# Patient Record
Sex: MALE | Race: WHITE | Employment: FULL TIME | URBAN - METROPOLITAN AREA
[De-identification: names, ages, dates, MRNs, and addresses within clinical notes are randomized per-mention and may not be internally consistent; named-entity substitution may affect disease eponyms.]

---

## 2024-01-18 DIAGNOSIS — L97.509 TOE ULCER (MULTI): ICD-10-CM

## 2024-01-18 DIAGNOSIS — I70.223 CRITICAL LIMB ISCHEMIA OF BOTH LOWER EXTREMITIES (MULTI): Primary | ICD-10-CM

## 2024-01-18 NOTE — PROGRESS NOTES
Referred by Dr. Verduzco ref. provider found provider found for No chief complaint on file.       History of Present Illness  Tim Stokes is a 53 y.o. year old male  with a history of type 2 diabetes mellitus, hypertension, hyperlipidemia, coronary artery disease status post CABGx3 3/22, history of congestive heart failure, morbid obesity, chronic venous stasis dermatitis, and long-standing and ongoing tobacco use, who has peripheral arterial occlusive disease and has non-healing wounds in both lower legs for 1.5 yrs.  He previously has undergone a left common iliac and  bilateral external iliac artery stenting as well as left SFA and popliteal angioplasties in 2017.  He underwent some toe amputations at that time in his left foot.  He now presents with tissue loss in both lower legs.  He has ulcerations involving his right medial lower leg, left medial foot and left anterolateral lower leg.  Noninvasive studies on 02/13/23, demonstrated right EDIN 0.69 and left EDIN 0.55. no toe pressure recorded.    He is now scheduled for Left femoral endarterectomy and Left fem-pop bypass surgery 1/26/24 at [facility] and they want to get other options before the surgery.  Aortogram with bilateral lower extremity runoff. 1/12/24 [facility]  IMPRESSION:    1.         65% right renal artery stenosis.  2.         Apparent left renal artery stenosis.  3.         70% mid infrarenal aortic stenosis.  4.         Patent right common iliac artery and bilateral external iliac artery stents.    5.         Occluded right internal iliac artery.  6.         Severe stenosis at the origin of the left internal iliac artery.  7.         Moderate left common femoral artery plaque disease.  8.         Occluded left superficial femoral artery.  9.         Reconstitution of a diseased left above-knee popliteal artery.  10.       Mild disease involving the left below-knee popliteal artery and 1 vessel peroneal runoff to the left foot.    11.       Left peroneal  collaterals distally reconstituted the posterior tibial and dorsalis pedis arteries in the left foot.    12.       Severe right common femoral artery plaque disease.  13.       Right superficial femoral artery occlusion.  14.       Right deep femoral collaterals reconstituted diseased right above-knee popliteal artery.    15.       Mild right below-knee popliteal artery disease and 1 vessel peroneal runoff to the right foot.    16.       Moderate right tibioperoneal trunk stenosis.  17.       Right peroneal artery reconstitutes the posterior tibial and dorsalis pedis arteries in the right foot.    Social History  Social History     Tobacco Use    Smoking status: Not on file    Smokeless tobacco: Not on file   Substance Use Topics    Alcohol use: Not on file    Drug use: Not on file       Family History   No family history on file.    Review of Systems  As per HPI, all other systems reviewed and negative.    Allergies:  Not on File     Outpatient Medications:  No current outpatient medications      Vitals:  There were no vitals filed for this visit.    Physical Exam:  Physical Exam      Reviewed Study(s):    Cardiac Studies  Echo: No results found for this or any previous visit.  Angiogram:     Vascular Studies   No image results found.       Assessment/Plan     3 y.o. year old male  with a history of type 2 diabetes mellitus, hypertension, hyperlipidemia, coronary artery disease status post CABGx3 3/22, history of congestive heart failure, morbid obesity, chronic venous stasis dermatitis, and long-standing and ongoing tobacco use, who has peripheral arterial occlusive disease and has non-healing wounds in both lower legs for 1.5 yrs.  He previously has undergone a left common iliac and  bilateral external iliac artery stenting as well as left SFA and popliteal angioplasties in 2017.  He underwent some toe amputations at that time in his left foot.  He now presents with tissue loss in both lower legs.  He has  ulcerations involving his right medial lower leg, left medial foot and left anterolateral lower leg.  Noninvasive studies on 02/13/23, demonstrated right EDIN 0.69 and left EDIN 0.55. no toe pressure recorded.    He is now scheduled for Left femoral endarterectomy and Left fem-pop bypass surgery 1/26/24 at [facility] and they want to get other options before the surgery.  Aortogram with bilateral lower extremity runoff. 1/12/24 [facility]    # Multilevel disease (as mentioned above, multiple intervention).  # Multiple vascular bed disease (artrial and venous).  # Extensive ulcers in  both legs.    - Patient was smoking and extensive smoking cessation counseling was given.  - Patient might need multiple intervention/surgery starting from Most proximal disease (aorta), followed by distal disease.  - Patient was given realistic expectation of really complex and prolonged course and we might still not able avoid some level of amputation.  - we will discuss with Surgeon and come up with plan and discuss with family.  - Plan discussed with Dr. Mely Lundberg MD MPH  PGY 8

## 2024-01-22 ENCOUNTER — OFFICE VISIT (OUTPATIENT)
Dept: CARDIOLOGY | Facility: CLINIC | Age: 54
End: 2024-01-22
Payer: COMMERCIAL

## 2024-01-22 VITALS
WEIGHT: 170 LBS | DIASTOLIC BLOOD PRESSURE: 90 MMHG | HEIGHT: 71 IN | BODY MASS INDEX: 23.8 KG/M2 | SYSTOLIC BLOOD PRESSURE: 126 MMHG | HEART RATE: 73 BPM

## 2024-01-22 DIAGNOSIS — I73.9 PAD (PERIPHERAL ARTERY DISEASE) (CMS-HCC): Primary | ICD-10-CM

## 2024-01-22 PROCEDURE — NOSHO NO SHOW VISIT: Performed by: INTERNAL MEDICINE

## 2024-01-22 ASSESSMENT — PAIN SCALES - GENERAL: PAINLEVEL: 2

## 2024-01-22 NOTE — PATIENT INSTRUCTIONS
52 YO M with DM2, HTN, CAD s/p CABG, PAD mutliple intervention presenting with bilateral foot and leg ulcer (mixed in nature) presenting to Dr. Boone for second opinion for leg intervention (multi level disease aortic, iliac, femoral and tibial).    # Multilevel PAD with CLTI  # Severe descending aorta stenosis  # Venous disease    - stop smoking  - continue taking aspirin and high intensity statin (atorvastatin 80 mg)  - plan to discuss the case as  multidisplinary approach.  - multiple staged procedure  - referral to Dr. Powell (podiatry) for aggressive wound care  - Plan was discussed extensively to patient and family by Dr. Boone  - will call patient within a week with final decisions

## 2024-01-23 ENCOUNTER — APPOINTMENT (OUTPATIENT)
Dept: VASCULAR MEDICINE | Facility: HOSPITAL | Age: 54
End: 2024-01-23

## 2024-01-23 ENCOUNTER — APPOINTMENT (OUTPATIENT)
Dept: CARDIOLOGY | Facility: CLINIC | Age: 54
End: 2024-01-23
Payer: COMMERCIAL

## 2024-01-23 ENCOUNTER — APPOINTMENT (OUTPATIENT)
Dept: CARDIOLOGY | Facility: HOSPITAL | Age: 54
End: 2024-01-23

## 2024-01-29 DIAGNOSIS — I73.9 PAD (PERIPHERAL ARTERY DISEASE) (CMS-HCC): Primary | ICD-10-CM

## 2024-01-30 ENCOUNTER — OFFICE VISIT (OUTPATIENT)
Dept: WOUND CARE | Facility: CLINIC | Age: 54
End: 2024-01-30
Payer: COMMERCIAL

## 2024-01-30 ENCOUNTER — APPOINTMENT (OUTPATIENT)
Dept: LAB | Facility: LAB | Age: 54
End: 2024-01-30
Payer: COMMERCIAL

## 2024-01-30 DIAGNOSIS — L97.522 NON-PRESSURE CHRONIC ULCER OF OTHER PART OF LEFT FOOT WITH FAT LAYER EXPOSED (MULTI): Primary | ICD-10-CM

## 2024-01-30 PROCEDURE — 87205 SMEAR GRAM STAIN: CPT

## 2024-01-30 PROCEDURE — 87070 CULTURE OTHR SPECIMN AEROBIC: CPT

## 2024-01-30 PROCEDURE — 99215 OFFICE O/P EST HI 40 MIN: CPT

## 2024-01-30 PROCEDURE — 99417 PROLNG OP E/M EACH 15 MIN: CPT

## 2024-01-30 PROCEDURE — 87075 CULTR BACTERIA EXCEPT BLOOD: CPT

## 2024-01-31 ENCOUNTER — HOSPITAL ENCOUNTER (INPATIENT)
Facility: HOSPITAL | Age: 54
LOS: 3 days | Discharge: HOME | DRG: 300 | End: 2024-02-04
Attending: EMERGENCY MEDICINE | Admitting: INTERNAL MEDICINE
Payer: COMMERCIAL

## 2024-01-31 ENCOUNTER — APPOINTMENT (OUTPATIENT)
Dept: RADIOLOGY | Facility: HOSPITAL | Age: 54
DRG: 300 | End: 2024-01-31
Payer: COMMERCIAL

## 2024-01-31 DIAGNOSIS — I73.9 PERIPHERAL ARTERIAL DISEASE (CMS-HCC): ICD-10-CM

## 2024-01-31 DIAGNOSIS — I87.2 CHRONIC VENOUS INSUFFICIENCY: ICD-10-CM

## 2024-01-31 DIAGNOSIS — L08.9 WOUND INFECTION: Primary | ICD-10-CM

## 2024-01-31 DIAGNOSIS — T14.8XXA WOUND INFECTION: Primary | ICD-10-CM

## 2024-01-31 LAB
ALBUMIN SERPL BCP-MCNC: 3.6 G/DL (ref 3.4–5)
ALP SERPL-CCNC: 77 U/L (ref 33–120)
ALT SERPL W P-5'-P-CCNC: 6 U/L (ref 10–52)
ANION GAP SERPL CALC-SCNC: 19 MMOL/L (ref 10–20)
AST SERPL W P-5'-P-CCNC: 10 U/L (ref 9–39)
BASOPHILS # BLD AUTO: 0.05 X10*3/UL (ref 0–0.1)
BASOPHILS # BLD AUTO: 0.06 X10*3/UL (ref 0–0.1)
BASOPHILS NFR BLD AUTO: 0.6 %
BASOPHILS NFR BLD AUTO: 0.7 %
BILIRUB SERPL-MCNC: 0.3 MG/DL (ref 0–1.2)
BUN SERPL-MCNC: 16 MG/DL (ref 6–23)
CALCIUM SERPL-MCNC: 9.4 MG/DL (ref 8.6–10.6)
CHLORIDE SERPL-SCNC: 104 MMOL/L (ref 98–107)
CO2 SERPL-SCNC: 17 MMOL/L (ref 21–32)
CREAT SERPL-MCNC: 0.66 MG/DL (ref 0.5–1.3)
EGFRCR SERPLBLD CKD-EPI 2021: >90 ML/MIN/1.73M*2
EOSINOPHIL # BLD AUTO: 0.2 X10*3/UL (ref 0–0.7)
EOSINOPHIL # BLD AUTO: 0.23 X10*3/UL (ref 0–0.7)
EOSINOPHIL NFR BLD AUTO: 2.4 %
EOSINOPHIL NFR BLD AUTO: 2.7 %
ERYTHROCYTE [DISTWIDTH] IN BLOOD BY AUTOMATED COUNT: 14.4 % (ref 11.5–14.5)
ERYTHROCYTE [DISTWIDTH] IN BLOOD BY AUTOMATED COUNT: 16.8 % (ref 11.5–14.5)
GLUCOSE BLD MANUAL STRIP-MCNC: 156 MG/DL (ref 74–99)
GLUCOSE SERPL-MCNC: 198 MG/DL (ref 74–99)
HCT VFR BLD AUTO: 40.7 % (ref 41–52)
HCT VFR BLD AUTO: 40.8 % (ref 41–52)
HGB BLD-MCNC: 13.7 G/DL (ref 13.5–17.5)
HGB BLD-MCNC: 14.2 G/DL (ref 13.5–17.5)
IMM GRANULOCYTES # BLD AUTO: 0.03 X10*3/UL (ref 0–0.7)
IMM GRANULOCYTES # BLD AUTO: 0.03 X10*3/UL (ref 0–0.7)
IMM GRANULOCYTES NFR BLD AUTO: 0.3 % (ref 0–0.9)
IMM GRANULOCYTES NFR BLD AUTO: 0.4 % (ref 0–0.9)
LYMPHOCYTES # BLD AUTO: 1.63 X10*3/UL (ref 1.2–4.8)
LYMPHOCYTES # BLD AUTO: 1.95 X10*3/UL (ref 1.2–4.8)
LYMPHOCYTES NFR BLD AUTO: 19.3 %
LYMPHOCYTES NFR BLD AUTO: 22.6 %
MCH RBC QN AUTO: 28.5 PG (ref 26–34)
MCH RBC QN AUTO: 29.2 PG (ref 26–34)
MCHC RBC AUTO-ENTMCNC: 33.6 G/DL (ref 32–36)
MCHC RBC AUTO-ENTMCNC: 34.9 G/DL (ref 32–36)
MCV RBC AUTO: 84 FL (ref 80–100)
MCV RBC AUTO: 85 FL (ref 80–100)
MONOCYTES # BLD AUTO: 0.75 X10*3/UL (ref 0.1–1)
MONOCYTES # BLD AUTO: 0.83 X10*3/UL (ref 0.1–1)
MONOCYTES NFR BLD AUTO: 8.9 %
MONOCYTES NFR BLD AUTO: 9.6 %
NEUTROPHILS # BLD AUTO: 5.52 X10*3/UL (ref 1.2–7.7)
NEUTROPHILS # BLD AUTO: 5.77 X10*3/UL (ref 1.2–7.7)
NEUTROPHILS NFR BLD AUTO: 64.1 %
NEUTROPHILS NFR BLD AUTO: 68.4 %
NRBC BLD-RTO: 0 /100 WBCS (ref 0–0)
NRBC BLD-RTO: 0 /100 WBCS (ref 0–0)
PLATELET # BLD AUTO: 213 X10*3/UL (ref 150–450)
PLATELET # BLD AUTO: 277 X10*3/UL (ref 150–450)
POTASSIUM SERPL-SCNC: 4.3 MMOL/L (ref 3.5–5.3)
PROT SERPL-MCNC: 7.2 G/DL (ref 6.4–8.2)
RBC # BLD AUTO: 4.8 X10*6/UL (ref 4.5–5.9)
RBC # BLD AUTO: 4.86 X10*6/UL (ref 4.5–5.9)
SODIUM SERPL-SCNC: 136 MMOL/L (ref 136–145)
WBC # BLD AUTO: 8.4 X10*3/UL (ref 4.4–11.3)
WBC # BLD AUTO: 8.6 X10*3/UL (ref 4.4–11.3)

## 2024-01-31 PROCEDURE — 85025 COMPLETE CBC W/AUTO DIFF WBC: CPT | Performed by: EMERGENCY MEDICINE

## 2024-01-31 PROCEDURE — 73590 X-RAY EXAM OF LOWER LEG: CPT | Mod: 50

## 2024-01-31 PROCEDURE — 96375 TX/PRO/DX INJ NEW DRUG ADDON: CPT

## 2024-01-31 PROCEDURE — 99285 EMERGENCY DEPT VISIT HI MDM: CPT | Performed by: EMERGENCY MEDICINE

## 2024-01-31 PROCEDURE — 96365 THER/PROPH/DIAG IV INF INIT: CPT

## 2024-01-31 PROCEDURE — 87040 BLOOD CULTURE FOR BACTERIA: CPT

## 2024-01-31 PROCEDURE — 2500000004 HC RX 250 GENERAL PHARMACY W/ HCPCS (ALT 636 FOR OP/ED)

## 2024-01-31 PROCEDURE — 73610 X-RAY EXAM OF ANKLE: CPT | Mod: LT

## 2024-01-31 PROCEDURE — 73610 X-RAY EXAM OF ANKLE: CPT | Mod: RT

## 2024-01-31 PROCEDURE — 80053 COMPREHEN METABOLIC PANEL: CPT | Performed by: EMERGENCY MEDICINE

## 2024-01-31 PROCEDURE — 73590 X-RAY EXAM OF LOWER LEG: CPT | Mod: BILATERAL PROCEDURE | Performed by: STUDENT IN AN ORGANIZED HEALTH CARE EDUCATION/TRAINING PROGRAM

## 2024-01-31 PROCEDURE — 85025 COMPLETE CBC W/AUTO DIFF WBC: CPT

## 2024-01-31 PROCEDURE — 36415 COLL VENOUS BLD VENIPUNCTURE: CPT | Performed by: EMERGENCY MEDICINE

## 2024-01-31 PROCEDURE — 36415 COLL VENOUS BLD VENIPUNCTURE: CPT

## 2024-01-31 PROCEDURE — 73610 X-RAY EXAM OF ANKLE: CPT | Mod: RIGHT SIDE | Performed by: STUDENT IN AN ORGANIZED HEALTH CARE EDUCATION/TRAINING PROGRAM

## 2024-01-31 PROCEDURE — 99223 1ST HOSP IP/OBS HIGH 75: CPT | Performed by: STUDENT IN AN ORGANIZED HEALTH CARE EDUCATION/TRAINING PROGRAM

## 2024-01-31 PROCEDURE — 82947 ASSAY GLUCOSE BLOOD QUANT: CPT

## 2024-01-31 RX ORDER — VANCOMYCIN HYDROCHLORIDE 1 G/200ML
2 INJECTION, SOLUTION INTRAVENOUS ONCE
Status: COMPLETED | OUTPATIENT
Start: 2024-01-31 | End: 2024-02-01

## 2024-01-31 RX ADMIN — VANCOMYCIN HYDROCHLORIDE 2 G: 1 INJECTION, SOLUTION INTRAVENOUS at 22:24

## 2024-01-31 RX ADMIN — PIPERACILLIN SODIUM AND TAZOBACTAM SODIUM 4.5 G: 4; .5 INJECTION, SOLUTION INTRAVENOUS at 21:32

## 2024-01-31 ASSESSMENT — PAIN - FUNCTIONAL ASSESSMENT: PAIN_FUNCTIONAL_ASSESSMENT: 0-10

## 2024-01-31 ASSESSMENT — COLUMBIA-SUICIDE SEVERITY RATING SCALE - C-SSRS
6. HAVE YOU EVER DONE ANYTHING, STARTED TO DO ANYTHING, OR PREPARED TO DO ANYTHING TO END YOUR LIFE?: NO
1. IN THE PAST MONTH, HAVE YOU WISHED YOU WERE DEAD OR WISHED YOU COULD GO TO SLEEP AND NOT WAKE UP?: NO
2. HAVE YOU ACTUALLY HAD ANY THOUGHTS OF KILLING YOURSELF?: NO

## 2024-01-31 ASSESSMENT — LIFESTYLE VARIABLES
HAVE PEOPLE ANNOYED YOU BY CRITICIZING YOUR DRINKING: NO
EVER HAD A DRINK FIRST THING IN THE MORNING TO STEADY YOUR NERVES TO GET RID OF A HANGOVER: NO
EVER FELT BAD OR GUILTY ABOUT YOUR DRINKING: NO
HAVE YOU EVER FELT YOU SHOULD CUT DOWN ON YOUR DRINKING: NO

## 2024-01-31 ASSESSMENT — PAIN SCALES - GENERAL: PAINLEVEL_OUTOF10: 5 - MODERATE PAIN

## 2024-01-31 ASSESSMENT — PAIN DESCRIPTION - LOCATION: LOCATION: LEG

## 2024-02-01 PROBLEM — T14.8XXA WOUND INFECTION: Status: ACTIVE | Noted: 2024-02-01

## 2024-02-01 PROBLEM — L08.9 WOUND INFECTION: Status: ACTIVE | Noted: 2024-02-01

## 2024-02-01 LAB
ALBUMIN SERPL BCP-MCNC: 3.3 G/DL (ref 3.4–5)
ANION GAP SERPL CALC-SCNC: 13 MMOL/L (ref 10–20)
BACTERIA SPEC CULT: ABNORMAL
BASOPHILS # BLD AUTO: 0.05 X10*3/UL (ref 0–0.1)
BASOPHILS NFR BLD AUTO: 0.7 %
BUN SERPL-MCNC: 14 MG/DL (ref 6–23)
CALCIUM SERPL-MCNC: 9.1 MG/DL (ref 8.6–10.6)
CHLORIDE SERPL-SCNC: 106 MMOL/L (ref 98–107)
CO2 SERPL-SCNC: 22 MMOL/L (ref 21–32)
CREAT SERPL-MCNC: 0.68 MG/DL (ref 0.5–1.3)
EGFRCR SERPLBLD CKD-EPI 2021: >90 ML/MIN/1.73M*2
EOSINOPHIL # BLD AUTO: 0.25 X10*3/UL (ref 0–0.7)
EOSINOPHIL NFR BLD AUTO: 3.4 %
ERYTHROCYTE [DISTWIDTH] IN BLOOD BY AUTOMATED COUNT: 14.2 % (ref 11.5–14.5)
GLUCOSE BLD MANUAL STRIP-MCNC: 156 MG/DL (ref 74–99)
GLUCOSE BLD MANUAL STRIP-MCNC: 175 MG/DL (ref 74–99)
GLUCOSE BLD MANUAL STRIP-MCNC: 197 MG/DL (ref 74–99)
GLUCOSE BLD MANUAL STRIP-MCNC: 204 MG/DL (ref 74–99)
GLUCOSE SERPL-MCNC: 158 MG/DL (ref 74–99)
GRAM STN SPEC: ABNORMAL
GRAM STN SPEC: ABNORMAL
HCT VFR BLD AUTO: 40 % (ref 41–52)
HGB BLD-MCNC: 13.3 G/DL (ref 13.5–17.5)
IMM GRANULOCYTES # BLD AUTO: 0.03 X10*3/UL (ref 0–0.7)
IMM GRANULOCYTES NFR BLD AUTO: 0.4 % (ref 0–0.9)
LYMPHOCYTES # BLD AUTO: 0.59 X10*3/UL (ref 1.2–4.8)
LYMPHOCYTES NFR BLD AUTO: 8.1 %
MAGNESIUM SERPL-MCNC: 1.87 MG/DL (ref 1.6–2.4)
MCH RBC QN AUTO: 27.9 PG (ref 26–34)
MCHC RBC AUTO-ENTMCNC: 33.3 G/DL (ref 32–36)
MCV RBC AUTO: 84 FL (ref 80–100)
MONOCYTES # BLD AUTO: 0.63 X10*3/UL (ref 0.1–1)
MONOCYTES NFR BLD AUTO: 8.6 %
NEUTROPHILS # BLD AUTO: 5.76 X10*3/UL (ref 1.2–7.7)
NEUTROPHILS NFR BLD AUTO: 78.8 %
NRBC BLD-RTO: 0 /100 WBCS (ref 0–0)
PHOSPHATE SERPL-MCNC: 3.4 MG/DL (ref 2.5–4.9)
PLATELET # BLD AUTO: 271 X10*3/UL (ref 150–450)
POTASSIUM SERPL-SCNC: 4.1 MMOL/L (ref 3.5–5.3)
RBC # BLD AUTO: 4.76 X10*6/UL (ref 4.5–5.9)
SODIUM SERPL-SCNC: 137 MMOL/L (ref 136–145)
WBC # BLD AUTO: 7.3 X10*3/UL (ref 4.4–11.3)

## 2024-02-01 PROCEDURE — 82947 ASSAY GLUCOSE BLOOD QUANT: CPT

## 2024-02-01 PROCEDURE — 85025 COMPLETE CBC W/AUTO DIFF WBC: CPT | Performed by: STUDENT IN AN ORGANIZED HEALTH CARE EDUCATION/TRAINING PROGRAM

## 2024-02-01 PROCEDURE — 36415 COLL VENOUS BLD VENIPUNCTURE: CPT | Performed by: INTERNAL MEDICINE

## 2024-02-01 PROCEDURE — 80069 RENAL FUNCTION PANEL: CPT | Performed by: STUDENT IN AN ORGANIZED HEALTH CARE EDUCATION/TRAINING PROGRAM

## 2024-02-01 PROCEDURE — 1100000001 HC PRIVATE ROOM DAILY

## 2024-02-01 PROCEDURE — 2500000004 HC RX 250 GENERAL PHARMACY W/ HCPCS (ALT 636 FOR OP/ED): Performed by: STUDENT IN AN ORGANIZED HEALTH CARE EDUCATION/TRAINING PROGRAM

## 2024-02-01 PROCEDURE — 83735 ASSAY OF MAGNESIUM: CPT | Performed by: INTERNAL MEDICINE

## 2024-02-01 PROCEDURE — 2500000002 HC RX 250 W HCPCS SELF ADMINISTERED DRUGS (ALT 637 FOR MEDICARE OP, ALT 636 FOR OP/ED): Performed by: STUDENT IN AN ORGANIZED HEALTH CARE EDUCATION/TRAINING PROGRAM

## 2024-02-01 PROCEDURE — 99233 SBSQ HOSP IP/OBS HIGH 50: CPT | Performed by: STUDENT IN AN ORGANIZED HEALTH CARE EDUCATION/TRAINING PROGRAM

## 2024-02-01 PROCEDURE — 2500000001 HC RX 250 WO HCPCS SELF ADMINISTERED DRUGS (ALT 637 FOR MEDICARE OP): Performed by: STUDENT IN AN ORGANIZED HEALTH CARE EDUCATION/TRAINING PROGRAM

## 2024-02-01 RX ORDER — ASPIRIN 81 MG/1
81 TABLET ORAL DAILY
Status: DISCONTINUED | OUTPATIENT
Start: 2024-02-01 | End: 2024-02-04 | Stop reason: HOSPADM

## 2024-02-01 RX ORDER — METFORMIN HYDROCHLORIDE 500 MG/1
500 TABLET ORAL
COMMUNITY
End: 2024-05-02 | Stop reason: ALTCHOICE

## 2024-02-01 RX ORDER — ACETAMINOPHEN 650 MG/1
650 SUPPOSITORY RECTAL EVERY 4 HOURS PRN
Status: DISCONTINUED | OUTPATIENT
Start: 2024-02-01 | End: 2024-02-04 | Stop reason: HOSPADM

## 2024-02-01 RX ORDER — ACETAMINOPHEN 160 MG/5ML
650 SOLUTION ORAL EVERY 4 HOURS PRN
Status: DISCONTINUED | OUTPATIENT
Start: 2024-02-01 | End: 2024-02-04 | Stop reason: HOSPADM

## 2024-02-01 RX ORDER — GLIPIZIDE 5 MG/1
5 TABLET ORAL 2 TIMES DAILY
COMMUNITY

## 2024-02-01 RX ORDER — DEXTROSE MONOHYDRATE 100 MG/ML
0.3 INJECTION, SOLUTION INTRAVENOUS ONCE AS NEEDED
Status: DISCONTINUED | OUTPATIENT
Start: 2024-02-01 | End: 2024-02-04 | Stop reason: HOSPADM

## 2024-02-01 RX ORDER — ASPIRIN 81 MG/1
81 TABLET ORAL DAILY
COMMUNITY

## 2024-02-01 RX ORDER — ATORVASTATIN CALCIUM 40 MG/1
40 TABLET, FILM COATED ORAL DAILY
COMMUNITY

## 2024-02-01 RX ORDER — INSULIN LISPRO 100 [IU]/ML
0-5 INJECTION, SOLUTION INTRAVENOUS; SUBCUTANEOUS
Status: DISCONTINUED | OUTPATIENT
Start: 2024-02-01 | End: 2024-02-04 | Stop reason: HOSPADM

## 2024-02-01 RX ORDER — POLYETHYLENE GLYCOL 3350 17 G/17G
17 POWDER, FOR SOLUTION ORAL DAILY
Status: DISCONTINUED | OUTPATIENT
Start: 2024-02-01 | End: 2024-02-03

## 2024-02-01 RX ORDER — OXYCODONE HYDROCHLORIDE 5 MG/1
5 TABLET ORAL EVERY 6 HOURS PRN
Status: DISCONTINUED | OUTPATIENT
Start: 2024-02-01 | End: 2024-02-04 | Stop reason: HOSPADM

## 2024-02-01 RX ORDER — ACETAMINOPHEN 325 MG/1
650 TABLET ORAL EVERY 4 HOURS PRN
Status: DISCONTINUED | OUTPATIENT
Start: 2024-02-01 | End: 2024-02-04 | Stop reason: HOSPADM

## 2024-02-01 RX ORDER — IBUPROFEN 200 MG
600 TABLET ORAL EVERY 6 HOURS PRN
COMMUNITY
End: 2024-02-04 | Stop reason: HOSPADM

## 2024-02-01 RX ORDER — TALC
6 POWDER (GRAM) TOPICAL NIGHTLY
Status: DISCONTINUED | OUTPATIENT
Start: 2024-02-01 | End: 2024-02-04 | Stop reason: HOSPADM

## 2024-02-01 RX ORDER — LOSARTAN POTASSIUM 50 MG/1
50 TABLET ORAL DAILY
COMMUNITY

## 2024-02-01 RX ORDER — LOSARTAN POTASSIUM 50 MG/1
50 TABLET ORAL DAILY
Status: DISCONTINUED | OUTPATIENT
Start: 2024-02-01 | End: 2024-02-04 | Stop reason: HOSPADM

## 2024-02-01 RX ORDER — ATORVASTATIN CALCIUM 40 MG/1
40 TABLET, FILM COATED ORAL DAILY
Status: DISCONTINUED | OUTPATIENT
Start: 2024-02-01 | End: 2024-02-04 | Stop reason: HOSPADM

## 2024-02-01 RX ORDER — DEXTROSE 50 % IN WATER (D50W) INTRAVENOUS SYRINGE
25
Status: DISCONTINUED | OUTPATIENT
Start: 2024-02-01 | End: 2024-02-04 | Stop reason: HOSPADM

## 2024-02-01 RX ORDER — ENOXAPARIN SODIUM 100 MG/ML
40 INJECTION SUBCUTANEOUS EVERY 12 HOURS SCHEDULED
Status: DISCONTINUED | OUTPATIENT
Start: 2024-02-01 | End: 2024-02-04 | Stop reason: HOSPADM

## 2024-02-01 RX ADMIN — ATORVASTATIN CALCIUM 40 MG: 40 TABLET, FILM COATED ORAL at 08:44

## 2024-02-01 RX ADMIN — DEXTROSE MONOHYDRATE 1500 MG: 50 INJECTION, SOLUTION INTRAVENOUS at 10:27

## 2024-02-01 RX ADMIN — ACETAMINOPHEN 650 MG: 325 TABLET ORAL at 21:56

## 2024-02-01 RX ADMIN — EMPAGLIFLOZIN 25 MG: 25 TABLET, FILM COATED ORAL at 08:45

## 2024-02-01 RX ADMIN — MELATONIN 6 MG: 3 TAB ORAL at 21:50

## 2024-02-01 RX ADMIN — OXYCODONE HYDROCHLORIDE 5 MG: 5 TABLET ORAL at 17:19

## 2024-02-01 RX ADMIN — PIPERACILLIN SODIUM AND TAZOBACTAM SODIUM 3.38 G: 3; .375 INJECTION, SOLUTION INTRAVENOUS at 03:17

## 2024-02-01 RX ADMIN — PIPERACILLIN SODIUM AND TAZOBACTAM SODIUM 3.38 G: 3; .375 INJECTION, SOLUTION INTRAVENOUS at 12:22

## 2024-02-01 RX ADMIN — PIPERACILLIN SODIUM AND TAZOBACTAM SODIUM 3.38 G: 3; .375 INJECTION, SOLUTION INTRAVENOUS at 18:09

## 2024-02-01 RX ADMIN — LOSARTAN POTASSIUM 50 MG: 50 TABLET, FILM COATED ORAL at 08:45

## 2024-02-01 RX ADMIN — ENOXAPARIN SODIUM 40 MG: 100 INJECTION SUBCUTANEOUS at 21:50

## 2024-02-01 RX ADMIN — ASPIRIN 81 MG: 81 TABLET, COATED ORAL at 08:45

## 2024-02-01 RX ADMIN — PIPERACILLIN SODIUM AND TAZOBACTAM SODIUM 3.38 G: 3; .375 INJECTION, SOLUTION INTRAVENOUS at 08:47

## 2024-02-01 RX ADMIN — INSULIN LISPRO 2 UNITS: 100 INJECTION, SOLUTION INTRAVENOUS; SUBCUTANEOUS at 11:06

## 2024-02-01 RX ADMIN — ACETAMINOPHEN 650 MG: 325 TABLET ORAL at 15:26

## 2024-02-01 RX ADMIN — DEXTROSE MONOHYDRATE 1500 MG: 50 INJECTION, SOLUTION INTRAVENOUS at 21:51

## 2024-02-01 RX ADMIN — MELATONIN 6 MG: 3 TAB ORAL at 00:06

## 2024-02-01 SDOH — SOCIAL STABILITY: SOCIAL INSECURITY: DOES ANYONE TRY TO KEEP YOU FROM HAVING/CONTACTING OTHER FRIENDS OR DOING THINGS OUTSIDE YOUR HOME?: NO

## 2024-02-01 SDOH — SOCIAL STABILITY: SOCIAL INSECURITY: HAS ANYONE EVER THREATENED TO HURT YOUR FAMILY OR YOUR PETS?: NO

## 2024-02-01 SDOH — SOCIAL STABILITY: SOCIAL INSECURITY: ARE THERE ANY APPARENT SIGNS OF INJURIES/BEHAVIORS THAT COULD BE RELATED TO ABUSE/NEGLECT?: NO

## 2024-02-01 SDOH — SOCIAL STABILITY: SOCIAL INSECURITY: ARE YOU OR HAVE YOU BEEN THREATENED OR ABUSED PHYSICALLY, EMOTIONALLY, OR SEXUALLY BY ANYONE?: NO

## 2024-02-01 SDOH — SOCIAL STABILITY: SOCIAL INSECURITY: DO YOU FEEL ANYONE HAS EXPLOITED OR TAKEN ADVANTAGE OF YOU FINANCIALLY OR OF YOUR PERSONAL PROPERTY?: NO

## 2024-02-01 SDOH — SOCIAL STABILITY: SOCIAL INSECURITY: HAVE YOU HAD THOUGHTS OF HARMING ANYONE ELSE?: NO

## 2024-02-01 SDOH — SOCIAL STABILITY: SOCIAL INSECURITY: ABUSE: ADULT

## 2024-02-01 SDOH — SOCIAL STABILITY: SOCIAL INSECURITY: DO YOU FEEL UNSAFE GOING BACK TO THE PLACE WHERE YOU ARE LIVING?: NO

## 2024-02-01 ASSESSMENT — LIFESTYLE VARIABLES
HOW OFTEN DO YOU HAVE A DRINK CONTAINING ALCOHOL: NEVER
AUDIT-C TOTAL SCORE: 0
HOW OFTEN DO YOU HAVE 6 OR MORE DRINKS ON ONE OCCASION: NEVER
HOW MANY STANDARD DRINKS CONTAINING ALCOHOL DO YOU HAVE ON A TYPICAL DAY: PATIENT DOES NOT DRINK
SUBSTANCE_ABUSE_PAST_12_MONTHS: NO
AUDIT-C TOTAL SCORE: 0
PRESCIPTION_ABUSE_PAST_12_MONTHS: NO
SKIP TO QUESTIONS 9-10: 1

## 2024-02-01 ASSESSMENT — ENCOUNTER SYMPTOMS
CARDIOVASCULAR NEGATIVE: 1
RESPIRATORY NEGATIVE: 1
CHILLS: 0
COLOR CHANGE: 0
FEVER: 0
WOUND: 1
GASTROINTESTINAL NEGATIVE: 1
UNEXPECTED WEIGHT CHANGE: 0

## 2024-02-01 ASSESSMENT — ACTIVITIES OF DAILY LIVING (ADL)
ASSISTIVE_DEVICE: WALKER
FEEDING YOURSELF: INDEPENDENT
BATHING: INDEPENDENT
LACK_OF_TRANSPORTATION: NO
TOILETING: INDEPENDENT
HEARING - RIGHT EAR: FUNCTIONAL
HEARING - LEFT EAR: FUNCTIONAL
WALKS IN HOME: INDEPENDENT
ADEQUATE_TO_COMPLETE_ADL: YES
JUDGMENT_ADEQUATE_SAFELY_COMPLETE_DAILY_ACTIVITIES: YES
PATIENT'S MEMORY ADEQUATE TO SAFELY COMPLETE DAILY ACTIVITIES?: YES
DRESSING YOURSELF: INDEPENDENT
GROOMING: INDEPENDENT

## 2024-02-01 ASSESSMENT — PATIENT HEALTH QUESTIONNAIRE - PHQ9
1. LITTLE INTEREST OR PLEASURE IN DOING THINGS: NOT AT ALL
2. FEELING DOWN, DEPRESSED OR HOPELESS: NOT AT ALL
SUM OF ALL RESPONSES TO PHQ9 QUESTIONS 1 & 2: 0

## 2024-02-01 ASSESSMENT — COGNITIVE AND FUNCTIONAL STATUS - GENERAL
DAILY ACTIVITIY SCORE: 18
HELP NEEDED FOR BATHING: A LITTLE
WALKING IN HOSPITAL ROOM: A LITTLE
MOVING TO AND FROM BED TO CHAIR: A LITTLE
TURNING FROM BACK TO SIDE WHILE IN FLAT BAD: A LITTLE
CLIMB 3 TO 5 STEPS WITH RAILING: A LITTLE
TOILETING: A LITTLE
STANDING UP FROM CHAIR USING ARMS: A LITTLE
PERSONAL GROOMING: A LITTLE
DRESSING REGULAR LOWER BODY CLOTHING: A LITTLE
DRESSING REGULAR UPPER BODY CLOTHING: A LITTLE
EATING MEALS: A LITTLE
MOVING FROM LYING ON BACK TO SITTING ON SIDE OF FLAT BED WITH BEDRAILS: A LITTLE
PATIENT BASELINE BEDBOUND: NO
MOBILITY SCORE: 18

## 2024-02-01 ASSESSMENT — PAIN SCALES - GENERAL
PAINLEVEL_OUTOF10: 2
PAINLEVEL_OUTOF10: 5 - MODERATE PAIN
PAINLEVEL_OUTOF10: 8

## 2024-02-01 ASSESSMENT — PAIN - FUNCTIONAL ASSESSMENT
PAIN_FUNCTIONAL_ASSESSMENT: 0-10

## 2024-02-01 NOTE — CARE PLAN
The patient's goals for the shift include pt will remain safe -met     The clinical goals for the shift include pt will rate pain 4/10 or less -progressing

## 2024-02-01 NOTE — H&P (VIEW-ONLY)
History Of Present Illness  Tim Stokes is a 53 y.o. male with T2DM, HTN, HDL, CAD status post CABG X3, CHF, PAD, chronic venous stasis dermatitis s/p left common iliac, bilateral external iliac artery stenting, left SFA and popliteal angioplasties, left toe amputation presents with concern of poor circulation in bilateral lower extremities.     Pt has chronic venous stasis wounds located on b/l lower extremities for the past 1.5 years. He notes that for the particular last couple months, his wounds have had worsening odor, pustular drainage, and pain with poor healing.      Per home care notes, appears that patient has reported left lower leg, ankle, and foot wounds all declining in nature with additional new right lower leg wound.  Left medial ankle and left dorsal foot wounds started in March 2022. Left anterior leg started July 2023; right medial lower leg started December 2023. Mid chest wound emerged after surgery around 1 year ago.     Of note, patient was seen on 12/1/2023 to expedite vascular surgery. Vascular surgery evaluated 1/12, with inability to stent or apply angioplasty with possible thoughts of bypass surgery. Both Dr. Baez at [facility] and  vascular surgery did not recommend limb flow.     In the ED, VS T 36.4, RR 16, HR 73, /84, 98% on room air  Labs: /4.3/104/17/16/0.66, mg [ ]   Interventions: Blood cultures ordered, IV Vanc/Zosyn      Past Medical History  Surgical History  T2DM, HTN, HDL, CAD status post CABG X3, CHF, PAD, chronic venous stasis dermatitis s/p left common iliac, bilateral external iliac artery stenting, left SFA and popliteal angioplasties, left toe amputation      Social History  current smoker (not interested in smoking cessation)    Family History  No family history on file.     Allergies  Patient has no known allergies.    Review of Systems   Constitutional:  Negative for chills, fever and unexpected weight change.   HENT: Negative.     Respiratory: Negative.      Cardiovascular: Negative.  Negative for leg swelling.   Gastrointestinal: Negative.    Skin:  Positive for wound. Negative for color change and pallor.     Physical Exam  Constitutional:       Appearance: He is obese.   HENT:      Head: Normocephalic and atraumatic.      Mouth/Throat:      Mouth: Mucous membranes are moist.   Eyes:      Extraocular Movements: Extraocular movements intact.      Pupils: Pupils are equal, round, and reactive to light.   Cardiovascular:      Pulses: Normal pulses.      Heart sounds: No murmur heard.     Comments: 1+ PT pulses bilaterally  Pulmonary:      Effort: Pulmonary effort is normal.      Breath sounds: Normal breath sounds.   Abdominal:      General: Bowel sounds are normal.      Palpations: Abdomen is soft.   Musculoskeletal:      Comments: R leg appears bigger chronically since procedure; but not edematous.    Skin:     Comments: deep, pustular, erythematous skin around bilateral lower extremities   Neurological:      Mental Status: He is alert.      Comments: Full range of motion, normal intact sensation in BUE and BLE       Last Recorded Vitals  Blood pressure 125/75, pulse 83, temperature 36.7 °C (98.1 °F), temperature source Oral, resp. rate 18, weight 133 kg (293 lb), SpO2 97 %.    Relevant Results  Scheduled medications  aspirin, 81 mg, oral, Daily  atorvastatin, 40 mg, oral, Daily  empagliflozin, 25 mg, oral, Daily  enoxaparin, 40 mg, subcutaneous, q12h GAGE  insulin lispro, 0-5 Units, subcutaneous, TID with meals  losartan, 50 mg, oral, Daily  melatonin, 6 mg, oral, Nightly  piperacillin-tazobactam, 3.375 g, intravenous, q6h  polyethylene glycol, 17 g, oral, Daily  vancomycin, 1,500 mg, intravenous, q12h    PRN medications  PRN medications: acetaminophen **OR** acetaminophen **OR** acetaminophen, dextrose 10 % in water (D10W), dextrose, glucagon, oxyCODONE    XR tibia fibula bilateral 2 views: Result Date: 2/1/2024    There is generalized soft tissue edema of the right  left leg the right and left leg, as well as ulcerations of the distal right left leg and dorsal left midfoot and forefoot. Findings are related in part to venous stasis edema given the presence of phleboliths bilaterally. No evidence of osteomyelitis of the proximal tibia or fibula on the right or left. Aside from the ulcerations, no evidence of soft tissue gas to suggest necrotizing infection.    XR ankle left 3+ views: Result Date: 2/1/2024    LEFT ANKLE: 1. Soft tissue ulcerations of the anterolateral distal leg and dorsal midfoot/forefoot without radiographic evidence of acute osteomyelitis. 2. Chronic periosteal reaction of the distal tibia and fibula likely related to venous stasis given extensive phleboliths. 3. Status post 4th-5th transmetatarsal amputations and 3rd toe amputation at the MTP joint. 4. Multiple additional potential etiologies of ankle/foot pain as described in detail above, depending on location and clinical context. 5. No acute fracture or malalignment.       RIGHT ANKLE: 1. Large soft tissue ulceration of the medial distal right leg without radiographic evidence of acute osteomyelitis. 2. Diffuse chronic periosteal new bone formation of the distal tibia and fibula related to venous stasis edema given phleboliths. 3. Advanced Charcot arthropathy of the right midfoot with collapse of the talonavicular and calcaneocuboid joints, and chronic fragmentation, ankylosis, and prominent plantar osseous protuberance arising from the midfoot that can predispose to pressure-related ulceration and osteomyelitis with continued weight-bearing. 4. Multiple potential etiologies of ankle/foot pain as described in detail above, depending on location and clinical context.       Aortogram with bilateral lower extremity runoff ([facility] 1/12)  IMPRESSION:    1.         65% right renal artery stenosis.  2.         Apparent left renal artery stenosis.  3.         70% mid infrarenal aortic stenosis.  4.         Patent  right common iliac artery and bilateral external iliac artery stents.    5.         Occluded right internal iliac artery.  6.         Severe stenosis at the origin of the left internal iliac artery.  7.         Moderate left common femoral artery plaque disease.  8.         Occluded left superficial femoral artery.  9.         Reconstitution of a diseased left above-knee popliteal artery.  10.       Mild disease involving the left below-knee popliteal artery and 1 vessel peroneal runoff to the left foot.    11.       Left peroneal collaterals distally reconstituted the posterior tibial and dorsalis pedis arteries in the left foot.    12.       Severe right common femoral artery plaque disease.  13.       Right superficial femoral artery occlusion.  14.       Right deep femoral collaterals reconstituted diseased right above-knee popliteal artery.    15.       Mild right below-knee popliteal artery disease and 1 vessel peroneal runoff to the right foot.    16.       Moderate right tibioperoneal trunk stenosis.  17.       Right peroneal artery reconstitutes the posterior tibial and dorsalis pedis arteries in the right foot.    Assessment/Plan   53 year old male with a history of type 2 diabetes mellitus, hypertension, hyperlipidemia, coronary artery disease status post CABGx3 3/22, history of congestive heart failure, morbid obesity, chronic venous stasis dermatitis, and long-standing and ongoing tobacco use, who has peripheral arterial occlusive disease and has non-healing wounds in both lower legs for 1.5 yrs.  He previously has undergone a left common iliac and  bilateral external iliac artery stenting as well as left SFA and popliteal angioplasties in 2017, in addition to some toe amputations at that time in his left foot.  He now presents with tissue loss in both lower legs with worsening ulcerations involving his right medial lower leg, left medial foot and left anterolateral lower leg.  He is admitted for  concern for infection given extensive malodorous drainage; as well as long term planning with podiatry and vascular surgery.     #Bilateral lower extremity wounds, acute on chronic   [ ] wound culture 1/30 podiatry clinic: 2+ few polymorphonuclear leukocytes, moderate mixed gram positive and gram negative bacteria  [ ] blood culture x2 1/31  [ ] vascular surgery consult in AM (PT 1+ present bilaterally on exam)  [ ] podiatry consult for skin graft (patient previously refused)  -Continue vanc/zosyn (1/31-   [ ] Xrays ordered for evaluation of osteomyelitis/gas- if +, will add clindamycin for coverage  -provide resources to stop smoking  [ ] nutrition consult to help with wound healing    #T2DM  -holding metformin, glipizide, ozempic  -mild SSI     #HDL  #CAD status post CABG X3  -continue atorvastatin    #HTN  #CHF  -continue losartan   -continue jardiance    #PAD  #chronic venous stasis dermatitis s/p left common iliac, bilateral external iliac artery stenting, left SFA and popliteal angioplasties  #left toe amputation   -continue aspirin 81mg BID    F: None  E: Replete PRN  N: Carb controlled diet  GI ppx: None  DVT ppx: Lovenox BID (dosing for weight)    Code: FULL  NOK: Laura (sister in law)     To be fully staffed with attending in AM.     Ravi Corey MD

## 2024-02-01 NOTE — PROGRESS NOTES
"Vancomycin Dosing by Pharmacy- INITIAL    Tim Stokes is a 53 y.o. year old male who Pharmacy has been consulted for vancomycin dosing for cellulitis, skin and soft tissue. Based on the patient's indication and renal status this patient will be dosed based on a goal AUC of 400-600.     Renal function is currently stable.    Visit Vitals  /75   Pulse 83   Temp 36.7 °C (98.1 °F) (Oral)   Resp 18        Lab Results   Component Value Date    CREATININE 0.66 01/31/2024        Patient weight is No results found for: \"PTWEIGHT\"    No results found for: \"CULTURE\"     No intake/output data recorded.  [unfilled]    No results found for: \"PATIENTTEMP\"       Assessment/Plan     Patient has already been given a loading dose of 2000 mg.  Will initiate vancomycin maintenance,  1500 mg every 12 hours.    This dosing regimen is predicted by [employer] to result in the following pharmacokinetic parameters:    Loading dose: N/A  Regimen: 1500 mg IV every 12 hours.  Start time: 10:24 on 02/01/2024  Exposure target: AUC24 (range)400-600 mg/L.hr   AUC24,ss: 459 mg/L.hr  Probability of AUC24 > 400: 60 %  Ctrough,ss: 10.6 mg/L  Probability of Ctrough,ss > 20: 24 %  Probability of nephrotoxicity (Lodise NAHUM 2009): 6 %    Follow-up level will be ordered on 2/3 at AM labs, unless clinically indicated sooner.  Will continue to monitor renal function daily while on vancomycin and order serum creatinine at least every 48 hours if not already ordered.  Follow for continued vancomycin needs, clinical response, and signs/symptoms of toxicity.       ERIC CHAVIRA, PharmD       "

## 2024-02-01 NOTE — CONSULTS
Inpatient consult to [facility]  Consult performed by: ANURADHA Riley-CNP  Consult ordered by: Hemant Patel MD  Reason for consult: Foot wound and infection        History Of Present Illness:    Tim Stokes is a 53 y.o. male presented to the ED with bilateral lower extremity wound infection. Pt was seen by podiatrist Dr. Gm Garcia on 1/31/2024, who noticed Pt wound had foul odor and drainage concerned for infection so sent patient to the ED for evaluation and Abx treatment.   He has a history of type 2 diabetes mellitus, hypertension, hyperlipidemia, coronary artery disease status post CABGx3 3/22, history of congestive heart failure, morbid obesity (BMI 40), chronic venous stasis dermatitis, and long-standing and ongoing tobacco use, who has peripheral arterial occlusive disease and has non-healing wounds in both lower legs for 1.5 yrs. He previously has undergone a left common iliac and bilateral external iliac artery stenting as well as left SFA and popliteal angioplasties in 2017. He underwent some toe amputations at that time in his left foot. He now presents with tissue loss in both lower legs. He has ulcerations involving his right medial lower leg, left medial foot and left anterolateral lower leg.  Noninvasive studies at [facility] on 02/13/23, demonstrated right EDIN 0.69 and left EDIN 0.55. no toe pressure recorded. He was scheduled for Left femoral endarterectomy and Left fem-pop bypass surgery 1/26/24 at [facility] and they want to get second opinion before the surgery. Pt was seen by Dr Boone on 1/22/2024 and determined Pt was not a good candidate for endovascular intervention due to the extent of the aortoiliac disease. The plan was to discuss the case with vascular surgeon Dr. Godinez for better surgical option. Pt reports some foot/leg pain but due to neuropathy pain is not significant. Pt denied chest pain, Sob, fever, chills, nausea, vomiting or diarrhea.     Last Recorded  Vitals:  Vitals:    02/01/24 0636 02/01/24 0737 02/01/24 0959 02/01/24 1118   BP: 158/71 135/70  158/87   Pulse: 62 77  80   Resp: 18 16  18   Temp: 36.6 °C (97.9 °F)  35.8 °C (96.5 °F)    TempSrc:       SpO2: 97% 98%  99%   Weight:           Last Labs:  CBC - 2/1/2024:  6:28 AM  7.3 13.3 271    40.0      CMP - 2/1/2024:  6:28 AM  9.1 7.2 10 --- 0.3   3.4 3.3 6 77        Hemoglobin A1C   Date/Time Value Ref Range Status   07/06/2022 02:30 PM 6.2 (H) 4.3 - 5.6 % Final     Comment:     American Diabetes Association guidelines indicate that patients with HgbA1c in the range 5.7-6.4% are at increased risk for development of diabetes, and intervention by lifestyle modification may be beneficial. HgbA1c greater or equal to 6.5% is considered diagnostic of diabetes.   03/17/2022 02:34 PM 6.3 (H) 4.3 - 5.6 % Final     Comment:     American Diabetes Association guidelines indicate that patients with HgbA1c in the range 5.7-6.4% are at increased risk for development of diabetes, and intervention by lifestyle modification may be beneficial. HgbA1c greater or equal to 6.5% is considered diagnostic of diabetes.      Last I/O:  No intake/output data recorded.    Vascular Study from OSH     Aortogram with bilateral lower extremity runoff. 1/12/24 [facility] (Image in PACS)  IMPRESSION:    1.         65% right renal artery stenosis.  2.         Apparent left renal artery stenosis.  3.         70% mid infrarenal aortic stenosis.  4.         Patent right common iliac artery and bilateral external iliac artery stents.    5.         Occluded right internal iliac artery.  6.         Severe stenosis at the origin of the left internal iliac artery.  7.         Moderate left common femoral artery plaque disease.  8.         Occluded left superficial femoral artery.  9.         Reconstitution of a diseased left above-knee popliteal artery.  10.       Mild disease involving the left below-knee popliteal artery and 1 vessel peroneal runoff to the  left foot.    11.       Left peroneal collaterals distally reconstituted the posterior tibial and dorsalis pedis arteries in the left foot.    12.       Severe right common femoral artery plaque disease.  13.       Right superficial femoral artery occlusion.  14.       Right deep femoral collaterals reconstituted diseased right above-knee popliteal artery.    15.       Mild right below-knee popliteal artery disease and 1 vessel peroneal runoff to the right foot.    16.       Moderate right tibioperoneal trunk stenosis.  17.       Right peroneal artery reconstitutes the posterior tibial and dorsalis pedis arteries in the right foot.    Past Medical History:  As listed above    Past Surgical History:  As listed above      Social History:  Current every day smoker     Family History:  Extensive cardiac and vascular disease Father, brother     Allergies:  Patient has no known allergies.    Inpatient Medications:  Scheduled medications   Medication Dose Route Frequency    aspirin  81 mg oral Daily    atorvastatin  40 mg oral Daily    empagliflozin  25 mg oral Daily    enoxaparin  40 mg subcutaneous q12h GAGE    insulin lispro  0-5 Units subcutaneous TID with meals    losartan  50 mg oral Daily    melatonin  6 mg oral Nightly    piperacillin-tazobactam  3.375 g intravenous q6h    polyethylene glycol  17 g oral Daily    vancomycin  1,500 mg intravenous q12h     PRN medications   Medication    acetaminophen    Or    acetaminophen    Or    acetaminophen    dextrose 10 % in water (D10W)    dextrose    glucagon    oxyCODONE     Continuous Medications   Medication Dose Last Rate     Outpatient Medications:  Current Outpatient Medications   Medication Instructions    aspirin 81 mg, oral, Daily    atorvastatin (LIPITOR) 40 mg, oral, Daily    empagliflozin (JARDIANCE) 25 mg, oral, Daily    glipiZIDE XL (GLUCOTROL XL) 5 mg, oral, 2 times daily, Do not crush, chew, or split.    losartan (COZAAR) 50 mg, oral, Daily    metFORMIN (OSM)  (FORTAMET) 500 mg, oral, Daily with evening meal, Do not crush, chew, or split.    semaglutide (OZEMPIC) 1 mg, subcutaneous, Every 7 days       Physical Exam:  Constitutional: morbid obesity, NAD, cooperative     Head/Neck: Neck supple,  No JVD, trachea midline, no bruits           Respiratory/Thorax: Patent airways, CTAB, On RA    Cardiovascular: Regular, rate and rhythm, no murmurs, normal S 1 and S 2    Gastrointestinal: Nondistended, soft, non-tender, +BS,       Skin: Warm and dry              Extremities: venous stasis dermatitis, wounds covered DSD, unable to doppler pulses, upper legs are warm to touch  RLE wounds 1/31/24      LLE wounds 1/31/24      Neuro: awake/alert/oriented x3, non-focal, loss of find touch LE  Psychological: frustrated mood due to long stay in the ED waiting for bed and not knowing plan     Assessment/Plan   54 YO male with h/o HTN, DM, HLD, CAD s/p CABG, ongoing tobbaco use(willing to stop), venous stasis dermatitis, morbid obesity, HF, and multilevel occlusion (aortic stenosis, occluded internals, occluded femorals, occluded tibials) and bilateral previous intervention with extensive ulcers on both legs.  He previously has undergone a left common iliac and  bilateral external iliac artery stenting as well as left SFA and popliteal angioplasties in 2017. He underwent some toe amputations at that time in his left foot. He now presents with tissue loss in both lower legs. He has ulcerations involving his right medial lower leg, left medial foot and left anterolateral lower leg.       #B/L LE CLTI  RC-VI  #B/L LE ulceration      Plan/recommendations    - Patient will likely need multiple intervention/surgery starting from Most proximal disease (aorta), followed by distal disease.  - Patient was given realistic expectation of really complex and prolonged course and we might still not able avoid some level of amputation.   - Podiatry does not believe left foot is salvageable due to the  extent of the wounds.  - Vascular surgery consulted. They will assess pt and determine plan for surgical intervention. Dr. Boone to discuss plan with vascular surgeon Dr. Godinez  - No intervention from [facility] at this time.   - c/w Wound care per podiatry and Abx per primary/ID   - Primary team updated on the plan  - Case and plan discussed with Dr Boone      Thank you for the consult [facility] will continue to follow in the periphery for any assistance. Do not hesitate to contact us with any questions.             ANURADHA Riley-CNP  [facility]   Day: [id number]/[place]/Night [facility] [id number]

## 2024-02-01 NOTE — ED PROVIDER NOTES
HPI   Chief Complaint   Patient presents with    Wound Check       HPI    Patient is a 53-year-old male with significant medical history of T2DM, hypertension, hyperlipidemia, CAD status post CABG x 3, CHF, PAD, chronic venous stasis wounds in the presented for a wound check.  Patient has been complaining of 2 weeks of change in smell of the bilateral lower extremity chronic venous stasis wounds.  States that pustule discharge around the wounds are chronic and been there since a year and a half.  However patient states that in the past 5 days he has been having trouble walking even more so than usual.  Patient states that he is currently being assessed by vascular surgery and per partner there workup was seen yesterday by podiatrist.  Podiatrist took a culture of the wounds.  Podiatry clinic informed him he should come to the emergency room for wound evaluation.  Patient denies fever, cough, congestion, rhinorrhea, abdominal pain, shortness of breath, chest pain.  Patient believes that wounds are now infected.                  Ramya Coma Scale Score: 15                  Patient History   No past medical history on file.  No past surgical history on file.  No family history on file.  Social History     Tobacco Use    Smoking status: Not on file    Smokeless tobacco: Not on file   Substance Use Topics    Alcohol use: Not on file    Drug use: Not on file       Physical Exam   ED Triage Vitals   Temperature Heart Rate Respirations BP   01/31/24 1140 01/31/24 1140 01/31/24 1140 01/31/24 1140   36.4 °C (97.5 °F) 73 16 164/84      Pulse Ox Temp Source Heart Rate Source Patient Position   01/31/24 1140 01/31/24 1140 01/31/24 1402 01/31/24 1402   98 % Temporal Monitor Sitting      BP Location FiO2 (%)     01/31/24 1402 --     Left arm        Physical Exam  Constitutional:       Appearance: Normal appearance. He is obese.   HENT:      Head: Normocephalic and atraumatic.      Right Ear: External ear normal.      Left Ear:  External ear normal.      Nose: Nose normal.      Mouth/Throat:      Mouth: Mucous membranes are moist.      Pharynx: Oropharynx is clear.   Eyes:      Conjunctiva/sclera: Conjunctivae normal.   Cardiovascular:      Rate and Rhythm: Normal rate and regular rhythm.      Comments: 1+ PT b/l   Pulmonary:      Effort: Pulmonary effort is normal.      Breath sounds: Normal breath sounds.   Abdominal:      General: Abdomen is flat.      Palpations: Abdomen is soft.   Musculoskeletal:         General: Swelling present.      Cervical back: Normal range of motion and neck supple.   Skin:     General: Skin is warm.      Comments: Left medial tibial deep venous stasis wound with surrounding erythema, ranging in size of 5 cm x 3 cm, some pustule drainage seen, several dark necrotic spots surrounding wound  Left first digit some pustule drainage from the toe  Amputated the left foot 3rd-5th digit  Left lateral foot chronic venous stasis wound, with erythema surrounding and black necrotic spots seen  Right medial deep venous stasis wound   Neurological:      Mental Status: He is alert.         ED Course & MDM   Diagnoses as of 02/01/24 0108   Wound infection       Medical Decision Making  Patient is a 53-year-old male with significant medical history of T2DM, hypertension, hyperlipidemia, CAD status post CABG x 3, CHF, PAD, chronic venous stasis wounds in the presented for a wound check.  2 weeks of foul smell of the bilateral lower extremity chronic venous stasis wounds.  Pustule discharge around the wounds are chronic.  Trouble walking the past 5 days.  Site looks erythematous around the deep venous stasis wounds along with pustule discharge and new necrotic dark spots surrounding the wound.  Differential diagnosis includes cellulitis, chronic venous stasis wounds, necrotizing fasciitis, osteomyelitis, acute ischemic limb.  1+ PTs bilaterally were appreciated making this less likely an acute ischemic limb.  Some concern for  osteomyelitis, x-rays of the right and left ankle showed no osteomyelitis or gas within the fascial plane making this less likely necrotizing fasciitis. Patient was placed on Zosyn and vancomycin for coverage of cellulitis.  Patient will be admitted to medicine for continued management.  Likely patient will need consult for vascular surgery and wound care.     Procedure  Procedures     Negin Tirado MD  Resident  02/01/24 0118       Negin Tirado MD  Resident  02/01/24 0120    -------------------------------------------  This patient was seen by the resident physician.  I have seen and examined the patient, agree with the workup, evaluation, management and diagnosis. I reviewed and edited the above documentation where necessary.     Juwan Stoner MD   Attending Physician      Juwan Stoner MD MPH  02/04/24 7010

## 2024-02-01 NOTE — PROGRESS NOTES
Tim Stokes is a 53 y.o. male on day 0 of admission presenting with Wound infection.    Subjective   Patient doing okay this morning without new complaints. Is frustrated regarding the lack of clarity regarding his plan at his previous outpatient visits at [facility].       Objective     Physical Exam  Constitutional:       Appearance: He is obese.   HENT:      Head: Normocephalic and atraumatic.      Mouth/Throat:      Mouth: Mucous membranes are moist.   Eyes:      Extraocular Movements: Extraocular movements intact.      Pupils: Pupils are equal, round, and reactive to light.   Cardiovascular:      Pulses: Normal pulses.      Heart sounds: No murmur heard.     Comments: 1+ PT pulses bilaterally  Pulmonary:      Effort: Pulmonary effort is normal.      Breath sounds: Normal breath sounds.   Abdominal:      General: Bowel sounds are normal.      Palpations: Abdomen is soft.   Musculoskeletal:      Comments: R leg appears bigger chronically since procedure; but not edematous.    Skin:     Comments: deep, pustular, erythematous skin around bilateral lower extremities   Neurological:      Mental Status: He is alert.      Comments: Full range of motion, normal intact sensation in BUE and BLE     Last Recorded Vitals  Blood pressure 135/70, pulse 77, temperature 36.6 °C (97.9 °F), resp. rate 16, weight 133 kg (293 lb), SpO2 98 %.  Intake/Output last 3 Shifts:  No intake/output data recorded.    Relevant Results              Results for orders placed or performed during the hospital encounter of 01/31/24 (from the past 24 hour(s))   CBC with Differential   Result Value Ref Range    WBC 8.4 4.4 - 11.3 x10*3/uL    nRBC 0.0 0.0 - 0.0 /100 WBCs    RBC 4.86 4.50 - 5.90 x10*6/uL    Hemoglobin 14.2 13.5 - 17.5 g/dL    Hematocrit 40.7 (L) 41.0 - 52.0 %    MCV 84 80 - 100 fL    MCH 29.2 26.0 - 34.0 pg    MCHC 34.9 32.0 - 36.0 g/dL    RDW 14.4 11.5 - 14.5 %    Platelets 277 150 - 450 x10*3/uL    Neutrophils % 68.4 40.0 - 80.0 %     Immature Granulocytes %, Automated 0.4 0.0 - 0.9 %    Lymphocytes % 19.3 13.0 - 44.0 %    Monocytes % 8.9 2.0 - 10.0 %    Eosinophils % 2.4 0.0 - 6.0 %    Basophils % 0.6 0.0 - 2.0 %    Neutrophils Absolute 5.77 1.20 - 7.70 x10*3/uL    Immature Granulocytes Absolute, Automated 0.03 0.00 - 0.70 x10*3/uL    Lymphocytes Absolute 1.63 1.20 - 4.80 x10*3/uL    Monocytes Absolute 0.75 0.10 - 1.00 x10*3/uL    Eosinophils Absolute 0.20 0.00 - 0.70 x10*3/uL    Basophils Absolute 0.05 0.00 - 0.10 x10*3/uL   Comprehensive Metabolic Panel   Result Value Ref Range    Glucose 198 (H) 74 - 99 mg/dL    Sodium 136 136 - 145 mmol/L    Potassium 4.3 3.5 - 5.3 mmol/L    Chloride 104 98 - 107 mmol/L    Bicarbonate 17 (L) 21 - 32 mmol/L    Anion Gap 19 10 - 20 mmol/L    Urea Nitrogen 16 6 - 23 mg/dL    Creatinine 0.66 0.50 - 1.30 mg/dL    eGFR >90 >60 mL/min/1.73m*2    Calcium 9.4 8.6 - 10.6 mg/dL    Albumin 3.6 3.4 - 5.0 g/dL    Alkaline Phosphatase 77 33 - 120 U/L    Total Protein 7.2 6.4 - 8.2 g/dL    AST 10 9 - 39 U/L    Bilirubin, Total 0.3 0.0 - 1.2 mg/dL    ALT 6 (L) 10 - 52 U/L   CBC and Auto Differential   Result Value Ref Range    WBC 8.6 4.4 - 11.3 x10*3/uL    nRBC 0.0 0.0 - 0.0 /100 WBCs    RBC 4.80 4.50 - 5.90 x10*6/uL    Hemoglobin 13.7 13.5 - 17.5 g/dL    Hematocrit 40.8 (L) 41.0 - 52.0 %    MCV 85 80 - 100 fL    MCH 28.5 26.0 - 34.0 pg    MCHC 33.6 32.0 - 36.0 g/dL    RDW 16.8 (H) 11.5 - 14.5 %    Platelets 213 150 - 450 x10*3/uL    Neutrophils % 64.1 40.0 - 80.0 %    Immature Granulocytes %, Automated 0.3 0.0 - 0.9 %    Lymphocytes % 22.6 13.0 - 44.0 %    Monocytes % 9.6 2.0 - 10.0 %    Eosinophils % 2.7 0.0 - 6.0 %    Basophils % 0.7 0.0 - 2.0 %    Neutrophils Absolute 5.52 1.20 - 7.70 x10*3/uL    Immature Granulocytes Absolute, Automated 0.03 0.00 - 0.70 x10*3/uL    Lymphocytes Absolute 1.95 1.20 - 4.80 x10*3/uL    Monocytes Absolute 0.83 0.10 - 1.00 x10*3/uL    Eosinophils Absolute 0.23 0.00 - 0.70 x10*3/uL     Basophils Absolute 0.06 0.00 - 0.10 x10*3/uL   Blood Culture    Specimen: Peripheral Venipuncture; Blood culture   Result Value Ref Range    Blood Culture Loaded on Instrument - Culture in progress    Blood Culture    Specimen: Peripheral Venipuncture; Blood culture   Result Value Ref Range    Blood Culture Loaded on Instrument - Culture in progress    POCT GLUCOSE   Result Value Ref Range    POCT Glucose 156 (H) 74 - 99 mg/dL   Magnesium   Result Value Ref Range    Magnesium 1.87 1.60 - 2.40 mg/dL   Renal function panel   Result Value Ref Range    Glucose 158 (H) 74 - 99 mg/dL    Sodium 137 136 - 145 mmol/L    Potassium 4.1 3.5 - 5.3 mmol/L    Chloride 106 98 - 107 mmol/L    Bicarbonate 22 21 - 32 mmol/L    Anion Gap 13 10 - 20 mmol/L    Urea Nitrogen 14 6 - 23 mg/dL    Creatinine 0.68 0.50 - 1.30 mg/dL    eGFR >90 >60 mL/min/1.73m*2    Calcium 9.1 8.6 - 10.6 mg/dL    Phosphorus 3.4 2.5 - 4.9 mg/dL    Albumin 3.3 (L) 3.4 - 5.0 g/dL   CBC and Auto Differential   Result Value Ref Range    WBC 7.3 4.4 - 11.3 x10*3/uL    nRBC 0.0 0.0 - 0.0 /100 WBCs    RBC 4.76 4.50 - 5.90 x10*6/uL    Hemoglobin 13.3 (L) 13.5 - 17.5 g/dL    Hematocrit 40.0 (L) 41.0 - 52.0 %    MCV 84 80 - 100 fL    MCH 27.9 26.0 - 34.0 pg    MCHC 33.3 32.0 - 36.0 g/dL    RDW 14.2 11.5 - 14.5 %    Platelets 271 150 - 450 x10*3/uL    Neutrophils % 78.8 40.0 - 80.0 %    Immature Granulocytes %, Automated 0.4 0.0 - 0.9 %    Lymphocytes % 8.1 13.0 - 44.0 %    Monocytes % 8.6 2.0 - 10.0 %    Eosinophils % 3.4 0.0 - 6.0 %    Basophils % 0.7 0.0 - 2.0 %    Neutrophils Absolute 5.76 1.20 - 7.70 x10*3/uL    Immature Granulocytes Absolute, Automated 0.03 0.00 - 0.70 x10*3/uL    Lymphocytes Absolute 0.59 (L) 1.20 - 4.80 x10*3/uL    Monocytes Absolute 0.63 0.10 - 1.00 x10*3/uL    Eosinophils Absolute 0.25 0.00 - 0.70 x10*3/uL    Basophils Absolute 0.05 0.00 - 0.10 x10*3/uL   POCT GLUCOSE   Result Value Ref Range    POCT Glucose 156 (H) 74 - 99 mg/dL      XR tibia  fibula bilateral 2 views: Result Date: 2/1/2024     There is generalized soft tissue edema of the right left leg the right and left leg, as well as ulcerations of the distal right left leg and dorsal left midfoot and forefoot. Findings are related in part to venous stasis edema given the presence of phleboliths bilaterally. No evidence of osteomyelitis of the proximal tibia or fibula on the right or left. Aside from the ulcerations, no evidence of soft tissue gas to suggest necrotizing infection.     XR ankle left 3+ views: Result Date: 2/1/2024     LEFT ANKLE: 1. Soft tissue ulcerations of the anterolateral distal leg and dorsal midfoot/forefoot without radiographic evidence of acute osteomyelitis. 2. Chronic periosteal reaction of the distal tibia and fibula likely related to venous stasis given extensive phleboliths. 3. Status post 4th-5th transmetatarsal amputations and 3rd toe amputation at the MTP joint. 4. Multiple additional potential etiologies of ankle/foot pain as described in detail above, depending on location and clinical context. 5. No acute fracture or malalignment.        RIGHT ANKLE: 1. Large soft tissue ulceration of the medial distal right leg without radiographic evidence of acute osteomyelitis. 2. Diffuse chronic periosteal new bone formation of the distal tibia and fibula related to venous stasis edema given phleboliths. 3. Advanced Charcot arthropathy of the right midfoot with collapse of the talonavicular and calcaneocuboid joints, and chronic fragmentation, ankylosis, and prominent plantar osseous protuberance arising from the midfoot that can predispose to pressure-related ulceration and osteomyelitis with continued weight-bearing. 4. Multiple potential etiologies of ankle/foot pain as described in detail above, depending on location and clinical context.        Aortogram with bilateral lower extremity runoff ([facility] 1/12)  IMPRESSION:    1.         65% right renal artery stenosis.  2.          Apparent left renal artery stenosis.  3.         70% mid infrarenal aortic stenosis.  4.         Patent right common iliac artery and bilateral external iliac artery stents.    5.         Occluded right internal iliac artery.  6.         Severe stenosis at the origin of the left internal iliac artery.  7.         Moderate left common femoral artery plaque disease.  8.         Occluded left superficial femoral artery.  9.         Reconstitution of a diseased left above-knee popliteal artery.  10.       Mild disease involving the left below-knee popliteal artery and 1 vessel peroneal runoff to the left foot.    11.       Left peroneal collaterals distally reconstituted the posterior tibial and dorsalis pedis arteries in the left foot.    12.       Severe right common femoral artery plaque disease.  13.       Right superficial femoral artery occlusion.  14.       Right deep femoral collaterals reconstituted diseased right above-knee popliteal artery.    15.       Mild right below-knee popliteal artery disease and 1 vessel peroneal runoff to the right foot.    16.       Moderate right tibioperoneal trunk stenosis.  17.       Right peroneal artery reconstitutes the posterior tibial and dorsalis pedis arteries in the right foot.             Assessment/Plan   Principal Problem:    Wound infection    53 year old male with a history of type 2 diabetes mellitus, hypertension, hyperlipidemia, coronary artery disease status post CABGx3 3/22, history of congestive heart failure, morbid obesity, chronic venous stasis dermatitis, and long-standing and ongoing tobacco use, who has peripheral arterial occlusive disease and has non-healing wounds in both lower legs for 1.5 yrs.  He previously has undergone a left common iliac and  bilateral external iliac artery stenting as well as left SFA and popliteal angioplasties in 2017, in addition to some toe amputations at that time in his left foot.  He now presents with tissue loss in  both lower legs with worsening ulcerations involving his right medial lower leg, left medial foot and left anterolateral lower leg.  He is admitted for concern for infection given extensive malodorous drainage; as well as long term planning with podiatry and vascular surgery.      #Bilateral lower extremity wounds, acute on chronic   - wound culture 1/30 podiatry clinic: 2+ few polymorphonuclear leukocytes, moderate mixed gram positive and gram negative bacteria  - blood culture x2 1/31  - vascular surgery consult in AM (PT 1+ present bilaterally on exam)  - podiatry consult for skin graft (patient previously refused)  -Continue vanc/zosyn (1/31-   - Xrays ordered for evaluation of osteomyelitis/gas- if +, will add clindamycin for coverage  -provide resources to stop smoking  - nutrition consult to help with wound healing  - will follow up recs from podiatry and vascular surgery     #T2DM  -holding metformin, glipizide, ozempic  -mild SSI      #HDL  #CAD status post CABG X3  -continue atorvastatin     #HTN  #CHF  -continue losartan   -continue jardiance     #PAD  #chronic venous stasis dermatitis s/p left common iliac, bilateral external iliac artery stenting, left SFA and popliteal angioplasties  #left toe amputation   -continue aspirin 81mg BID     F: None  E: Replete PRN  N: Carb controlled diet  GI ppx: None  DVT ppx: Lovenox BID (dosing for weight)  Code: FULL  NOK: Laura (sister in law) or Mother [phone]           Federico Carlos MD

## 2024-02-01 NOTE — PROGRESS NOTES
Pharmacy Medication History Review    Tim Stokes is a 53 y.o. male admitted for Wound infection. Pharmacy reviewed the patient's [id number] medications and allergies for accuracy.    The list below reflects the updated PTA list. Comments regarding how patient may be taking medications differently can be found in the Admit Orders Activity  Prior to Admission Medications   Prescriptions Last Dose Informant Patient Reported?   aspirin 81 mg EC tablet 1/30/2024 Self Yes   Sig: Take 1 tablet (81 mg) by mouth once daily.   atorvastatin (Lipitor) 40 mg tablet  Self Yes   Sig: Take 1 tablet (40 mg) by mouth once daily.   empagliflozin (Jardiance) 25 mg 1/30/2024 Self Yes   Sig: Take 1 tablet (25 mg) by mouth once daily.   glipiZIDE (Glucotrol) 5 mg tablet 1/30/2024 Self Yes   Sig: Take 1 tablet (5 mg) by mouth 2 times a day.   ibuprofen 200 mg tablet Past Week Self Yes   Sig: Take 3 tablets (600 mg) by mouth every 6 hours if needed for mild pain (1 - 3) or moderate pain (4 - 6).   losartan (Cozaar) 50 mg tablet 1/30/2024 Self Yes   Sig: Take 1 tablet (50 mg) by mouth once daily.   metFORMIN (Glucophage) 500 mg tablet 1/30/2024 Self Yes   Sig: Take 1 tablet (500 mg) by mouth once daily in the evening. Take with meals. Do not crush, chew, or split.   semaglutide (OZEMPIC) 1 mg/dose (4 mg/3 mL) pen injector Unknown Self Yes   Sig: Inject 1 mg under the skin every 7 days.      Facility-Administered Medications: None        The list below reflects the updated allergy list. Please review each documented allergy for additional clarification and justification.  Allergies  Reviewed by Jazmine Carr [facility] on 2/1/2024   No Known Allergies         Patient declines M2B at discharge. Pharmacy has been updated to [facility].    Sources used to confirm home medication list include: Patient interview, OARRS, Care Everywhere, medication fill history, Mobile Visit Summary 1/25/24..    Below are additional concerns with  the patient's PTA list.  None to note.    Jazmine Carr [facility]   Transitions of Care Pharmacist  [facility]  Please reach out via Secure Chat for questions, or if no response call picoChip or vocera [facility]

## 2024-02-01 NOTE — CONSULTS
Reason For Consult  BL LE wounds    History Of Present Illness  Tim Stokes is a 53 y.o. male presenting with chronic bilateral lower extremity wounds. Patient has an extensive cardiac and vascular history including CABG in 2022, L LOUISE BL EIA stents and L SFA and popliteal angioplasties in 2017. Patient was being seen by vascular surgery at [facility] who recently performed an aortogram showing arthrosclerotic disease including BL occluded SFA (See full report below). They had planned for fem pop bypass and fem endarterectomy on 1/24 but this was cancelled. Patient was seen by Dr. Boone 1/22 with plans to discuss with vascular surgery at .     Today, patient arrived to ED at the recommendation of his outpatient podiatrist who was concerned for possible osteomyelitis. In the ED patient was started on van/zosyn. Workup included CBC which was unremarkable and BL LE x-rays which did not show evidence of osteomyelitis. Patient states that the wounds have been there for several years and have progressively gotten worse. He has a home health nurse who assists with dressing changes. He is able to ambulate but is limited by pain. He states the pain is a sharp pain related to the wounds, denies cramping pain in the legs. He denies chest pain or shortness of breath. He does not wear oxygen at home.      Past Medical History  He has no past medical history on file.    Surgical History  He has no past surgical history on file.     Social History  He has no history on file for tobacco use, alcohol use, and drug use.    Family History  No family history on file.     Allergies  Patient has no known allergies.    Review of Systems  ROS negative except for what is stated in HPI.     Physical Exam  Physical Exam  Constitutional:       Comments: Uncomfortable appearing   HENT:      Head: Normocephalic and atraumatic.      Mouth/Throat:      Mouth: Mucous membranes are moist.   Eyes:      Conjunctiva/sclera: Conjunctivae normal.    Cardiovascular:      Rate and Rhythm: Normal rate.   Pulmonary:      Effort: Pulmonary effort is normal.      Comments: On RA  Musculoskeletal:      Comments: Pulse exam limited due to pain and wounds. L foot with amputation of 3-5 digits.   Skin:     Comments: Large wound at medial aspect of R ankle with areas of necrosis. Unable to assess R foot due to patient refusal. Majority of L foot and lower leg with similar necrotic wounds. See pictures below.   Neurological:      General: No focal deficit present.      Mental Status: He is alert.   Psychiatric:         Behavior: Behavior is withdrawn.                  Last Recorded Vitals  Blood pressure 135/70, pulse 77, temperature 36.6 °C (97.9 °F), resp. rate 16, weight 133 kg (293 lb), SpO2 98 %.    Relevant Results  Aortogram with bilateral lower extremity runoff. 1/12/24 [facility]  IMPRESSION:    1.         65% right renal artery stenosis.  2.         Apparent left renal artery stenosis.  3.         70% mid infrarenal aortic stenosis.  4.         Patent right common iliac artery and bilateral external iliac artery stents.    5.         Occluded right internal iliac artery.  6.         Severe stenosis at the origin of the left internal iliac artery.  7.         Moderate left common femoral artery plaque disease.  8.         Occluded left superficial femoral artery.  9.         Reconstitution of a diseased left above-knee popliteal artery.  10.       Mild disease involving the left below-knee popliteal artery and 1 vessel peroneal runoff to the left foot.    11.       Left peroneal collaterals distally reconstituted the posterior tibial and dorsalis pedis arteries in the left foot.    12.       Severe right common femoral artery plaque disease.  13.       Right superficial femoral artery occlusion.  14.       Right deep femoral collaterals reconstituted diseased right above-knee popliteal artery.    15.       Mild right below-knee popliteal artery disease and 1 vessel  peroneal runoff to the right foot.    16.       Moderate right tibioperoneal trunk stenosis.  17.       Right peroneal artery reconstitutes the posterior tibial and dorsalis pedis arteries in the right foot.     Assessment/Plan   Patient is a 53M with an extensive cardiac and vascular history including CABG in 2022, L LOUISE BL EIA stents and L SFA and popliteal angioplasties in 2017. Patient was being seen by vascular surgery at [facility] who recently performed an aortogram showing arthrosclerotic disease including BL occluded SFA (See full report above). They had planned for fem pop bypass and fem endarterectomy on 1/24 but this was cancelled. Patient was seen by Dr. Boone 1/22 with plans to discuss with vascular surgery at . Patient presented to the ED today with non-healing BL LE wounds.    - EDIN/PVR  - Vein mapping  - Further operative plans to be discussed following results of these studies    Patient discussed with attending physician, Dr. Gary.    Callie Presley MD  Vascular Surgery [id number]

## 2024-02-01 NOTE — CONSULTS
Consults    Reason For Consult  Bilateral leg wounds     History Of Present Illness  Tim Stokes is a 53 y.o. male presenting with bilateral leg wounds. Wounds have been present for 1.5 years. Last couple months has had worsening of the wounds, complaining of severe pain and mal odor of leg wounds currently.  Patient has extensive history with the wounds, was scheduled on the 26th for a bypass at [facility], however cancelled after meeting with EVLS here a week before the surgery where they advised against  a bypass per the patient. Patient presented to [facility] ED after being sent in by wound care provider. Had swab done currently growing mix bacteria. Patient is upset and unsure where he would like treatment at. Currently refusing dressing changes.     Past Medical History  He has no past medical history on file.    Surgical History  He has no past surgical history on file.     Social History  He has no history on file for tobacco use, alcohol use, and drug use.    Family History  No family history on file.     Allergies  Patient has no known allergies.    Review of Systems    REVIEW OF SYSTEMS  GENERAL:  Negative for malaise, significant weight loss, fever  HEENT:  No changes in hearing or vision, no nose bleeds or other nasal problems and Negative for frequent or significant headaches  NECK:  Negative for lumps, goiter, pain and significant neck swelling  RESPIRATORY:  Negative for cough, wheezing and shortness of breath  CARDIOVASCULAR:  Poor ciculation  GI:  Negative for abdominal discomfort, blood in stools or black stools and change in bowel habits  :  Negative for dysuria, frequency and incontinence  MUSCULOSKELETAL:  POP.   PSYCH:  Negative for sleep disturbance, mood disorder and recent psychosocial stressors  HEMATOLOGY/LYMPHOLOGY:  Negative for prolonged bleeding, bruising easily, and swollen nodes.  ENDOCRINE:  Negative for cold or heat intolerance, polyuria, polydipsia and goiter  NEURO: Negative, denies  any burning, tingling or numbness     Objective:   Vasc: DP and PT pulses are palpable bilateral.  CFT is less than 3 seconds bilateral.  Skin temperature is warm to cool proximal to distal bilateral.  Extensive erythema.    Neuro:  Light touch is intact to the foot bilateral.  Protective sensation is intact to the foot when tested with the 5.07 SWM bilateral.  There is no clonus noted.  The hallux is downgoing bilateral.      Derm: Skin is supple with normal texture and turgor noted.  Webspaces are clean, dry and intact bilateral.  b/l leg wounds reviewed pictures. Right medial calf with extensive fascial and muscle necrosis. Wound edges necrotic, kiley-wound erythema. Malodor noted and moderate drainage. Left leg and foot with extensive wounds with moderate kiley-wound erythema, moderate drainage, 100% fibrotic base.    Ortho: POP and rest pain. S/p 5th ray resection       Physical Exam     Last Recorded Vitals  Blood pressure 158/87, pulse 80, temperature 35.8 °C (96.5 °F), resp. rate 18, weight 133 kg (293 lb), SpO2 99 %.    Relevant Results  XR negative for OM, cellulitis.      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  - Charts, labs, vitals and imaging all reviewed.   - Imaging: reviewed, soft tissue deficit noted. Right ankle and midfoot collapse consistent with charcot  - Labs: no leukocytosis  - Wound culture: growing mix bacteria  - Abx:  per primary    RECOMMENDATIONS:   - Recommending empiric abx  - Discussion with primary about patient and need of EVLS/vascular.   - EVLS consult per primary    - High risk of losing left leg to proximal amputation. Likely non-salvageable left foot and leg. Extensive wounds and extended period of time of wounds and  history of severe PAD, likely not able to salvage. Patient does not want multiple procedures and amputations. Fed up with left chronic issues, was open to idea of proximal amputation.   - Right still high risk, but possible salvageability if inflow is corrected. Charcot ankle midfoot collapse will add to complexity of salvageability and rehab.   - Discussed complexity with EVLS and primary as well as outpatient podiatrist.   - No surgical intervention from podiatry standpoint until vascular needs are met.  - Dressings: not replaced due to refusal. Will switch to dakins when patient allows  - Nursing staff is able to change/reinforce dressing if & as necessary until next day’s dressing change. Thank you.  - Podiatry to continue following while in house     Case to be discussed with attending, A&P above reflect tentative plan. Please await for final signature from attending physician on service.    Ian Powell DPM PGY-1  Podiatric Medicine & Surgery  Please Epic message with questions

## 2024-02-01 NOTE — HOSPITAL COURSE
53 year old male with a history of type 2 diabetes mellitus, hypertension, hyperlipidemia, coronary artery disease status post CABGx3 3/22, history of congestive heart failure, morbid obesity, chronic venous stasis dermatitis, and long-standing and ongoing tobacco use, who has peripheral arterial occlusive disease and has non-healing wounds in both lower legs for 1.5 yrs.  He previously has undergone a left common iliac and  bilateral external iliac artery stenting as well as left SFA and popliteal angioplasties in 2017, in addition to some toe amputations at that time in his left foot.  He now presents with tissue loss in both lower legs with worsening ulcerations involving his right medial lower leg, left medial foot and left anterolateral lower leg.  He is admitted for concern for infection given extensive malodorous drainage; as well as long term planning with podiatry and vascular surgery.   Was treated on Vanc/zoysn 1/31-2/3 switched to Augmentin on discharge.   Since admission, vascular sugery, podiatry, and wound care have been consulted for consideration of bypass vs amputation.  Per Vascular they had discussions with several members of vascular surgery service and endovascular limb salvage service. After discussion with Dr. Godinez, at this time, patient does not have a great option for treatment of his aortic lesions, as open surgery would likely prove complicated and highly risky. Dr. Boone is involved with the planning and will attempt endovascular revascularization on Friday, with discharge in the meantime until procedure.   Patient does still need the noninvasive studies to be performed that have been ordered (EDIN/PVR, vein mapping, and venous duplex), for continued interventional/ surgical planning of all options; Vascular surgery will coordinate them being done outpatient.

## 2024-02-01 NOTE — H&P
History Of Present Illness  Tim Stokes is a 53 y.o. male with T2DM, HTN, HDL, CAD status post CABG X3, CHF, PAD, chronic venous stasis dermatitis s/p left common iliac, bilateral external iliac artery stenting, left SFA and popliteal angioplasties, left toe amputation presents with concern of poor circulation in bilateral lower extremities.     Pt has chronic venous stasis wounds located on b/l lower extremities for the past 1.5 years. He notes that for the particular last couple months, his wounds have had worsening odor, pustular drainage, and pain with poor healing.      Per home care notes, appears that patient has reported left lower leg, ankle, and foot wounds all declining in nature with additional new right lower leg wound.  Left medial ankle and left dorsal foot wounds started in March 2022. Left anterior leg started July 2023; right medial lower leg started December 2023. Mid chest wound emerged after surgery around 1 year ago.     Of note, patient was seen on 12/1/2023 to expedite vascular surgery. Vascular surgery evaluated 1/12, with inability to stent or apply angioplasty with possible thoughts of bypass surgery. Both Dr. Baez at [facility] and  vascular surgery did not recommend limb flow.     In the ED, VS T 36.4, RR 16, HR 73, /84, 98% on room air  Labs: /4.3/104/17/16/0.66, mg [ ]   Interventions: Blood cultures ordered, IV Vanc/Zosyn      Past Medical History  Surgical History  T2DM, HTN, HDL, CAD status post CABG X3, CHF, PAD, chronic venous stasis dermatitis s/p left common iliac, bilateral external iliac artery stenting, left SFA and popliteal angioplasties, left toe amputation      Social History  current smoker (not interested in smoking cessation)    Family History  No family history on file.     Allergies  Patient has no known allergies.    Review of Systems   Constitutional:  Negative for chills, fever and unexpected weight change.   HENT: Negative.     Respiratory: Negative.      Cardiovascular: Negative.  Negative for leg swelling.   Gastrointestinal: Negative.    Skin:  Positive for wound. Negative for color change and pallor.     Physical Exam  Constitutional:       Appearance: He is obese.   HENT:      Head: Normocephalic and atraumatic.      Mouth/Throat:      Mouth: Mucous membranes are moist.   Eyes:      Extraocular Movements: Extraocular movements intact.      Pupils: Pupils are equal, round, and reactive to light.   Cardiovascular:      Pulses: Normal pulses.      Heart sounds: No murmur heard.     Comments: 1+ PT pulses bilaterally  Pulmonary:      Effort: Pulmonary effort is normal.      Breath sounds: Normal breath sounds.   Abdominal:      General: Bowel sounds are normal.      Palpations: Abdomen is soft.   Musculoskeletal:      Comments: R leg appears bigger chronically since procedure; but not edematous.    Skin:     Comments: deep, pustular, erythematous skin around bilateral lower extremities   Neurological:      Mental Status: He is alert.      Comments: Full range of motion, normal intact sensation in BUE and BLE       Last Recorded Vitals  Blood pressure 125/75, pulse 83, temperature 36.7 °C (98.1 °F), temperature source Oral, resp. rate 18, weight 133 kg (293 lb), SpO2 97 %.    Relevant Results  Scheduled medications  aspirin, 81 mg, oral, Daily  atorvastatin, 40 mg, oral, Daily  empagliflozin, 25 mg, oral, Daily  enoxaparin, 40 mg, subcutaneous, q12h GAGE  insulin lispro, 0-5 Units, subcutaneous, TID with meals  losartan, 50 mg, oral, Daily  melatonin, 6 mg, oral, Nightly  piperacillin-tazobactam, 3.375 g, intravenous, q6h  polyethylene glycol, 17 g, oral, Daily  vancomycin, 1,500 mg, intravenous, q12h    PRN medications  PRN medications: acetaminophen **OR** acetaminophen **OR** acetaminophen, dextrose 10 % in water (D10W), dextrose, glucagon, oxyCODONE    XR tibia fibula bilateral 2 views: Result Date: 2/1/2024    There is generalized soft tissue edema of the right  left leg the right and left leg, as well as ulcerations of the distal right left leg and dorsal left midfoot and forefoot. Findings are related in part to venous stasis edema given the presence of phleboliths bilaterally. No evidence of osteomyelitis of the proximal tibia or fibula on the right or left. Aside from the ulcerations, no evidence of soft tissue gas to suggest necrotizing infection.    XR ankle left 3+ views: Result Date: 2/1/2024    LEFT ANKLE: 1. Soft tissue ulcerations of the anterolateral distal leg and dorsal midfoot/forefoot without radiographic evidence of acute osteomyelitis. 2. Chronic periosteal reaction of the distal tibia and fibula likely related to venous stasis given extensive phleboliths. 3. Status post 4th-5th transmetatarsal amputations and 3rd toe amputation at the MTP joint. 4. Multiple additional potential etiologies of ankle/foot pain as described in detail above, depending on location and clinical context. 5. No acute fracture or malalignment.       RIGHT ANKLE: 1. Large soft tissue ulceration of the medial distal right leg without radiographic evidence of acute osteomyelitis. 2. Diffuse chronic periosteal new bone formation of the distal tibia and fibula related to venous stasis edema given phleboliths. 3. Advanced Charcot arthropathy of the right midfoot with collapse of the talonavicular and calcaneocuboid joints, and chronic fragmentation, ankylosis, and prominent plantar osseous protuberance arising from the midfoot that can predispose to pressure-related ulceration and osteomyelitis with continued weight-bearing. 4. Multiple potential etiologies of ankle/foot pain as described in detail above, depending on location and clinical context.       Aortogram with bilateral lower extremity runoff ([facility] 1/12)  IMPRESSION:    1.         65% right renal artery stenosis.  2.         Apparent left renal artery stenosis.  3.         70% mid infrarenal aortic stenosis.  4.         Patent  right common iliac artery and bilateral external iliac artery stents.    5.         Occluded right internal iliac artery.  6.         Severe stenosis at the origin of the left internal iliac artery.  7.         Moderate left common femoral artery plaque disease.  8.         Occluded left superficial femoral artery.  9.         Reconstitution of a diseased left above-knee popliteal artery.  10.       Mild disease involving the left below-knee popliteal artery and 1 vessel peroneal runoff to the left foot.    11.       Left peroneal collaterals distally reconstituted the posterior tibial and dorsalis pedis arteries in the left foot.    12.       Severe right common femoral artery plaque disease.  13.       Right superficial femoral artery occlusion.  14.       Right deep femoral collaterals reconstituted diseased right above-knee popliteal artery.    15.       Mild right below-knee popliteal artery disease and 1 vessel peroneal runoff to the right foot.    16.       Moderate right tibioperoneal trunk stenosis.  17.       Right peroneal artery reconstitutes the posterior tibial and dorsalis pedis arteries in the right foot.    Assessment/Plan   53 year old male with a history of type 2 diabetes mellitus, hypertension, hyperlipidemia, coronary artery disease status post CABGx3 3/22, history of congestive heart failure, morbid obesity, chronic venous stasis dermatitis, and long-standing and ongoing tobacco use, who has peripheral arterial occlusive disease and has non-healing wounds in both lower legs for 1.5 yrs.  He previously has undergone a left common iliac and  bilateral external iliac artery stenting as well as left SFA and popliteal angioplasties in 2017, in addition to some toe amputations at that time in his left foot.  He now presents with tissue loss in both lower legs with worsening ulcerations involving his right medial lower leg, left medial foot and left anterolateral lower leg.  He is admitted for  concern for infection given extensive malodorous drainage; as well as long term planning with podiatry and vascular surgery.     #Bilateral lower extremity wounds, acute on chronic   [ ] wound culture 1/30 podiatry clinic: 2+ few polymorphonuclear leukocytes, moderate mixed gram positive and gram negative bacteria  [ ] blood culture x2 1/31  [ ] vascular surgery consult in AM (PT 1+ present bilaterally on exam)  [ ] podiatry consult for skin graft (patient previously refused)  -Continue vanc/zosyn (1/31-   [ ] Xrays ordered for evaluation of osteomyelitis/gas- if +, will add clindamycin for coverage  -provide resources to stop smoking  [ ] nutrition consult to help with wound healing    #T2DM  -holding metformin, glipizide, ozempic  -mild SSI     #HDL  #CAD status post CABG X3  -continue atorvastatin    #HTN  #CHF  -continue losartan   -continue jardiance    #PAD  #chronic venous stasis dermatitis s/p left common iliac, bilateral external iliac artery stenting, left SFA and popliteal angioplasties  #left toe amputation   -continue aspirin 81mg BID    F: None  E: Replete PRN  N: Carb controlled diet  GI ppx: None  DVT ppx: Lovenox BID (dosing for weight)    Code: FULL  NOK: Laura (sister in law)     To be fully staffed with attending in AM.     Ravi Corey MD

## 2024-02-02 LAB
ALBUMIN SERPL BCP-MCNC: 3.5 G/DL (ref 3.4–5)
ANION GAP SERPL CALC-SCNC: 18 MMOL/L (ref 10–20)
BASOPHILS # BLD AUTO: 0.03 X10*3/UL (ref 0–0.1)
BASOPHILS NFR BLD AUTO: 0.5 %
BUN SERPL-MCNC: 14 MG/DL (ref 6–23)
CALCIUM SERPL-MCNC: 9.1 MG/DL (ref 8.6–10.6)
CHLORIDE SERPL-SCNC: 102 MMOL/L (ref 98–107)
CO2 SERPL-SCNC: 19 MMOL/L (ref 21–32)
CREAT SERPL-MCNC: 0.71 MG/DL (ref 0.5–1.3)
EGFRCR SERPLBLD CKD-EPI 2021: >90 ML/MIN/1.73M*2
EOSINOPHIL # BLD AUTO: 0.18 X10*3/UL (ref 0–0.7)
EOSINOPHIL NFR BLD AUTO: 3.3 %
ERYTHROCYTE [DISTWIDTH] IN BLOOD BY AUTOMATED COUNT: 14.3 % (ref 11.5–14.5)
GLUCOSE BLD MANUAL STRIP-MCNC: 142 MG/DL (ref 74–99)
GLUCOSE BLD MANUAL STRIP-MCNC: 153 MG/DL (ref 74–99)
GLUCOSE BLD MANUAL STRIP-MCNC: 191 MG/DL (ref 74–99)
GLUCOSE BLD MANUAL STRIP-MCNC: 214 MG/DL (ref 74–99)
GLUCOSE SERPL-MCNC: 135 MG/DL (ref 74–99)
HCT VFR BLD AUTO: 43.5 % (ref 41–52)
HGB BLD-MCNC: 13.4 G/DL (ref 13.5–17.5)
IMM GRANULOCYTES # BLD AUTO: 0.03 X10*3/UL (ref 0–0.7)
IMM GRANULOCYTES NFR BLD AUTO: 0.5 % (ref 0–0.9)
LYMPHOCYTES # BLD AUTO: 0.33 X10*3/UL (ref 1.2–4.8)
LYMPHOCYTES NFR BLD AUTO: 6 %
MAGNESIUM SERPL-MCNC: 2.11 MG/DL (ref 1.6–2.4)
MCH RBC QN AUTO: 27.6 PG (ref 26–34)
MCHC RBC AUTO-ENTMCNC: 30.8 G/DL (ref 32–36)
MCV RBC AUTO: 90 FL (ref 80–100)
MONOCYTES # BLD AUTO: 0.49 X10*3/UL (ref 0.1–1)
MONOCYTES NFR BLD AUTO: 8.9 %
NEUTROPHILS # BLD AUTO: 4.42 X10*3/UL (ref 1.2–7.7)
NEUTROPHILS NFR BLD AUTO: 80.8 %
NRBC BLD-RTO: 0 /100 WBCS (ref 0–0)
PHOSPHATE SERPL-MCNC: 3.4 MG/DL (ref 2.5–4.9)
PLATELET # BLD AUTO: 217 X10*3/UL (ref 150–450)
POTASSIUM SERPL-SCNC: 4.3 MMOL/L (ref 3.5–5.3)
RBC # BLD AUTO: 4.86 X10*6/UL (ref 4.5–5.9)
SODIUM SERPL-SCNC: 135 MMOL/L (ref 136–145)
WBC # BLD AUTO: 5.5 X10*3/UL (ref 4.4–11.3)

## 2024-02-02 PROCEDURE — 80069 RENAL FUNCTION PANEL: CPT

## 2024-02-02 PROCEDURE — 83735 ASSAY OF MAGNESIUM: CPT

## 2024-02-02 PROCEDURE — 82947 ASSAY GLUCOSE BLOOD QUANT: CPT

## 2024-02-02 PROCEDURE — 85025 COMPLETE CBC W/AUTO DIFF WBC: CPT

## 2024-02-02 PROCEDURE — 2500000004 HC RX 250 GENERAL PHARMACY W/ HCPCS (ALT 636 FOR OP/ED): Performed by: STUDENT IN AN ORGANIZED HEALTH CARE EDUCATION/TRAINING PROGRAM

## 2024-02-02 PROCEDURE — 36415 COLL VENOUS BLD VENIPUNCTURE: CPT

## 2024-02-02 PROCEDURE — 99232 SBSQ HOSP IP/OBS MODERATE 35: CPT

## 2024-02-02 PROCEDURE — 1100000001 HC PRIVATE ROOM DAILY

## 2024-02-02 PROCEDURE — 2500000002 HC RX 250 W HCPCS SELF ADMINISTERED DRUGS (ALT 637 FOR MEDICARE OP, ALT 636 FOR OP/ED): Performed by: STUDENT IN AN ORGANIZED HEALTH CARE EDUCATION/TRAINING PROGRAM

## 2024-02-02 PROCEDURE — 2500000001 HC RX 250 WO HCPCS SELF ADMINISTERED DRUGS (ALT 637 FOR MEDICARE OP): Performed by: STUDENT IN AN ORGANIZED HEALTH CARE EDUCATION/TRAINING PROGRAM

## 2024-02-02 RX ADMIN — ACETAMINOPHEN 650 MG: 325 TABLET ORAL at 06:48

## 2024-02-02 RX ADMIN — ACETAMINOPHEN 650 MG: 325 TABLET ORAL at 22:57

## 2024-02-02 RX ADMIN — MELATONIN 6 MG: 3 TAB ORAL at 20:43

## 2024-02-02 RX ADMIN — ACETAMINOPHEN 650 MG: 325 TABLET ORAL at 12:02

## 2024-02-02 RX ADMIN — OXYCODONE HYDROCHLORIDE 5 MG: 5 TABLET ORAL at 00:13

## 2024-02-02 RX ADMIN — PIPERACILLIN SODIUM AND TAZOBACTAM SODIUM 3.38 G: 3; .375 INJECTION, SOLUTION INTRAVENOUS at 00:13

## 2024-02-02 RX ADMIN — DEXTROSE MONOHYDRATE 1500 MG: 50 INJECTION, SOLUTION INTRAVENOUS at 09:57

## 2024-02-02 RX ADMIN — ATORVASTATIN CALCIUM 40 MG: 40 TABLET, FILM COATED ORAL at 09:51

## 2024-02-02 RX ADMIN — PIPERACILLIN SODIUM AND TAZOBACTAM SODIUM 3.38 G: 3; .375 INJECTION, SOLUTION INTRAVENOUS at 17:43

## 2024-02-02 RX ADMIN — LOSARTAN POTASSIUM 50 MG: 50 TABLET, FILM COATED ORAL at 09:51

## 2024-02-02 RX ADMIN — DEXTROSE MONOHYDRATE 1500 MG: 50 INJECTION, SOLUTION INTRAVENOUS at 22:52

## 2024-02-02 RX ADMIN — PIPERACILLIN SODIUM AND TAZOBACTAM SODIUM 3.38 G: 3; .375 INJECTION, SOLUTION INTRAVENOUS at 06:07

## 2024-02-02 RX ADMIN — ENOXAPARIN SODIUM 40 MG: 100 INJECTION SUBCUTANEOUS at 12:02

## 2024-02-02 RX ADMIN — ASPIRIN 81 MG: 81 TABLET, COATED ORAL at 09:51

## 2024-02-02 RX ADMIN — ACETAMINOPHEN 650 MG: 325 TABLET ORAL at 17:33

## 2024-02-02 RX ADMIN — OXYCODONE HYDROCHLORIDE 5 MG: 5 TABLET ORAL at 23:32

## 2024-02-02 RX ADMIN — PIPERACILLIN SODIUM AND TAZOBACTAM SODIUM 3.38 G: 3; .375 INJECTION, SOLUTION INTRAVENOUS at 12:03

## 2024-02-02 RX ADMIN — ENOXAPARIN SODIUM 40 MG: 100 INJECTION SUBCUTANEOUS at 20:43

## 2024-02-02 RX ADMIN — EMPAGLIFLOZIN 25 MG: 25 TABLET, FILM COATED ORAL at 09:51

## 2024-02-02 RX ADMIN — INSULIN LISPRO 2 UNITS: 100 INJECTION, SOLUTION INTRAVENOUS; SUBCUTANEOUS at 17:36

## 2024-02-02 RX ADMIN — OXYCODONE HYDROCHLORIDE 5 MG: 5 TABLET ORAL at 17:33

## 2024-02-02 ASSESSMENT — PAIN SCALES - GENERAL
PAINLEVEL_OUTOF10: 4
PAINLEVEL_OUTOF10: 8
PAINLEVEL_OUTOF10: 4
PAINLEVEL_OUTOF10: 8
PAINLEVEL_OUTOF10: 8

## 2024-02-02 ASSESSMENT — PAIN DESCRIPTION - LOCATION
LOCATION: LEG
LOCATION: LEG

## 2024-02-02 ASSESSMENT — PAIN - FUNCTIONAL ASSESSMENT
PAIN_FUNCTIONAL_ASSESSMENT: 0-10

## 2024-02-02 NOTE — PROGRESS NOTES
Tim Stokes is a 53 y.o. male on day 1 of admission presenting with Wound infection.    Subjective   Patient NAD sitting in bed. Frustrated on not having plan. Allowed dressing change today. Does not want dakins dressing.        Physical Exam    Objective     REVIEW OF SYSTEMS  GENERAL:  Negative for malaise, significant weight loss, fever  HEENT:  No changes in hearing or vision, no nose bleeds or other nasal problems and Negative for frequent or significant headaches  NECK:  Negative for lumps, goiter, pain and significant neck swelling  RESPIRATORY:  Negative for cough, wheezing and shortness of breath  CARDIOVASCULAR:  Poor ciculation  GI:  Negative for abdominal discomfort, blood in stools or black stools and change in bowel habits  :  Negative for dysuria, frequency and incontinence  MUSCULOSKELETAL:  POP.   PSYCH:  Negative for sleep disturbance, mood disorder and recent psychosocial stressors  HEMATOLOGY/LYMPHOLOGY:  Negative for prolonged bleeding, bruising easily, and swollen nodes.  ENDOCRINE:  Negative for cold or heat intolerance, polyuria, polydipsia and goiter  NEURO: Negative, denies any burning, tingling or numbness      Objective:   Vasc: DP and PT pulses are palpable bilateral.  CFT is less than 3 seconds bilateral.  Skin temperature is warm to cool proximal to distal bilateral.  Extensive erythema.     Neuro:  Light touch is intact to the foot bilateral.  Protective sensation is intact to the foot when tested with the 5.07 SWM bilateral.  There is no clonus noted.  The hallux is downgoing bilateral.       Derm: Skin is supple with normal texture and turgor noted.  Right medial calf with extensive fascial and muscle necrosis. Wound edges necrotic, kiley-wound erythema. Malodor noted and moderate drainage. Left leg and foot with extensive wounds with moderate kiley-wound erythema, moderate drainage, 100% fibrotic base.     Ortho: POP and rest pain. S/p 5th ray resection     Last Recorded  Vitals  Blood pressure 124/75, pulse 81, temperature 36.3 °C (97.3 °F), temperature source Temporal, resp. rate 16, weight 133 kg (293 lb), SpO2 97 %.    Intake/Output last 3 Shifts:  I/O last 3 completed shifts:  In: - (0 mL/kg)   Out: 750 (5.6 mL/kg) [Urine:750 (0.2 mL/kg/hr)]  Weight: 132.9 kg     Relevant Results  XR negative for OM, cellulitis.      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  - Charts, labs, vitals and imaging all reviewed.   - Imaging: reviewed, soft tissue deficit noted. Right ankle and midfoot collapse consistent with charcot  - Labs: no leukocytosis  - Wound culture: growing mix bacteria  - Abx:  per primary     RECOMMENDATIONS:   - Recommending empiric abx  - [facility] has no plan for intervention at this time.     - Vascular working up for possible operation pending results of EDIN/PVR and vein mapping.   - High risk of losing left leg to proximal amputation. Likely non-salvageable left foot and leg. Extensive wounds and extended period of time of wounds and history of severe PAD, likely not able to salvage. Patient does not want multiple procedures and amputations. Fed up with left chronic issues, was open to idea of proximal amputation.   - Right still high risk, but possible salvageability if inflow is corrected. Charcot ankle midfoot collapse will add to complexity of salvageability and rehab.   - No surgical intervention from podiatry standpoint until vascular needs are met.  - Dressings: Betadine WTD  - Nursing staff is able to change/reinforce dressing if & as necessary until next day’s dressing change. Thank you.  - Podiatry to continue to follow from [place] while in house      Case to be discussed with attending, A&P above reflect  tentative plan. Please await for final signature from attending physician on service.     Ian Powell DPM PGY-1  Podiatric Medicine & Surgery  Please Epic message with questions

## 2024-02-02 NOTE — PROGRESS NOTES
I met with Tim at the bedside regarding discharge planning and home going needs. Patient states that he lives  home  alone and that he is usually independent with ADL's and that he does have a walker in the home. Patient is not medically cleared for discharge at this time. I will continue to follow with a safe discharge plan.

## 2024-02-02 NOTE — PROGRESS NOTES
Tim Stokes is a 53 y.o. male on day 1 of admission presenting with Wound infection.    Subjective   Patient doing okay this morning without new complaints. Is frustrated regarding the lack of clarity regarding his plan .     Objective     Physical Exam  Constitutional:       Appearance: He is obese.   HENT:      Head: Normocephalic and atraumatic.      Mouth/Throat:      Mouth: Mucous membranes are moist.   Eyes:      Extraocular Movements: Extraocular movements intact.      Pupils: Pupils are equal, round, and reactive to light.   Cardiovascular:      Pulses: Normal pulses.      Heart sounds: No murmur heard.     Comments: 1+ PT pulses bilaterally  Pulmonary:      Effort: Pulmonary effort is normal.      Breath sounds: Normal breath sounds.   Abdominal:      General: Bowel sounds are normal.      Palpations: Abdomen is soft.   Musculoskeletal:      Comments: R leg appears bigger chronically since procedure; but not edematous.    Skin:     Comments: deep, pustular, erythematous skin around bilateral lower extremities   Neurological:      Mental Status: He is alert.      Comments: Full range of motion, normal intact sensation in BUE and BLE     Last Recorded Vitals  Blood pressure 124/75, pulse 81, temperature 36.3 °C (97.3 °F), temperature source Temporal, resp. rate 16, weight 133 kg (293 lb), SpO2 97 %.  Intake/Output last 3 Shifts:  I/O last 3 completed shifts:  In: - (0 mL/kg)   Out: 750 (5.6 mL/kg) [Urine:750 (0.2 mL/kg/hr)]  Weight: 132.9 kg     Relevant Results              Results for orders placed or performed during the hospital encounter of 01/31/24 (from the past 24 hour(s))   POCT GLUCOSE   Result Value Ref Range    POCT Glucose 175 (H) 74 - 99 mg/dL   POCT GLUCOSE   Result Value Ref Range    POCT Glucose 197 (H) 74 - 99 mg/dL   CBC and Auto Differential   Result Value Ref Range    WBC 5.5 4.4 - 11.3 x10*3/uL    nRBC 0.0 0.0 - 0.0 /100 WBCs    RBC 4.86 4.50 - 5.90 x10*6/uL    Hemoglobin 13.4 (L) 13.5  - 17.5 g/dL    Hematocrit 43.5 41.0 - 52.0 %    MCV 90 80 - 100 fL    MCH 27.6 26.0 - 34.0 pg    MCHC 30.8 (L) 32.0 - 36.0 g/dL    RDW 14.3 11.5 - 14.5 %    Platelets 217 150 - 450 x10*3/uL    Neutrophils % 80.8 40.0 - 80.0 %    Immature Granulocytes %, Automated 0.5 0.0 - 0.9 %    Lymphocytes % 6.0 13.0 - 44.0 %    Monocytes % 8.9 2.0 - 10.0 %    Eosinophils % 3.3 0.0 - 6.0 %    Basophils % 0.5 0.0 - 2.0 %    Neutrophils Absolute 4.42 1.20 - 7.70 x10*3/uL    Immature Granulocytes Absolute, Automated 0.03 0.00 - 0.70 x10*3/uL    Lymphocytes Absolute 0.33 (L) 1.20 - 4.80 x10*3/uL    Monocytes Absolute 0.49 0.10 - 1.00 x10*3/uL    Eosinophils Absolute 0.18 0.00 - 0.70 x10*3/uL    Basophils Absolute 0.03 0.00 - 0.10 x10*3/uL   Renal Function Panel   Result Value Ref Range    Glucose 135 (H) 74 - 99 mg/dL    Sodium 135 (L) 136 - 145 mmol/L    Potassium 4.3 3.5 - 5.3 mmol/L    Chloride 102 98 - 107 mmol/L    Bicarbonate 19 (L) 21 - 32 mmol/L    Anion Gap 18 10 - 20 mmol/L    Urea Nitrogen 14 6 - 23 mg/dL    Creatinine 0.71 0.50 - 1.30 mg/dL    eGFR >90 >60 mL/min/1.73m*2    Calcium 9.1 8.6 - 10.6 mg/dL    Phosphorus 3.4 2.5 - 4.9 mg/dL    Albumin 3.5 3.4 - 5.0 g/dL   Magnesium   Result Value Ref Range    Magnesium 2.11 1.60 - 2.40 mg/dL   POCT GLUCOSE   Result Value Ref Range    POCT Glucose 142 (H) 74 - 99 mg/dL      XR tibia fibula bilateral 2 views: Result Date: 2/1/2024     There is generalized soft tissue edema of the right left leg the right and left leg, as well as ulcerations of the distal right left leg and dorsal left midfoot and forefoot. Findings are related in part to venous stasis edema given the presence of phleboliths bilaterally. No evidence of osteomyelitis of the proximal tibia or fibula on the right or left. Aside from the ulcerations, no evidence of soft tissue gas to suggest necrotizing infection.     XR ankle left 3+ views: Result Date: 2/1/2024     LEFT ANKLE: 1. Soft tissue ulcerations of the  anterolateral distal leg and dorsal midfoot/forefoot without radiographic evidence of acute osteomyelitis. 2. Chronic periosteal reaction of the distal tibia and fibula likely related to venous stasis given extensive phleboliths. 3. Status post 4th-5th transmetatarsal amputations and 3rd toe amputation at the MTP joint. 4. Multiple additional potential etiologies of ankle/foot pain as described in detail above, depending on location and clinical context. 5. No acute fracture or malalignment.        RIGHT ANKLE: 1. Large soft tissue ulceration of the medial distal right leg without radiographic evidence of acute osteomyelitis. 2. Diffuse chronic periosteal new bone formation of the distal tibia and fibula related to venous stasis edema given phleboliths. 3. Advanced Charcot arthropathy of the right midfoot with collapse of the talonavicular and calcaneocuboid joints, and chronic fragmentation, ankylosis, and prominent plantar osseous protuberance arising from the midfoot that can predispose to pressure-related ulceration and osteomyelitis with continued weight-bearing. 4. Multiple potential etiologies of ankle/foot pain as described in detail above, depending on location and clinical context.        Aortogram with bilateral lower extremity runoff ([facility] 1/12)  IMPRESSION:    1.         65% right renal artery stenosis.  2.         Apparent left renal artery stenosis.  3.         70% mid infrarenal aortic stenosis.  4.         Patent right common iliac artery and bilateral external iliac artery stents.    5.         Occluded right internal iliac artery.  6.         Severe stenosis at the origin of the left internal iliac artery.  7.         Moderate left common femoral artery plaque disease.  8.         Occluded left superficial femoral artery.  9.         Reconstitution of a diseased left above-knee popliteal artery.  10.       Mild disease involving the left below-knee popliteal artery and 1 vessel peroneal runoff to  the left foot.    11.       Left peroneal collaterals distally reconstituted the posterior tibial and dorsalis pedis arteries in the left foot.    12.       Severe right common femoral artery plaque disease.  13.       Right superficial femoral artery occlusion.  14.       Right deep femoral collaterals reconstituted diseased right above-knee popliteal artery.    15.       Mild right below-knee popliteal artery disease and 1 vessel peroneal runoff to the right foot.    16.       Moderate right tibioperoneal trunk stenosis.  17.       Right peroneal artery reconstitutes the posterior tibial and dorsalis pedis arteries in the right foot.             Assessment/Plan   Principal Problem:    Wound infection    53 year old male with a history of type 2 diabetes mellitus, hypertension, hyperlipidemia, coronary artery disease status post CABGx3 3/22, history of congestive heart failure, morbid obesity, chronic venous stasis dermatitis, and long-standing and ongoing tobacco use, who has peripheral arterial occlusive disease and has non-healing wounds in both lower legs for 1.5 yrs.  He previously has undergone a left common iliac and  bilateral external iliac artery stenting as well as left SFA and popliteal angioplasties in 2017, in addition to some toe amputations at that time in his left foot.  He now presents with tissue loss in both lower legs with worsening ulcerations involving his right medial lower leg, left medial foot and left anterolateral lower leg.  He is admitted for concern for infection given extensive malodorous drainage; as well as long term planning with podiatry and vascular surgery.      #Bilateral lower extremity wounds, acute on chronic   - wound culture 1/30 podiatry clinic: 2+ few polymorphonuclear leukocytes, moderate mixed gram positive and gram negative bacteria  - blood culture x2 1/31  - vascular surgery consult in AM (PT 1+ present bilaterally on exam)- Recs :   - EDIN/PVR  - Vein mapping  -  Further operative plans to be discussed following results of these studies    - podiatry consult for skin graft (patient previously refused)  Recs:    - High risk of losing left leg to proximal amputation. Likely non-salvageable left foot and leg. Extensive wounds and extended period of time of wounds and history of severe PAD, likely not able to salvage. Patient does not want multiple procedures and amputations. Fed up with left chronic issues, was open to idea of proximal amputation.   - Right still high risk, but possible salvageability if inflow is corrected. Charcot ankle midfoot collapse will add to complexity of salvageability and rehab.   - No surgical intervention from podiatry standpoint until vascular needs are met.  - Podiatry to continue following while in house      -Endovascular was consulted Recs:   - Patient will likely need multiple intervention/surgery starting from Most proximal disease (aorta), followed by distal disease.  - Patient was given realistic expectation of really complex and prolonged course and we might still not able avoid some level of amputation.   - No intervention from EVLS at this time.     -Continue vanc/zosyn (1/31-   - Xrays ordered for evaluation of osteomyelitis/gas- if +, will add clindamycin for coverage  -provide resources to stop smoking  - nutrition consult to help with wound healing  - will follow up recs from podiatry and vascular surgery     #T2DM  -holding metformin, glipizide, ozempic  -mild SSI      #HDL  #CAD status post CABG X3  -continue atorvastatin     #HTN  #CHF  -continue losartan   -continue jardiance     #PAD  #chronic venous stasis dermatitis s/p left common iliac, bilateral external iliac artery stenting, left SFA and popliteal angioplasties  #left toe amputation   -continue aspirin 81mg BID     F: None  E: Replete PRN  N: Carb controlled diet  GI ppx: None  DVT ppx: Lovenox BID (dosing for weight)  Code: FULL  NOK: Laura (sister in law) or Mother  [phone]           Nakul Rios MD

## 2024-02-02 NOTE — CARE PLAN
The patient's goals for the shift include  Free from pain    The clinical goals for the shift include Patient will remain safe during shift    Over the shift, the patient did not make progress toward the following goals. Barriers to progression include Weak gait. Recommendations to address these barriers include bed alarm and falls precautions.

## 2024-02-03 LAB
ALBUMIN SERPL BCP-MCNC: 3 G/DL (ref 3.4–5)
ANION GAP SERPL CALC-SCNC: 14 MMOL/L (ref 10–20)
BASOPHILS # BLD AUTO: 0.02 X10*3/UL (ref 0–0.1)
BASOPHILS NFR BLD AUTO: 0.5 %
BUN SERPL-MCNC: 15 MG/DL (ref 6–23)
CALCIUM SERPL-MCNC: 8.3 MG/DL (ref 8.6–10.6)
CHLORIDE SERPL-SCNC: 101 MMOL/L (ref 98–107)
CO2 SERPL-SCNC: 24 MMOL/L (ref 21–32)
CREAT SERPL-MCNC: 0.75 MG/DL (ref 0.5–1.3)
EGFRCR SERPLBLD CKD-EPI 2021: >90 ML/MIN/1.73M*2
EOSINOPHIL # BLD AUTO: 0.19 X10*3/UL (ref 0–0.7)
EOSINOPHIL NFR BLD AUTO: 5.1 %
ERYTHROCYTE [DISTWIDTH] IN BLOOD BY AUTOMATED COUNT: 14.1 % (ref 11.5–14.5)
GLUCOSE BLD MANUAL STRIP-MCNC: 126 MG/DL (ref 74–99)
GLUCOSE BLD MANUAL STRIP-MCNC: 179 MG/DL (ref 74–99)
GLUCOSE BLD MANUAL STRIP-MCNC: 182 MG/DL (ref 74–99)
GLUCOSE BLD MANUAL STRIP-MCNC: 212 MG/DL (ref 74–99)
GLUCOSE SERPL-MCNC: 125 MG/DL (ref 74–99)
HCT VFR BLD AUTO: 39.7 % (ref 41–52)
HGB BLD-MCNC: 12.7 G/DL (ref 13.5–17.5)
IMM GRANULOCYTES # BLD AUTO: 0.01 X10*3/UL (ref 0–0.7)
IMM GRANULOCYTES NFR BLD AUTO: 0.3 % (ref 0–0.9)
LYMPHOCYTES # BLD AUTO: 0.5 X10*3/UL (ref 1.2–4.8)
LYMPHOCYTES NFR BLD AUTO: 13.3 %
MAGNESIUM SERPL-MCNC: 1.91 MG/DL (ref 1.6–2.4)
MCH RBC QN AUTO: 27.9 PG (ref 26–34)
MCHC RBC AUTO-ENTMCNC: 32 G/DL (ref 32–36)
MCV RBC AUTO: 87 FL (ref 80–100)
MONOCYTES # BLD AUTO: 0.53 X10*3/UL (ref 0.1–1)
MONOCYTES NFR BLD AUTO: 14.1 %
NEUTROPHILS # BLD AUTO: 2.5 X10*3/UL (ref 1.2–7.7)
NEUTROPHILS NFR BLD AUTO: 66.7 %
NRBC BLD-RTO: 0 /100 WBCS (ref 0–0)
PHOSPHATE SERPL-MCNC: 3.3 MG/DL (ref 2.5–4.9)
PLATELET # BLD AUTO: 210 X10*3/UL (ref 150–450)
POTASSIUM SERPL-SCNC: 4.1 MMOL/L (ref 3.5–5.3)
RBC # BLD AUTO: 4.56 X10*6/UL (ref 4.5–5.9)
SODIUM SERPL-SCNC: 135 MMOL/L (ref 136–145)
VANCOMYCIN TROUGH SERPL-MCNC: 16.3 UG/ML (ref 5–20)
WBC # BLD AUTO: 3.8 X10*3/UL (ref 4.4–11.3)

## 2024-02-03 PROCEDURE — A4217 STERILE WATER/SALINE, 500 ML: HCPCS

## 2024-02-03 PROCEDURE — 2500000004 HC RX 250 GENERAL PHARMACY W/ HCPCS (ALT 636 FOR OP/ED): Performed by: STUDENT IN AN ORGANIZED HEALTH CARE EDUCATION/TRAINING PROGRAM

## 2024-02-03 PROCEDURE — 83735 ASSAY OF MAGNESIUM: CPT | Mod: PARLAB

## 2024-02-03 PROCEDURE — 85025 COMPLETE CBC W/AUTO DIFF WBC: CPT | Mod: PARLAB

## 2024-02-03 PROCEDURE — 80202 ASSAY OF VANCOMYCIN: CPT | Mod: PARLAB | Performed by: STUDENT IN AN ORGANIZED HEALTH CARE EDUCATION/TRAINING PROGRAM

## 2024-02-03 PROCEDURE — 2500000001 HC RX 250 WO HCPCS SELF ADMINISTERED DRUGS (ALT 637 FOR MEDICARE OP): Performed by: STUDENT IN AN ORGANIZED HEALTH CARE EDUCATION/TRAINING PROGRAM

## 2024-02-03 PROCEDURE — 80069 RENAL FUNCTION PANEL: CPT | Mod: PARLAB

## 2024-02-03 PROCEDURE — 99232 SBSQ HOSP IP/OBS MODERATE 35: CPT

## 2024-02-03 PROCEDURE — 36415 COLL VENOUS BLD VENIPUNCTURE: CPT | Mod: PARLAB

## 2024-02-03 PROCEDURE — 82947 ASSAY GLUCOSE BLOOD QUANT: CPT

## 2024-02-03 PROCEDURE — 2500000004 HC RX 250 GENERAL PHARMACY W/ HCPCS (ALT 636 FOR OP/ED)

## 2024-02-03 PROCEDURE — 2500000001 HC RX 250 WO HCPCS SELF ADMINISTERED DRUGS (ALT 637 FOR MEDICARE OP)

## 2024-02-03 PROCEDURE — 1100000001 HC PRIVATE ROOM DAILY

## 2024-02-03 RX ORDER — POLYETHYLENE GLYCOL 3350 17 G/17G
17 POWDER, FOR SOLUTION ORAL 2 TIMES WEEKLY
Status: DISCONTINUED | OUTPATIENT
Start: 2024-02-05 | End: 2024-02-04 | Stop reason: HOSPADM

## 2024-02-03 RX ORDER — SENNOSIDES 8.6 MG/1
1 TABLET ORAL NIGHTLY
Status: DISCONTINUED | OUTPATIENT
Start: 2024-02-03 | End: 2024-02-04 | Stop reason: HOSPADM

## 2024-02-03 RX ADMIN — ACETAMINOPHEN 650 MG: 325 TABLET ORAL at 18:44

## 2024-02-03 RX ADMIN — OXYCODONE HYDROCHLORIDE 5 MG: 5 TABLET ORAL at 18:44

## 2024-02-03 RX ADMIN — PIPERACILLIN SODIUM AND TAZOBACTAM SODIUM 3.38 G: 3; .375 INJECTION, SOLUTION INTRAVENOUS at 18:44

## 2024-02-03 RX ADMIN — VANCOMYCIN HYDROCHLORIDE 1250 MG: 5 INJECTION, POWDER, LYOPHILIZED, FOR SOLUTION INTRAVENOUS at 11:47

## 2024-02-03 RX ADMIN — INSULIN LISPRO 1 UNITS: 100 INJECTION, SOLUTION INTRAVENOUS; SUBCUTANEOUS at 13:23

## 2024-02-03 RX ADMIN — PIPERACILLIN SODIUM AND TAZOBACTAM SODIUM 3.38 G: 3; .375 INJECTION, SOLUTION INTRAVENOUS at 06:49

## 2024-02-03 RX ADMIN — INSULIN LISPRO 1 UNITS: 100 INJECTION, SOLUTION INTRAVENOUS; SUBCUTANEOUS at 17:40

## 2024-02-03 RX ADMIN — SENNOSIDES 8.6 MG: 8.6 TABLET, FILM COATED ORAL at 21:00

## 2024-02-03 RX ADMIN — LOSARTAN POTASSIUM 50 MG: 50 TABLET, FILM COATED ORAL at 08:36

## 2024-02-03 RX ADMIN — PIPERACILLIN SODIUM AND TAZOBACTAM SODIUM 3.38 G: 3; .375 INJECTION, SOLUTION INTRAVENOUS at 14:00

## 2024-02-03 RX ADMIN — OXYCODONE HYDROCHLORIDE 5 MG: 5 TABLET ORAL at 05:48

## 2024-02-03 RX ADMIN — ENOXAPARIN SODIUM 40 MG: 100 INJECTION SUBCUTANEOUS at 20:59

## 2024-02-03 RX ADMIN — ASPIRIN 81 MG: 81 TABLET, COATED ORAL at 08:36

## 2024-02-03 RX ADMIN — ENOXAPARIN SODIUM 40 MG: 100 INJECTION SUBCUTANEOUS at 08:36

## 2024-02-03 RX ADMIN — EMPAGLIFLOZIN 25 MG: 25 TABLET, FILM COATED ORAL at 08:36

## 2024-02-03 RX ADMIN — PIPERACILLIN SODIUM AND TAZOBACTAM SODIUM 3.38 G: 3; .375 INJECTION, SOLUTION INTRAVENOUS at 00:34

## 2024-02-03 RX ADMIN — MELATONIN 6 MG: 3 TAB ORAL at 21:00

## 2024-02-03 RX ADMIN — ATORVASTATIN CALCIUM 40 MG: 40 TABLET, FILM COATED ORAL at 08:36

## 2024-02-03 ASSESSMENT — PAIN - FUNCTIONAL ASSESSMENT
PAIN_FUNCTIONAL_ASSESSMENT: 0-10

## 2024-02-03 ASSESSMENT — PAIN SCALES - GENERAL
PAINLEVEL_OUTOF10: 7
PAINLEVEL_OUTOF10: 4
PAINLEVEL_OUTOF10: 8
PAINLEVEL_OUTOF10: 8

## 2024-02-03 ASSESSMENT — PAIN DESCRIPTION - LOCATION: LOCATION: LEG

## 2024-02-03 NOTE — CARE PLAN
The patient's goals for the shift include  Free from pain    The clinical goals for the shift include Free from pain    Over the shift, the patient did not make progress toward the following goals. Barriers to progression include Leg ulcers. Recommendations to address these barriers include pain medications.

## 2024-02-03 NOTE — PROGRESS NOTES
"Vancomycin Dosing by Pharmacy- FOLLOW UP    Tim Stokes is a 53 y.o. year old male who Pharmacy has been consulted for vancomycin dosing for cellulitis, skin and soft tissue. Based on the patient's indication and renal status this patient is being dosed based on a goal AUC of 400-600.     Renal function is currently stable.    Current vancomycin dose: 1500 mg given every 12 hours    Estimated vancomycin AUC on current dose: 563 mg/L.hr     Visit Vitals  /76 (BP Location: Right arm, Patient Position: Lying)   Pulse 65   Temp 36.5 °C (97.7 °F) (Temporal)   Resp 16        Lab Results   Component Value Date    CREATININE 0.75 02/03/2024    CREATININE 0.71 02/02/2024    CREATININE 0.68 02/01/2024    CREATININE 0.66 01/31/2024        Patient weight is No results found for: \"PTWEIGHT\"    No results found for: \"CULTURE\"     I/O last 3 completed shifts:  In: - (0 mL/kg)   Out: 1000 (7.5 mL/kg) [Urine:1000 (0.2 mL/kg/hr)]  Weight: 132.9 kg   [unfilled]    No results found for: \"PATIENTTEMP\"     Assessment/Plan    Increase in PAUC on 1.5g q12 dosing to 563. For skin infection on the higher end. Will reduce dose to 1.25g q12 for pAUC of 469 (therapeutic and less chance of kidney damage)    This dosing regimen is predicted by [employer] to result in the following pharmacokinetic parameters:  Loading dose: N/A  Regimen: 1250 mg IV every 12 hours.  Start time: 10:52 on 02/03/2024  Exposure target: AUC24 (range)400-600 mg/L.hr   AUC24,ss: 469 mg/L.hr  Probability of AUC24 > 400: 86 %  Ctrough,ss: 12.4 mg/L  Probability of Ctrough,ss > 20: 0 %  Probability of nephrotoxicity (Lodise NAHUM 2009): 8 %    The next level will be obtained on 2/5 at AM. May be obtained sooner if clinically indicated.   Will continue to monitor renal function daily while on vancomycin and order serum creatinine at least every 48 hours if not already ordered.  Follow for continued vancomycin needs, clinical response, and signs/symptoms of toxicity. "       Bailey Godfrey, PharmD

## 2024-02-03 NOTE — PROGRESS NOTES
Tim Stokes is a 53 y.o. male on day 2 of admission presenting with Wound infection.    Subjective   Patient doing okay this morning without new complaints. Is frustrated regarding the lack of clarity regarding his plan .     Objective     Physical Exam  Constitutional:       Appearance: He is obese.   HENT:      Head: Normocephalic and atraumatic.      Mouth/Throat:      Mouth: Mucous membranes are moist.   Eyes:      Extraocular Movements: Extraocular movements intact.      Pupils: Pupils are equal, round, and reactive to light.   Cardiovascular:      Pulses: Normal pulses.      Heart sounds: No murmur heard.     Comments: 1+ PT pulses bilaterally  Pulmonary:      Effort: Pulmonary effort is normal.      Breath sounds: Normal breath sounds.   Abdominal:      General: Bowel sounds are normal.      Palpations: Abdomen is soft.   Musculoskeletal:      Comments: R leg appears bigger chronically since procedure; but not edematous.    Skin:     Comments: deep, pustular, erythematous skin around bilateral lower extremities   Neurological:      Mental Status: He is alert.      Comments: Full range of motion, normal intact sensation in BUE and BLE     Last Recorded Vitals  Blood pressure 136/81, pulse 70, temperature 36.3 °C (97.3 °F), temperature source Temporal, resp. rate 16, weight 133 kg (293 lb), SpO2 98 %.  Intake/Output last 3 Shifts:  I/O last 3 completed shifts:  In: - (0 mL/kg)   Out: 1000 (7.5 mL/kg) [Urine:1000 (0.2 mL/kg/hr)]  Weight: 132.9 kg     Relevant Results              Results for orders placed or performed during the hospital encounter of 01/31/24 (from the past 24 hour(s))   POCT GLUCOSE   Result Value Ref Range    POCT Glucose 153 (H) 74 - 99 mg/dL   POCT GLUCOSE   Result Value Ref Range    POCT Glucose 214 (H) 74 - 99 mg/dL   POCT GLUCOSE   Result Value Ref Range    POCT Glucose 191 (H) 74 - 99 mg/dL      XR tibia fibula bilateral 2 views: Result Date: 2/1/2024     There is generalized soft  tissue edema of the right left leg the right and left leg, as well as ulcerations of the distal right left leg and dorsal left midfoot and forefoot. Findings are related in part to venous stasis edema given the presence of phleboliths bilaterally. No evidence of osteomyelitis of the proximal tibia or fibula on the right or left. Aside from the ulcerations, no evidence of soft tissue gas to suggest necrotizing infection.     XR ankle left 3+ views: Result Date: 2/1/2024     LEFT ANKLE: 1. Soft tissue ulcerations of the anterolateral distal leg and dorsal midfoot/forefoot without radiographic evidence of acute osteomyelitis. 2. Chronic periosteal reaction of the distal tibia and fibula likely related to venous stasis given extensive phleboliths. 3. Status post 4th-5th transmetatarsal amputations and 3rd toe amputation at the MTP joint. 4. Multiple additional potential etiologies of ankle/foot pain as described in detail above, depending on location and clinical context. 5. No acute fracture or malalignment.        RIGHT ANKLE: 1. Large soft tissue ulceration of the medial distal right leg without radiographic evidence of acute osteomyelitis. 2. Diffuse chronic periosteal new bone formation of the distal tibia and fibula related to venous stasis edema given phleboliths. 3. Advanced Charcot arthropathy of the right midfoot with collapse of the talonavicular and calcaneocuboid joints, and chronic fragmentation, ankylosis, and prominent plantar osseous protuberance arising from the midfoot that can predispose to pressure-related ulceration and osteomyelitis with continued weight-bearing. 4. Multiple potential etiologies of ankle/foot pain as described in detail above, depending on location and clinical context.        Aortogram with bilateral lower extremity runoff ([facility] 1/12)  IMPRESSION:    1.         65% right renal artery stenosis.  2.         Apparent left renal artery stenosis.  3.         70% mid infrarenal aortic  stenosis.  4.         Patent right common iliac artery and bilateral external iliac artery stents.    5.         Occluded right internal iliac artery.  6.         Severe stenosis at the origin of the left internal iliac artery.  7.         Moderate left common femoral artery plaque disease.  8.         Occluded left superficial femoral artery.  9.         Reconstitution of a diseased left above-knee popliteal artery.  10.       Mild disease involving the left below-knee popliteal artery and 1 vessel peroneal runoff to the left foot.    11.       Left peroneal collaterals distally reconstituted the posterior tibial and dorsalis pedis arteries in the left foot.    12.       Severe right common femoral artery plaque disease.  13.       Right superficial femoral artery occlusion.  14.       Right deep femoral collaterals reconstituted diseased right above-knee popliteal artery.    15.       Mild right below-knee popliteal artery disease and 1 vessel peroneal runoff to the right foot.    16.       Moderate right tibioperoneal trunk stenosis.  17.       Right peroneal artery reconstitutes the posterior tibial and dorsalis pedis arteries in the right foot.             Assessment/Plan   Principal Problem:    Wound infection    53 year old male with a history of type 2 diabetes mellitus, hypertension, hyperlipidemia, coronary artery disease status post CABGx3 3/22, history of congestive heart failure, morbid obesity, chronic venous stasis dermatitis, and long-standing and ongoing tobacco use, who has peripheral arterial occlusive disease and has non-healing wounds in both lower legs for 1.5 yrs.  He previously has undergone a left common iliac and  bilateral external iliac artery stenting as well as left SFA and popliteal angioplasties in 2017, in addition to some toe amputations at that time in his left foot.  He now presents with tissue loss in both lower legs with worsening ulcerations involving his right medial lower  leg, left medial foot and left anterolateral lower leg.  He is admitted for concern for infection given extensive malodorous drainage; as well as long term planning with podiatry and vascular surgery.      Updates 2/3 :  - Pending Lower extremity US.      #Bilateral lower extremity wounds, acute on chronic   - wound culture 1/30 podiatry clinic: 2+ few polymorphonuclear leukocytes, moderate mixed gram positive and gram negative bacteria  - blood culture x2 1/31  - vascular surgery consult in AM (PT 1+ present bilaterally on exam)- Recs :   - EDIN/PVR  - Vein mapping  - Further operative plans to be discussed following results of these studies    - podiatry consult for skin graft (patient previously refused)  Recs:    - High risk of losing left leg to proximal amputation. Likely non-salvageable left foot and leg. Extensive wounds and extended period of time of wounds and history of severe PAD, likely not able to salvage. Patient does not want multiple procedures and amputations. Fed up with left chronic issues, was open to idea of proximal amputation.   - Right still high risk, but possible salvageability if inflow is corrected. Charcot ankle midfoot collapse will add to complexity of salvageability and rehab.   - No surgical intervention from podiatry standpoint until vascular needs are met.  - Podiatry to continue following while in house      -Endovascular was consulted Recs:   - Patient will likely need multiple intervention/surgery starting from Most proximal disease (aorta), followed by distal disease.  - Patient was given realistic expectation of really complex and prolonged course and we might still not able avoid some level of amputation.   - No intervention from EVLS at this time.     -Continue vanc/zosyn (1/31-   - Xrays ordered for evaluation of osteomyelitis/gas- if +, will add clindamycin for coverage  -provide resources to stop smoking  - nutrition consult to help with wound healing  - will follow up  recs from podiatry and vascular surgery     #T2DM  -holding metformin, glipizide, ozempic  -mild SSI      #HDL  #CAD status post CABG X3  -continue atorvastatin     #HTN  #CHF  -continue losartan   -continue jardiance     #PAD  #chronic venous stasis dermatitis s/p left common iliac, bilateral external iliac artery stenting, left SFA and popliteal angioplasties  #left toe amputation   -continue aspirin 81mg BID     F: None  E: Replete PRN  N: Carb controlled diet  GI ppx: None  DVT ppx: Lovenox BID (dosing for weight)  Code: FULL  NOK: Laura (sister in law) or Mother [phone]           Nakul Rios MD

## 2024-02-04 VITALS
WEIGHT: 293 LBS | BODY MASS INDEX: 40.87 KG/M2 | RESPIRATION RATE: 18 BRPM | SYSTOLIC BLOOD PRESSURE: 117 MMHG | OXYGEN SATURATION: 98 % | HEART RATE: 69 BPM | TEMPERATURE: 98.2 F | DIASTOLIC BLOOD PRESSURE: 75 MMHG

## 2024-02-04 LAB
ALBUMIN SERPL BCP-MCNC: 2.9 G/DL (ref 3.4–5)
ANION GAP SERPL CALC-SCNC: 13 MMOL/L (ref 10–20)
BACTERIA BLD CULT: NORMAL
BACTERIA BLD CULT: NORMAL
BASOPHILS # BLD AUTO: 0.03 X10*3/UL (ref 0–0.1)
BASOPHILS NFR BLD AUTO: 0.7 %
BUN SERPL-MCNC: 19 MG/DL (ref 6–23)
CALCIUM SERPL-MCNC: 8.4 MG/DL (ref 8.6–10.6)
CHLORIDE SERPL-SCNC: 102 MMOL/L (ref 98–107)
CO2 SERPL-SCNC: 23 MMOL/L (ref 21–32)
CREAT SERPL-MCNC: 0.93 MG/DL (ref 0.5–1.3)
EGFRCR SERPLBLD CKD-EPI 2021: >90 ML/MIN/1.73M*2
EOSINOPHIL # BLD AUTO: 0.25 X10*3/UL (ref 0–0.7)
EOSINOPHIL NFR BLD AUTO: 5.6 %
ERYTHROCYTE [DISTWIDTH] IN BLOOD BY AUTOMATED COUNT: 14.3 % (ref 11.5–14.5)
GLUCOSE BLD MANUAL STRIP-MCNC: 129 MG/DL (ref 74–99)
GLUCOSE BLD MANUAL STRIP-MCNC: 170 MG/DL (ref 74–99)
GLUCOSE SERPL-MCNC: 139 MG/DL (ref 74–99)
HCT VFR BLD AUTO: 39.2 % (ref 41–52)
HGB BLD-MCNC: 12.8 G/DL (ref 13.5–17.5)
IMM GRANULOCYTES # BLD AUTO: 0.02 X10*3/UL (ref 0–0.7)
IMM GRANULOCYTES NFR BLD AUTO: 0.4 % (ref 0–0.9)
LYMPHOCYTES # BLD AUTO: 0.75 X10*3/UL (ref 1.2–4.8)
LYMPHOCYTES NFR BLD AUTO: 16.9 %
MAGNESIUM SERPL-MCNC: 2.07 MG/DL (ref 1.6–2.4)
MCH RBC QN AUTO: 29 PG (ref 26–34)
MCHC RBC AUTO-ENTMCNC: 32.7 G/DL (ref 32–36)
MCV RBC AUTO: 89 FL (ref 80–100)
MONOCYTES # BLD AUTO: 0.71 X10*3/UL (ref 0.1–1)
MONOCYTES NFR BLD AUTO: 16 %
NEUTROPHILS # BLD AUTO: 2.69 X10*3/UL (ref 1.2–7.7)
NEUTROPHILS NFR BLD AUTO: 60.4 %
NRBC BLD-RTO: 0 /100 WBCS (ref 0–0)
PHOSPHATE SERPL-MCNC: 3.5 MG/DL (ref 2.5–4.9)
PLATELET # BLD AUTO: 217 X10*3/UL (ref 150–450)
POTASSIUM SERPL-SCNC: 4.3 MMOL/L (ref 3.5–5.3)
RBC # BLD AUTO: 4.42 X10*6/UL (ref 4.5–5.9)
SODIUM SERPL-SCNC: 134 MMOL/L (ref 136–145)
WBC # BLD AUTO: 4.5 X10*3/UL (ref 4.4–11.3)

## 2024-02-04 PROCEDURE — 82947 ASSAY GLUCOSE BLOOD QUANT: CPT

## 2024-02-04 PROCEDURE — 2500000001 HC RX 250 WO HCPCS SELF ADMINISTERED DRUGS (ALT 637 FOR MEDICARE OP): Performed by: STUDENT IN AN ORGANIZED HEALTH CARE EDUCATION/TRAINING PROGRAM

## 2024-02-04 PROCEDURE — A4217 STERILE WATER/SALINE, 500 ML: HCPCS

## 2024-02-04 PROCEDURE — 2500000004 HC RX 250 GENERAL PHARMACY W/ HCPCS (ALT 636 FOR OP/ED): Performed by: STUDENT IN AN ORGANIZED HEALTH CARE EDUCATION/TRAINING PROGRAM

## 2024-02-04 PROCEDURE — 83735 ASSAY OF MAGNESIUM: CPT | Mod: PARLAB

## 2024-02-04 PROCEDURE — 80069 RENAL FUNCTION PANEL: CPT | Mod: PARLAB

## 2024-02-04 PROCEDURE — 99239 HOSP IP/OBS DSCHRG MGMT >30: CPT | Performed by: INTERNAL MEDICINE

## 2024-02-04 PROCEDURE — 85025 COMPLETE CBC W/AUTO DIFF WBC: CPT | Mod: PARLAB

## 2024-02-04 PROCEDURE — 99024 POSTOP FOLLOW-UP VISIT: CPT | Performed by: SURGERY

## 2024-02-04 PROCEDURE — 36415 COLL VENOUS BLD VENIPUNCTURE: CPT | Mod: PARLAB

## 2024-02-04 PROCEDURE — 2500000004 HC RX 250 GENERAL PHARMACY W/ HCPCS (ALT 636 FOR OP/ED)

## 2024-02-04 RX ORDER — AMOXICILLIN AND CLAVULANATE POTASSIUM 875; 125 MG/1; MG/1
1 TABLET, FILM COATED ORAL 2 TIMES DAILY
Qty: 14 TABLET | Refills: 0 | Status: ON HOLD | OUTPATIENT
Start: 2024-02-04 | End: 2024-02-13

## 2024-02-04 RX ADMIN — PIPERACILLIN SODIUM AND TAZOBACTAM SODIUM 3.38 G: 3; .375 INJECTION, SOLUTION INTRAVENOUS at 01:57

## 2024-02-04 RX ADMIN — LOSARTAN POTASSIUM 50 MG: 50 TABLET, FILM COATED ORAL at 10:13

## 2024-02-04 RX ADMIN — VANCOMYCIN HYDROCHLORIDE 1250 MG: 5 INJECTION, POWDER, LYOPHILIZED, FOR SOLUTION INTRAVENOUS at 00:36

## 2024-02-04 RX ADMIN — ENOXAPARIN SODIUM 40 MG: 100 INJECTION SUBCUTANEOUS at 10:13

## 2024-02-04 RX ADMIN — ASPIRIN 81 MG: 81 TABLET, COATED ORAL at 10:13

## 2024-02-04 RX ADMIN — OXYCODONE HYDROCHLORIDE 5 MG: 5 TABLET ORAL at 02:46

## 2024-02-04 RX ADMIN — PIPERACILLIN SODIUM AND TAZOBACTAM SODIUM 3.38 G: 3; .375 INJECTION, SOLUTION INTRAVENOUS at 12:36

## 2024-02-04 RX ADMIN — PIPERACILLIN SODIUM AND TAZOBACTAM SODIUM 3.38 G: 3; .375 INJECTION, SOLUTION INTRAVENOUS at 06:08

## 2024-02-04 RX ADMIN — ATORVASTATIN CALCIUM 40 MG: 40 TABLET, FILM COATED ORAL at 10:13

## 2024-02-04 RX ADMIN — INSULIN LISPRO 1 UNITS: 100 INJECTION, SOLUTION INTRAVENOUS; SUBCUTANEOUS at 12:32

## 2024-02-04 RX ADMIN — EMPAGLIFLOZIN 25 MG: 25 TABLET, FILM COATED ORAL at 10:13

## 2024-02-04 RX ADMIN — VANCOMYCIN HYDROCHLORIDE 1250 MG: 5 INJECTION, POWDER, LYOPHILIZED, FOR SOLUTION INTRAVENOUS at 10:14

## 2024-02-04 ASSESSMENT — PAIN - FUNCTIONAL ASSESSMENT
PAIN_FUNCTIONAL_ASSESSMENT: 0-10
PAIN_FUNCTIONAL_ASSESSMENT: 0-10

## 2024-02-04 ASSESSMENT — PAIN SCALES - GENERAL
PAINLEVEL_OUTOF10: 5 - MODERATE PAIN
PAINLEVEL_OUTOF10: 2
PAINLEVEL_OUTOF10: 0 - NO PAIN

## 2024-02-04 NOTE — PROGRESS NOTES
VASCULAR SURGERY PROGRESS NOTE  [facility]  Subjective   Patient seen and examined this morning. Discussed his anatomic and comorbidity considerations when planning for revascularization I.e. open vs endovascular. Answered all questions.     Objective   Heart Rate:  [51-79]   Temp:  [36.4 °C (97.5 °F)-37 °C (98.6 °F)]   Resp:  [16-18]   BP: (113-123)/(68-72)   SpO2:  [96 %-98 %]     Physical Exam:  General: NAD, AAOx3  Neuro: no gross deficits, speech WNL  HEENT: NC/AT  CV: RRR  Pulse exam: unable to identify Doppler signals in either LE, nonpalpable femoral pulses 2/2 body habitus  Pulm: normal respiratory effort on RA  Abd: morbidly obese, soft, NTND, reducible umbilical hernia noted  Extr: FROM x4  Skin: significant wounds to the left worse than right lower extremities     Labs:  Results from last 7 days   Lab Units 02/04/24  0601 02/03/24  0902 02/02/24  0630   WBC AUTO x10*3/uL 4.5 3.8* 5.5   HEMOGLOBIN g/dL 12.8* 12.7* 13.4*   HEMATOCRIT % 39.2* 39.7* 43.5   PLATELETS AUTO x10*3/uL 217 210 217     Results from last 7 days   Lab Units 02/04/24  0601 02/03/24  0902 02/02/24  0630   SODIUM mmol/L 134* 135* 135*   POTASSIUM mmol/L 4.3 4.1 4.3   CHLORIDE mmol/L 102 101 102   CO2 mmol/L 23 24 19*   BUN mg/dL 19 15 14   CREATININE mg/dL 0.93 0.75 0.71   GLUCOSE mg/dL 139* 125* 135*   CALCIUM mg/dL 8.4* 8.3* 9.1             Assessment/Plan   53 y.o. male with significant comorbidities (including PAD) who is admitted for wounds of his BL LE. Has paravisceral aortic atherosclerotic lesions, as well as bilateral SFA occlusions with limited tibial run off into the left foot.   Had discussions with several members of vascular surgery service and endovascular limb salvage service. After discussion with Dr. Godniez, at this time, patient does not have a great option for treatment of his aortic lesions, as open surgery would likely prove complicated and highly risky.   Dr. Boone is involved with  the planning and will attempt endovascular revascularization on Friday, with discharge in the meantime until procedure.   Patient does still need the noninvasive studies to be performed that have been ordered (EDIN/PVR, vein mapping, and venous duplex), for continued interventional/ surgical planning of all options; will coordinate them being done outpatient.   Please continue treatment of the wounds with local wound care per podiatry recommendations, and antibiotics.     Delmar Peña MD  Vascular Surgery Fellow  Service Pager: [phone]  Available over Epic Chat

## 2024-02-04 NOTE — SIGNIFICANT EVENT
Per discussion with the patient earlier patient stated that he does not need home health care, and that he has his own home health care, after being discharged his nurse has notified us that as he was leaving he mentioned to the nurse that he needs a [facility] home health referral, nurse stated that the patient called his home health care they told him that they need a referral due to him being inpatient here, a referral to [facility] was ordered.

## 2024-02-04 NOTE — DISCHARGE INSTRUCTIONS
Ainsley Mr Stokes,    Hope you are well. You were admitted for for concern for infection given extensive malodorous drainage; as well as long term planning with podiatry and vascular surgery. Was treated on Vanc/zoysn 1/31-2/3 switched to Augmentin on discharge.   Per vascular surgery they don't feel that there is a great option for treatment of his aortic lesions, as open surgery would likely prove complicated and highly risky,Dr. Boone is involved with the planning and will attempt endovascular revascularization on Friday, with discharge in the meantime until procedure.   You would still need the noninvasive studies to be performed that have been ordered (EDIN/PVR, vein mapping, and venous duplex), for continued interventional/ surgical planning of all options; Vascular surgery will coordinate them being done outpatient. We did not provide you with wound care supplies as you had enough wound care supply.     Please follow up:    Please continue to take Augmentin twice daily, for 7 more days   You would still need the noninvasive studies to be performed that have been ordered (EDIN/PVR, vein mapping, and venous duplex), for continued interventional/ surgical planning of all options; Vascular surgery will coordinate them being done outpatient.    Daniel,     Internal medicine team Team     Discharge Meds     Your medication list        START taking these medications        Instructions Last Dose Given Next Dose Due   amoxicillin-pot clavulanate 875-125 mg tablet  Commonly known as: Augmentin      Take 1 tablet (875 mg) by mouth 2 times a day for 7 days.              CONTINUE taking these medications        Instructions Last Dose Given Next Dose Due   aspirin 81 mg EC tablet           atorvastatin 40 mg tablet  Commonly known as: Lipitor           empagliflozin 25 mg  Commonly known as: Jardiance           glipiZIDE 5 mg tablet  Commonly known as: Glucotrol           losartan 50 mg tablet  Commonly known as: Cozaar            metFORMIN 500 mg tablet  Commonly known as: Glucophage           semaglutide 1 mg/dose (4 mg/3 mL) pen injector  Commonly known as: OZEMPIC                  STOP taking these medications      ibuprofen 200 mg tablet                  Where to Get Your Medications        These medications were sent to [id number] - [place], [place] - [address]  [address], [facility] [address]      Phone: [phone]   amoxicillin-pot clavulanate 875-125 mg tablet         Outpatient Follow-Up  No future appointments.

## 2024-02-04 NOTE — PROGRESS NOTES
Tim Stokes is a 53 y.o. male on day 3 of admission presenting with Wound infection.    Subjective   Patient NAD sitting in bed. Frustrated on not having plan. Discharge today, coming back for intervention Friday per EVLS.        Physical Exam    Objective     REVIEW OF SYSTEMS  GENERAL:  Negative for malaise, significant weight loss, fever  HEENT:  No changes in hearing or vision, no nose bleeds or other nasal problems and Negative for frequent or significant headaches  NECK:  Negative for lumps, goiter, pain and significant neck swelling  RESPIRATORY:  Negative for cough, wheezing and shortness of breath  CARDIOVASCULAR:  Poor ciculation  GI:  Negative for abdominal discomfort, blood in stools or black stools and change in bowel habits  :  Negative for dysuria, frequency and incontinence  MUSCULOSKELETAL:  POP.   PSYCH:  Negative for sleep disturbance, mood disorder and recent psychosocial stressors  HEMATOLOGY/LYMPHOLOGY:  Negative for prolonged bleeding, bruising easily, and swollen nodes.  ENDOCRINE:  Negative for cold or heat intolerance, polyuria, polydipsia and goiter  NEURO: Negative, denies any burning, tingling or numbness      Objective:   Vasc: DP and PT pulses are palpable bilateral.  CFT is less than 3 seconds bilateral.  Skin temperature is warm to cool proximal to distal bilateral.  Extensive erythema.     Neuro:  Light touch is intact to the foot bilateral.  Protective sensation is intact to the foot when tested with the 5.07 SWM bilateral.  There is no clonus noted.  The hallux is downgoing bilateral.       Derm: Skin is supple with normal texture and turgor noted.  Right medial calf with extensive fascial and muscle necrosis. Wound edges necrotic, kiley-wound erythema. Malodor noted and moderate drainage. Left leg and foot with extensive wounds with moderate kiley-wound erythema, moderate drainage, 100% fibrotic base.     Ortho: POP and rest pain. S/p 5th ray resection     Last Recorded  Vitals  Blood pressure 113/72, pulse 64, temperature 36.7 °C (98.1 °F), resp. rate 18, weight 133 kg (293 lb), SpO2 98 %.    Intake/Output last 3 Shifts:  I/O last 3 completed shifts:  In: - (0 mL/kg)   Out: 1150 (8.7 mL/kg) [Urine:1150 (0.2 mL/kg/hr)]  Weight: 132.9 kg     Relevant Results  XR negative for OM, cellulitis.      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  - Charts, labs, vitals and imaging all reviewed.   - Imaging: reviewed, soft tissue deficit noted. Right ankle and midfoot collapse consistent with charcot  - Labs: no leukocytosis  - Wound culture: growing mix bacteria  - Abx:  per primary     RECOMMENDATIONS:   - EVLS intervetion friday  - No surgical intervention from podiatry standpoint until vascular needs are met.  - Dressings: Betadine WTD  - Understands dressing changes and is ok with betadine WTD at home for the next week.   - Nursing staff is able to change/reinforce dressing if & as necessary until next day’s dressing change. Thank you.  - OK for discharge today       Case to be discussed with attending, A&P above reflect tentative plan. Please await for final signature from attending physician on service.     Ian Powell DPM PGY-1  Podiatric Medicine & Surgery  Please Epic message with questions

## 2024-02-04 NOTE — DISCHARGE SUMMARY
Discharge Diagnosis  Nonhealing vascular ulcers on bilateral lower extremities (left more than right)   Chronic venous stasis dermatitis  Peripheral arterial disease   Non-insulin-dependent diabetes mellitus  Hypertension  Coronary artery disease status post CABG x 3      Issues Requiring Follow-Up  Please continue to take Augmentin twice daily, for 7 more days   You would still need the noninvasive studies to be performed that have been ordered (EDIN/PVR, vein mapping, and venous duplex), for continued interventional/ surgical planning of all options; Vascular surgery will coordinate them being done outpatient.    Test Results Pending At Discharge  Pending Labs       Order Current Status    Blood Culture Preliminary result    Blood Culture Preliminary result            Hospital Course  53 year old male with a history of type 2 diabetes mellitus, hypertension, hyperlipidemia, coronary artery disease status post CABGx3 3/22, history of congestive heart failure, morbid obesity, chronic venous stasis dermatitis, and long-standing and ongoing tobacco use, who has peripheral arterial occlusive disease and has non-healing wounds in both lower legs for 1.5 yrs.  He previously has undergone a left common iliac and  bilateral external iliac artery stenting as well as left SFA and popliteal angioplasties in 2017, in addition to some toe amputations at that time in his left foot.  He now presents with tissue loss in both lower legs with worsening ulcerations involving his right medial lower leg, left medial foot and left anterolateral lower leg.  He is admitted for concern for infection given extensive malodorous drainage; as well as long term planning with podiatry and vascular surgery.   Was treated on Vanc/zoysn 1/31-2/3 switched to Augmentin on discharge.   Since admission, vascular sugery, podiatry, and wound care have been consulted for consideration of bypass vs amputation.  Per Vascular they had discussions with several  members of vascular surgery service and endovascular limb salvage service. After discussion with Dr. Godinez, at this time, patient does not have a great option for treatment of his aortic lesions, as open surgery would likely prove complicated and highly risky. Dr. Boone is involved with the planning and will attempt endovascular revascularization on Friday, with discharge in the meantime until procedure.   Patient does still need the noninvasive studies to be performed that have been ordered (EDIN/PVR, vein mapping, and venous duplex), for continued interventional/ surgical planning of all options; Vascular surgery will coordinate them being done outpatient.       Pertinent Physical Exam At Time of Discharge  Physical Exam  Constitutional:       Appearance: He is obese.   HENT:      Head: Normocephalic and atraumatic.      Mouth/Throat:      Mouth: Mucous membranes are moist.   Eyes:      Extraocular Movements: Extraocular movements intact.      Pupils: Pupils are equal, round, and reactive to light.   Cardiovascular:      Pulses: Normal pulses.      Heart sounds: No murmur heard.     Comments: 1+ PT pulses bilaterally  Pulmonary:      Effort: Pulmonary effort is normal.      Breath sounds: Normal breath sounds.   Abdominal:      General: Bowel sounds are normal.      Palpations: Abdomen is soft.   Musculoskeletal:      Comments: R leg appears bigger chronically since procedure; but not edematous.    Skin:     Comments: deep, pustular, erythematous skin around bilateral lower extremities   Neurological:      Mental Status: He is alert.      Comments: Full range of motion, normal intact sensation in BUE and BLE   Home Medications     Medication List      START taking these medications     amoxicillin-pot clavulanate 875-125 mg tablet; Commonly known as:   Augmentin; Take 1 tablet (875 mg) by mouth 2 times a day for 7 days.     CONTINUE taking these medications     aspirin 81 mg EC tablet   atorvastatin 40 mg  tablet; Commonly known as: Lipitor   empagliflozin 25 mg; Commonly known as: Jardiance   glipiZIDE 5 mg tablet; Commonly known as: Glucotrol   losartan 50 mg tablet; Commonly known as: Cozaar   metFORMIN 500 mg tablet; Commonly known as: Glucophage   semaglutide 1 mg/dose (4 mg/3 mL) pen injector; Commonly known as:   OZEMPIC     STOP taking these medications     ibuprofen 200 mg tablet       Outpatient Follow-Up  No future appointments.    Nakul Rios MD    Attending:  Patient seen and examined during morning rounds.  Patient doing well today  Denies any new complaints    PE:  No acute distress  Lungs - clear  CV - S1, S2 - nl      Medical Decision Making:  Case discussed with endovascular limb salvage service attending Dr. Jorge Boone.  He will be scheduling an endovascular procedure for the patient this coming Friday, February 9, 2024.  Patient's blood cultures have been negative.  His wound cultures have not grown any specific bacteria.  At this time patient does not have any further inpatient needs and management of his leg ulcers can be pursued as an outpatient.  Will discharge patient home today on a 7-day course of Augmentin. Dr. Boone's team will be in touch with the patient to bring him in electively for the procedure on Friday.      35 minutes were spent in discharge management that included evaluation of the patient during rounds, review of labs, radiology, discharge medications and follow-up advice.

## 2024-02-05 DIAGNOSIS — I70.239: ICD-10-CM

## 2024-02-05 DIAGNOSIS — I70.249: ICD-10-CM

## 2024-02-05 DIAGNOSIS — I70.223 CRITICAL LIMB ISCHEMIA OF BOTH LOWER EXTREMITIES (MULTI): Primary | ICD-10-CM

## 2024-02-05 NOTE — PROGRESS NOTES
Tim Stokes is a 53 y.o. male on day 3 of admission presenting with Wound infection.  Home care orders submitted for patient to [facility] per request.  PCP Dr. Nina to follow HC orders.        Physical Exam    Last Recorded Vitals  Blood pressure 117/75, pulse 69, temperature 36.8 °C (98.2 °F), resp. rate 18, weight 133 kg (293 lb), SpO2 98 %.  Intake/Output last 3 Shifts:  I/O last 3 completed shifts:  In: - (0 mL/kg)   Out: 600 (4.5 mL/kg) [Urine:600 (0.1 mL/kg/hr)]  Weight: 132.9 kg         Assessment/Plan   Principal Problem:    Wound infection        Marge Mcduffie RN

## 2024-02-06 ENCOUNTER — APPOINTMENT (OUTPATIENT)
Dept: WOUND CARE | Facility: CLINIC | Age: 54
End: 2024-02-06
Payer: COMMERCIAL

## 2024-02-09 ENCOUNTER — APPOINTMENT (OUTPATIENT)
Dept: CARDIOLOGY | Facility: HOSPITAL | Age: 54
DRG: 271 | End: 2024-02-09
Payer: COMMERCIAL

## 2024-02-09 ENCOUNTER — HOSPITAL ENCOUNTER (INPATIENT)
Facility: HOSPITAL | Age: 54
LOS: 2 days | Discharge: HOME | DRG: 271 | End: 2024-02-14
Attending: INTERNAL MEDICINE | Admitting: INTERNAL MEDICINE
Payer: COMMERCIAL

## 2024-02-09 DIAGNOSIS — I70.249: ICD-10-CM

## 2024-02-09 DIAGNOSIS — T14.8XXA WOUND INFECTION: ICD-10-CM

## 2024-02-09 DIAGNOSIS — I70.239: ICD-10-CM

## 2024-02-09 DIAGNOSIS — I70.245 ATHEROSCLEROSIS OF NATIVE ARTERIES OF LEFT LEG WITH ULCERATION OF OTHER PART OF FOOT (MULTI): ICD-10-CM

## 2024-02-09 DIAGNOSIS — I70.223 CRITICAL LIMB ISCHEMIA OF BOTH LOWER EXTREMITIES (MULTI): ICD-10-CM

## 2024-02-09 DIAGNOSIS — L08.9 WOUND INFECTION: ICD-10-CM

## 2024-02-09 DIAGNOSIS — Z74.09 IMPAIRED MOBILITY AND ACTIVITIES OF DAILY LIVING: ICD-10-CM

## 2024-02-09 DIAGNOSIS — I70.242 ATHEROSCLEROSIS OF NATIVE ARTERIES OF LEFT LEG WITH ULCERATION OF CALF (MULTI): ICD-10-CM

## 2024-02-09 DIAGNOSIS — Z78.9 IMPAIRED MOBILITY AND ACTIVITIES OF DAILY LIVING: ICD-10-CM

## 2024-02-09 DIAGNOSIS — I73.9 PAD (PERIPHERAL ARTERY DISEASE) (CMS-HCC): Primary | ICD-10-CM

## 2024-02-09 DIAGNOSIS — I70.244 ATHEROSCLEROSIS OF NATIVE ARTERIES OF LEFT LEG WITH ULCERATION OF HEEL AND MIDFOOT (MULTI): ICD-10-CM

## 2024-02-09 LAB
EST. AVERAGE GLUCOSE BLD GHB EST-MCNC: 157 MG/DL
GLUCOSE BLD MANUAL STRIP-MCNC: 108 MG/DL (ref 74–99)
GLUCOSE BLD MANUAL STRIP-MCNC: 92 MG/DL (ref 74–99)
HBA1C MFR BLD: 7.1 %

## 2024-02-09 PROCEDURE — 75716 ARTERY X-RAYS ARMS/LEGS: CPT | Performed by: INTERNAL MEDICINE

## 2024-02-09 PROCEDURE — 2500000001 HC RX 250 WO HCPCS SELF ADMINISTERED DRUGS (ALT 637 FOR MEDICARE OP): Performed by: PHYSICIAN ASSISTANT

## 2024-02-09 PROCEDURE — 82947 ASSAY GLUCOSE BLOOD QUANT: CPT

## 2024-02-09 PROCEDURE — 7100000009 HC PHASE TWO TIME - INITIAL BASE CHARGE: Performed by: INTERNAL MEDICINE

## 2024-02-09 PROCEDURE — RXMED WILLOW AMBULATORY MEDICATION CHARGE

## 2024-02-09 PROCEDURE — C1769 GUIDE WIRE: HCPCS | Performed by: INTERNAL MEDICINE

## 2024-02-09 PROCEDURE — 36247 INS CATH ABD/L-EXT ART 3RD: CPT | Performed by: INTERNAL MEDICINE

## 2024-02-09 PROCEDURE — 2500000005 HC RX 250 GENERAL PHARMACY W/O HCPCS: Performed by: INTERNAL MEDICINE

## 2024-02-09 PROCEDURE — 75710 ARTERY X-RAYS ARM/LEG: CPT | Performed by: INTERNAL MEDICINE

## 2024-02-09 PROCEDURE — 93005 ELECTROCARDIOGRAM TRACING: CPT

## 2024-02-09 PROCEDURE — 99222 1ST HOSP IP/OBS MODERATE 55: CPT | Performed by: INTERNAL MEDICINE

## 2024-02-09 PROCEDURE — 047N3Z1 DILATION OF LEFT POPLITEAL ARTERY USING DRUG-COATED BALLOON, PERCUTANEOUS APPROACH: ICD-10-PCS | Performed by: INTERNAL MEDICINE

## 2024-02-09 PROCEDURE — C2623 CATH, TRANSLUMIN, DRUG-COAT: HCPCS | Performed by: INTERNAL MEDICINE

## 2024-02-09 PROCEDURE — 2500000004 HC RX 250 GENERAL PHARMACY W/ HCPCS (ALT 636 FOR OP/ED): Performed by: PHYSICIAN ASSISTANT

## 2024-02-09 PROCEDURE — 93010 ELECTROCARDIOGRAM REPORT: CPT | Performed by: INTERNAL MEDICINE

## 2024-02-09 PROCEDURE — 7100000011 HC EXTENDED STAY RECOVERY HOURLY - NURSING UNIT

## 2024-02-09 PROCEDURE — C1725 CATH, TRANSLUMIN NON-LASER: HCPCS | Performed by: INTERNAL MEDICINE

## 2024-02-09 PROCEDURE — 83036 HEMOGLOBIN GLYCOSYLATED A1C: CPT | Performed by: PHYSICIAN ASSISTANT

## 2024-02-09 PROCEDURE — 7100000010 HC PHASE TWO TIME - EACH INCREMENTAL 1 MINUTE: Performed by: INTERNAL MEDICINE

## 2024-02-09 PROCEDURE — 2500000001 HC RX 250 WO HCPCS SELF ADMINISTERED DRUGS (ALT 637 FOR MEDICARE OP): Performed by: INTERNAL MEDICINE

## 2024-02-09 PROCEDURE — 93799 UNLISTED CV SVC/PROCEDURE: CPT | Performed by: INTERNAL MEDICINE

## 2024-02-09 PROCEDURE — 2500000004 HC RX 250 GENERAL PHARMACY W/ HCPCS (ALT 636 FOR OP/ED): Performed by: INTERNAL MEDICINE

## 2024-02-09 PROCEDURE — 047L3Z1 DILATION OF LEFT FEMORAL ARTERY USING DRUG-COATED BALLOON, PERCUTANEOUS APPROACH: ICD-10-PCS | Performed by: INTERNAL MEDICINE

## 2024-02-09 PROCEDURE — 85347 COAGULATION TIME ACTIVATED: CPT

## 2024-02-09 PROCEDURE — 37224 PR REVSC OPN/PRG FEM/POP W/ANGIOPLASTY UNI: CPT | Performed by: INTERNAL MEDICINE

## 2024-02-09 PROCEDURE — C1894 INTRO/SHEATH, NON-LASER: HCPCS | Performed by: INTERNAL MEDICINE

## 2024-02-09 PROCEDURE — 2720000007 HC OR 272 NO HCPCS: Performed by: INTERNAL MEDICINE

## 2024-02-09 PROCEDURE — 2550000001 HC RX 255 CONTRASTS: Performed by: INTERNAL MEDICINE

## 2024-02-09 PROCEDURE — C1887 CATHETER, GUIDING: HCPCS | Performed by: INTERNAL MEDICINE

## 2024-02-09 PROCEDURE — 36415 COLL VENOUS BLD VENIPUNCTURE: CPT | Performed by: PHYSICIAN ASSISTANT

## 2024-02-09 PROCEDURE — 37228 PR REVSC OPN/PRQ TIB/PERO W/ANGIOPLASTY UNI: CPT | Performed by: INTERNAL MEDICINE

## 2024-02-09 PROCEDURE — 37228 HC REVASCULARIZE TIBIAL/PERON ARTERY,ANGIOPLASTY INITIAL: CPT | Performed by: INTERNAL MEDICINE

## 2024-02-09 PROCEDURE — 047U3Z1 DILATION OF LEFT PERONEAL ARTERY USING DRUG-COATED BALLOON, PERCUTANEOUS APPROACH: ICD-10-PCS | Performed by: INTERNAL MEDICINE

## 2024-02-09 PROCEDURE — 99153 MOD SED SAME PHYS/QHP EA: CPT | Performed by: INTERNAL MEDICINE

## 2024-02-09 PROCEDURE — 04CU3ZZ EXTIRPATION OF MATTER FROM LEFT PERONEAL ARTERY, PERCUTANEOUS APPROACH: ICD-10-PCS | Performed by: INTERNAL MEDICINE

## 2024-02-09 PROCEDURE — 99152 MOD SED SAME PHYS/QHP 5/>YRS: CPT | Performed by: INTERNAL MEDICINE

## 2024-02-09 PROCEDURE — 37224 HC REVASCULARIZE FEM/POP ARTERY,ANGIOPLASTY: CPT | Performed by: INTERNAL MEDICINE

## 2024-02-09 RX ORDER — GLIPIZIDE 5 MG/1
5 TABLET ORAL 2 TIMES DAILY
Status: DISCONTINUED | OUTPATIENT
Start: 2024-02-09 | End: 2024-02-14 | Stop reason: HOSPADM

## 2024-02-09 RX ORDER — IBUPROFEN 200 MG
1 TABLET ORAL DAILY
Qty: 14 PATCH | Refills: 0 | Status: CANCELLED | OUTPATIENT
Start: 2024-03-22 | End: 2024-04-05

## 2024-02-09 RX ORDER — LOSARTAN POTASSIUM 50 MG/1
50 TABLET ORAL DAILY
Status: DISCONTINUED | OUTPATIENT
Start: 2024-02-09 | End: 2024-02-14 | Stop reason: HOSPADM

## 2024-02-09 RX ORDER — ASPIRIN 325 MG
TABLET ORAL AS NEEDED
Status: DISCONTINUED | OUTPATIENT
Start: 2024-02-09 | End: 2024-02-09 | Stop reason: HOSPADM

## 2024-02-09 RX ORDER — IODIXANOL 270 MG/ML
INJECTION, SOLUTION INTRAVASCULAR AS NEEDED
Status: DISCONTINUED | OUTPATIENT
Start: 2024-02-09 | End: 2024-02-09 | Stop reason: HOSPADM

## 2024-02-09 RX ORDER — FENTANYL CITRATE 50 UG/ML
INJECTION, SOLUTION INTRAMUSCULAR; INTRAVENOUS AS NEEDED
Status: DISCONTINUED | OUTPATIENT
Start: 2024-02-09 | End: 2024-02-09 | Stop reason: HOSPADM

## 2024-02-09 RX ORDER — CILOSTAZOL 50 MG/1
50 TABLET ORAL 2 TIMES DAILY
Status: DISCONTINUED | OUTPATIENT
Start: 2024-02-09 | End: 2024-02-14 | Stop reason: HOSPADM

## 2024-02-09 RX ORDER — PETROLATUM 420 MG/G
OINTMENT TOPICAL
Status: DISCONTINUED | OUTPATIENT
Start: 2024-02-09 | End: 2024-02-14 | Stop reason: HOSPADM

## 2024-02-09 RX ORDER — MIDAZOLAM HYDROCHLORIDE 1 MG/ML
INJECTION INTRAMUSCULAR; INTRAVENOUS AS NEEDED
Status: DISCONTINUED | OUTPATIENT
Start: 2024-02-09 | End: 2024-02-09 | Stop reason: HOSPADM

## 2024-02-09 RX ORDER — NITROGLYCERIN 5 MG/ML
INJECTION, SOLUTION INTRAVENOUS AS NEEDED
Status: DISCONTINUED | OUTPATIENT
Start: 2024-02-09 | End: 2024-02-09 | Stop reason: HOSPADM

## 2024-02-09 RX ORDER — CLOPIDOGREL BISULFATE 300 MG/1
TABLET, FILM COATED ORAL AS NEEDED
Status: DISCONTINUED | OUTPATIENT
Start: 2024-02-09 | End: 2024-02-09 | Stop reason: HOSPADM

## 2024-02-09 RX ORDER — IBUPROFEN 200 MG
1 TABLET ORAL DAILY
Status: DISCONTINUED | OUTPATIENT
Start: 2024-03-22 | End: 2024-02-14 | Stop reason: HOSPADM

## 2024-02-09 RX ORDER — IBUPROFEN 200 MG
1 TABLET ORAL DAILY
Qty: 30 PATCH | Refills: 1 | Status: CANCELLED | OUTPATIENT
Start: 2024-02-09 | End: 2024-03-22

## 2024-02-09 RX ORDER — ACETAMINOPHEN 325 MG/1
TABLET ORAL AS NEEDED
Status: DISCONTINUED | OUTPATIENT
Start: 2024-02-09 | End: 2024-02-09 | Stop reason: HOSPADM

## 2024-02-09 RX ORDER — HYDROMORPHONE HYDROCHLORIDE 1 MG/ML
0.2 INJECTION, SOLUTION INTRAMUSCULAR; INTRAVENOUS; SUBCUTANEOUS EVERY 4 HOURS PRN
Status: DISCONTINUED | OUTPATIENT
Start: 2024-02-09 | End: 2024-02-14 | Stop reason: HOSPADM

## 2024-02-09 RX ORDER — HEPARIN SODIUM 1000 [USP'U]/ML
INJECTION, SOLUTION INTRAVENOUS; SUBCUTANEOUS AS NEEDED
Status: DISCONTINUED | OUTPATIENT
Start: 2024-02-09 | End: 2024-02-09 | Stop reason: HOSPADM

## 2024-02-09 RX ORDER — OXYCODONE HYDROCHLORIDE 5 MG/1
5 TABLET ORAL EVERY 6 HOURS PRN
Status: DISCONTINUED | OUTPATIENT
Start: 2024-02-09 | End: 2024-02-14 | Stop reason: HOSPADM

## 2024-02-09 RX ORDER — CLOPIDOGREL BISULFATE 75 MG/1
75 TABLET ORAL DAILY
Qty: 30 TABLET | Refills: 11 | Status: SHIPPED | OUTPATIENT
Start: 2024-02-09 | End: 2025-02-08

## 2024-02-09 RX ORDER — NICOTINE 7MG/24HR
1 PATCH, TRANSDERMAL 24 HOURS TRANSDERMAL DAILY
Qty: 14 PATCH | Refills: 0 | Status: CANCELLED | OUTPATIENT
Start: 2024-04-05 | End: 2024-04-19

## 2024-02-09 RX ORDER — FENTANYL CITRATE 50 UG/ML
25 INJECTION, SOLUTION INTRAMUSCULAR; INTRAVENOUS ONCE
Status: COMPLETED | OUTPATIENT
Start: 2024-02-09 | End: 2024-02-09

## 2024-02-09 RX ORDER — ATORVASTATIN CALCIUM 40 MG/1
40 TABLET, FILM COATED ORAL DAILY
Status: DISCONTINUED | OUTPATIENT
Start: 2024-02-09 | End: 2024-02-14 | Stop reason: HOSPADM

## 2024-02-09 RX ORDER — DEXTROSE 50 % IN WATER (D50W) INTRAVENOUS SYRINGE
25
Status: DISCONTINUED | OUTPATIENT
Start: 2024-02-09 | End: 2024-02-14 | Stop reason: HOSPADM

## 2024-02-09 RX ORDER — METFORMIN HYDROCHLORIDE 500 MG/1
500 TABLET ORAL
Status: CANCELLED
Start: 2024-02-09

## 2024-02-09 RX ORDER — CLOPIDOGREL BISULFATE 75 MG/1
75 TABLET ORAL DAILY
Status: DISCONTINUED | OUTPATIENT
Start: 2024-02-10 | End: 2024-02-14 | Stop reason: HOSPADM

## 2024-02-09 RX ORDER — DEXTROSE MONOHYDRATE 100 MG/ML
0.3 INJECTION, SOLUTION INTRAVENOUS ONCE AS NEEDED
Status: DISCONTINUED | OUTPATIENT
Start: 2024-02-09 | End: 2024-02-14 | Stop reason: HOSPADM

## 2024-02-09 RX ORDER — AMOXICILLIN AND CLAVULANATE POTASSIUM 875; 125 MG/1; MG/1
1 TABLET, FILM COATED ORAL 2 TIMES DAILY
Status: DISCONTINUED | OUTPATIENT
Start: 2024-02-09 | End: 2024-02-14 | Stop reason: HOSPADM

## 2024-02-09 RX ORDER — LIDOCAINE HYDROCHLORIDE 20 MG/ML
INJECTION, SOLUTION INFILTRATION; PERINEURAL AS NEEDED
Status: DISCONTINUED | OUTPATIENT
Start: 2024-02-09 | End: 2024-02-09 | Stop reason: HOSPADM

## 2024-02-09 RX ORDER — IBUPROFEN 200 MG
1 TABLET ORAL DAILY
Status: DISCONTINUED | OUTPATIENT
Start: 2024-02-09 | End: 2024-02-14 | Stop reason: HOSPADM

## 2024-02-09 RX ORDER — LIDOCAINE 40 MG/G
1 CREAM TOPICAL 4 TIMES DAILY PRN
Status: DISCONTINUED | OUTPATIENT
Start: 2024-02-09 | End: 2024-02-14 | Stop reason: HOSPADM

## 2024-02-09 RX ORDER — ASPIRIN 81 MG/1
81 TABLET ORAL DAILY
Status: DISCONTINUED | OUTPATIENT
Start: 2024-02-10 | End: 2024-02-14 | Stop reason: HOSPADM

## 2024-02-09 RX ORDER — SODIUM NITROPRUSSIDE 25 MG/ML
INJECTION INTRAVENOUS CONTINUOUS PRN
Status: COMPLETED | OUTPATIENT
Start: 2024-02-09 | End: 2024-02-09

## 2024-02-09 RX ORDER — ACETAMINOPHEN 325 MG/1
650 TABLET ORAL EVERY 6 HOURS PRN
Status: DISCONTINUED | OUTPATIENT
Start: 2024-02-09 | End: 2024-02-14 | Stop reason: HOSPADM

## 2024-02-09 RX ORDER — NICOTINE 7MG/24HR
1 PATCH, TRANSDERMAL 24 HOURS TRANSDERMAL DAILY
Status: DISCONTINUED | OUTPATIENT
Start: 2024-04-05 | End: 2024-02-14 | Stop reason: HOSPADM

## 2024-02-09 RX ORDER — SODIUM CHLORIDE 9 MG/ML
INJECTION, SOLUTION INTRAVENOUS CONTINUOUS PRN
Status: COMPLETED | OUTPATIENT
Start: 2024-02-09 | End: 2024-02-09

## 2024-02-09 RX ORDER — INSULIN LISPRO 100 [IU]/ML
0-5 INJECTION, SOLUTION INTRAVENOUS; SUBCUTANEOUS
Status: DISCONTINUED | OUTPATIENT
Start: 2024-02-09 | End: 2024-02-14 | Stop reason: HOSPADM

## 2024-02-09 RX ADMIN — HYDROMORPHONE HYDROCHLORIDE 0.2 MG: 1 INJECTION, SOLUTION INTRAMUSCULAR; INTRAVENOUS; SUBCUTANEOUS at 22:06

## 2024-02-09 RX ADMIN — ACETAMINOPHEN 650 MG: 325 TABLET ORAL at 11:34

## 2024-02-09 RX ADMIN — LOSARTAN POTASSIUM 50 MG: 50 TABLET, FILM COATED ORAL at 15:56

## 2024-02-09 RX ADMIN — CILOSTAZOL 50 MG: 50 TABLET ORAL at 22:06

## 2024-02-09 RX ADMIN — HYDROMORPHONE HYDROCHLORIDE 0.2 MG: 1 INJECTION, SOLUTION INTRAMUSCULAR; INTRAVENOUS; SUBCUTANEOUS at 17:50

## 2024-02-09 RX ADMIN — OXYCODONE HYDROCHLORIDE 5 MG: 5 TABLET ORAL at 11:35

## 2024-02-09 RX ADMIN — ATORVASTATIN CALCIUM 40 MG: 40 TABLET, FILM COATED ORAL at 20:00

## 2024-02-09 RX ADMIN — ACETAMINOPHEN 650 MG: 325 TABLET ORAL at 16:52

## 2024-02-09 RX ADMIN — FENTANYL CITRATE 25 MCG: 50 INJECTION, SOLUTION INTRAMUSCULAR; INTRAVENOUS at 13:30

## 2024-02-09 RX ADMIN — AMOXICILLIN AND CLAVULANATE POTASSIUM 1 TABLET: 875; 125 TABLET, FILM COATED ORAL at 21:00

## 2024-02-09 RX ADMIN — GLIPIZIDE 5 MG: 5 TABLET ORAL at 21:00

## 2024-02-09 RX ADMIN — OXYCODONE HYDROCHLORIDE 5 MG: 5 TABLET ORAL at 16:52

## 2024-02-09 SDOH — SOCIAL STABILITY: SOCIAL INSECURITY: HAS ANYONE EVER THREATENED TO HURT YOUR FAMILY OR YOUR PETS?: NO

## 2024-02-09 SDOH — SOCIAL STABILITY: SOCIAL INSECURITY: DOES ANYONE TRY TO KEEP YOU FROM HAVING/CONTACTING OTHER FRIENDS OR DOING THINGS OUTSIDE YOUR HOME?: NO

## 2024-02-09 SDOH — SOCIAL STABILITY: SOCIAL INSECURITY: ABUSE: ADULT

## 2024-02-09 SDOH — SOCIAL STABILITY: SOCIAL INSECURITY: DO YOU FEEL ANYONE HAS EXPLOITED OR TAKEN ADVANTAGE OF YOU FINANCIALLY OR OF YOUR PERSONAL PROPERTY?: NO

## 2024-02-09 SDOH — SOCIAL STABILITY: SOCIAL INSECURITY: ARE THERE ANY APPARENT SIGNS OF INJURIES/BEHAVIORS THAT COULD BE RELATED TO ABUSE/NEGLECT?: NO

## 2024-02-09 SDOH — SOCIAL STABILITY: SOCIAL INSECURITY: WERE YOU ABLE TO COMPLETE ALL THE BEHAVIORAL HEALTH SCREENINGS?: YES

## 2024-02-09 SDOH — SOCIAL STABILITY: SOCIAL INSECURITY: ARE YOU OR HAVE YOU BEEN THREATENED OR ABUSED PHYSICALLY, EMOTIONALLY, OR SEXUALLY BY ANYONE?: NO

## 2024-02-09 SDOH — SOCIAL STABILITY: SOCIAL INSECURITY: HAVE YOU HAD THOUGHTS OF HARMING ANYONE ELSE?: NO

## 2024-02-09 SDOH — SOCIAL STABILITY: SOCIAL INSECURITY: DO YOU FEEL UNSAFE GOING BACK TO THE PLACE WHERE YOU ARE LIVING?: NO

## 2024-02-09 ASSESSMENT — COGNITIVE AND FUNCTIONAL STATUS - GENERAL
DAILY ACTIVITIY SCORE: 20
TURNING FROM BACK TO SIDE WHILE IN FLAT BAD: A LITTLE
MOVING FROM LYING ON BACK TO SITTING ON SIDE OF FLAT BED WITH BEDRAILS: A LITTLE
CLIMB 3 TO 5 STEPS WITH RAILING: A LOT
HELP NEEDED FOR BATHING: A LITTLE
PATIENT BASELINE BEDBOUND: NO
TOILETING: A LITTLE
MOBILITY SCORE: 14
STANDING UP FROM CHAIR USING ARMS: A LOT
WALKING IN HOSPITAL ROOM: A LOT
DRESSING REGULAR LOWER BODY CLOTHING: A LITTLE
DRESSING REGULAR UPPER BODY CLOTHING: A LITTLE
MOVING TO AND FROM BED TO CHAIR: A LOT

## 2024-02-09 ASSESSMENT — PATIENT HEALTH QUESTIONNAIRE - PHQ9
SUM OF ALL RESPONSES TO PHQ9 QUESTIONS 1 & 2: 0
2. FEELING DOWN, DEPRESSED OR HOPELESS: NOT AT ALL
1. LITTLE INTEREST OR PLEASURE IN DOING THINGS: NOT AT ALL

## 2024-02-09 ASSESSMENT — LIFESTYLE VARIABLES
HOW OFTEN DO YOU HAVE A DRINK CONTAINING ALCOHOL: NEVER
HOW MANY STANDARD DRINKS CONTAINING ALCOHOL DO YOU HAVE ON A TYPICAL DAY: PATIENT DOES NOT DRINK
AUDIT-C TOTAL SCORE: 0
AUDIT-C TOTAL SCORE: 0
HOW OFTEN DO YOU HAVE 6 OR MORE DRINKS ON ONE OCCASION: NEVER
SKIP TO QUESTIONS 9-10: 1

## 2024-02-09 ASSESSMENT — ACTIVITIES OF DAILY LIVING (ADL)
FEEDING YOURSELF: INDEPENDENT
ADEQUATE_TO_COMPLETE_ADL: YES
BATHING: INDEPENDENT
TOILETING: INDEPENDENT
PATIENT'S MEMORY ADEQUATE TO SAFELY COMPLETE DAILY ACTIVITIES?: YES
ASSISTIVE_DEVICE: WALKER
JUDGMENT_ADEQUATE_SAFELY_COMPLETE_DAILY_ACTIVITIES: YES
DRESSING YOURSELF: INDEPENDENT
WALKS IN HOME: INDEPENDENT
HEARING - LEFT EAR: FUNCTIONAL
GROOMING: INDEPENDENT
LACK_OF_TRANSPORTATION: NO
HEARING - RIGHT EAR: FUNCTIONAL

## 2024-02-09 ASSESSMENT — PAIN DESCRIPTION - LOCATION
LOCATION: LEG
LOCATION: LEG

## 2024-02-09 ASSESSMENT — PAIN SCALES - GENERAL
PAINLEVEL_OUTOF10: 8
PAINLEVEL_OUTOF10: 9
PAINLEVEL_OUTOF10: 10 - WORST POSSIBLE PAIN
PAINLEVEL_OUTOF10: 7
PAINLEVEL_OUTOF10: 9

## 2024-02-09 ASSESSMENT — PAIN - FUNCTIONAL ASSESSMENT
PAIN_FUNCTIONAL_ASSESSMENT: 0-10

## 2024-02-09 ASSESSMENT — COLUMBIA-SUICIDE SEVERITY RATING SCALE - C-SSRS
1. IN THE PAST MONTH, HAVE YOU WISHED YOU WERE DEAD OR WISHED YOU COULD GO TO SLEEP AND NOT WAKE UP?: NO
2. HAVE YOU ACTUALLY HAD ANY THOUGHTS OF KILLING YOURSELF?: NO
6. HAVE YOU EVER DONE ANYTHING, STARTED TO DO ANYTHING, OR PREPARED TO DO ANYTHING TO END YOUR LIFE?: NO

## 2024-02-09 ASSESSMENT — PAIN DESCRIPTION - ORIENTATION: ORIENTATION: RIGHT;LEFT

## 2024-02-09 NOTE — PROGRESS NOTES
Pharmacy Medication History Review    Tim Stokes is a 53 y.o. male admitted for No Principal Problem: There is no principal problem currently on the Problem List. Please update the Problem List and refresh.. Pharmacy reviewed the patient's [id number] medications and allergies for accuracy.    The list below reflects the updated PTA list. Comments regarding how patient may be taking medications differently can be found in the Admit Orders Activity  Prior to Admission Medications   Prescriptions Last Dose Informant Patient Reported?   amoxicillin-pot clavulanate (Augmentin) 875-125 mg tablet 2/8/2024 Self No   Sig: Take 1 tablet (875 mg) by mouth 2 times a day for 7 days.   aspirin 81 mg EC tablet 2/8/2024 Self Yes   Sig: Take 1 tablet (81 mg) by mouth once daily.   atorvastatin (Lipitor) 40 mg tablet 2/8/2024 Self Yes   Sig: Take 1 tablet (40 mg) by mouth once daily.   empagliflozin (Jardiance) 25 mg Unknown  Not taking because of cost Self Yes   Sig: Take 1 tablet (25 mg) by mouth once daily.   glipiZIDE (Glucotrol) 5 mg tablet 2/8/2024 Self Yes   Sig: Take 1 tablet (5 mg) by mouth 2 times a day.   losartan (Cozaar) 50 mg tablet 2/8/2024 Self Yes   Sig: Take 1 tablet (50 mg) by mouth once daily.   metFORMIN (Glucophage) 500 mg tablet 2/8/2024 Self Yes   Sig: Take 1 tablet (500 mg) by mouth once daily in the evening. Take with meals. Do not crush, chew, or split.   semaglutide (OZEMPIC) 1 mg/dose (4 mg/3 mL) pen injector 2/5/2024 Self Yes   Sig: Inject 1 mg under the skin every 7 days.      Facility-Administered Medications: None        The list below reflects the updated allergy list. Please review each documented allergy for additional clarification and justification.  Allergies  Reviewed by Raquel Small RN on 2/9/2024   No Known Allergies         Patient declines M2B at discharge.     Sources used to complete the med history include out patient fill history, OARRS, and patient interview along  with 2/6/24 office visit Dr. Boone.       Below are additional concerns with the patient's PTA list.  The patient is currently not taking jardiance because of the cost.     Gm Galvan [facility]  Transitions of Care Clinical Pharmacist  Please reach out via Epic Chat for questions, if no response call  [phone] or [employer] [facility]

## 2024-02-09 NOTE — CONSULTS
"CONSULTATION -  VASCULAR MEDICINE    DOS: 2/9/2024    REQUESTING PHYSICIAN:  Chance    REASON FOR CONSULT:  ambar LE ulcers    HISTORY OF PRESENT ILLNESS:     52 yo man currently admitted following LLE intervention. Hx gathered mainly from the chart and from the patient as best possible. He reports approx 2 year h/o LE ulcers.   Dating before the onset of the ulcers - reports Charcot right foot and being casted for most of a summer. After that developed left foot gangrene of toes 3/4. These were amputated and per the patient healed under the influence of a wound VAC with out issue.   Underwent CABG 2022 and this was complicated by sepsis. Reports the wounds developed at the time of the admission for sepsis. Past that point  - can not define the wound care that has been done for the past 2 years. Reports seeing \"many doctors\" having \"many different dressings\". It is unclear if he has had consistent contact for wound care except [facility] for the past 6 months.   Sleeps in a bed but keeps the legs off the side of the bed. Not using any compression. Can not identify the most recent dressing being used on the legs.    Recently seen by chance Ornelas for PAD and consideration for revasc.    Aortogram with bilateral lower extremity runoff ([facility] 1/12)  IMPRESSION:    1.         65% right renal artery stenosis.  2.         Apparent left renal artery stenosis.  3.         70% mid infrarenal aortic stenosis.  4.         Patent right common iliac artery and bilateral external iliac artery stents.    5.         Occluded right internal iliac artery.  6.         Severe stenosis at the origin of the left internal iliac artery.  7.         Moderate left common femoral artery plaque disease.  8.         Occluded left superficial femoral artery.  9.         Reconstitution of a diseased left above-knee popliteal artery.  10.       Mild disease involving the left below-knee popliteal artery and 1 vessel peroneal runoff to the left " foot.    11.       Left peroneal collaterals distally reconstituted the posterior tibial and dorsalis pedis arteries in the left foot.    12.       Severe right common femoral artery plaque disease.  13.       Right superficial femoral artery occlusion.  14.       Right deep femoral collaterals reconstituted diseased right above-knee popliteal artery.    15.       Mild right below-knee popliteal artery disease and 1 vessel peroneal runoff to the right foot.    16.       Moderate right tibioperoneal trunk stenosis.  17.       Right peroneal artery reconstitutes the posterior tibial and dorsalis pedis arteries in the right foot.    Today underwent endovascular management of the LLE:  PTA/DCB of left CFA/SFA/popliteal.  C/b distal embolization.  S/p polypharmacy, PTA and aspiration thrombectomy of Peroneal, PT(glass collateral) and AT(transcollateral).    PMH/PSH:    DM  HTN  HPL  CAD  S/p CABG 2022  PAD with prev LCIA, right and left EIA stenting and POBA left SFA pop  Left foot lateral ray amputation    FAMILY HISTORY:     Not pertinent to the consulting condition    SOCIAL HISTORY:     Tobacco still smoking  Employment works as an     REVIEW OF SYSTEMS:     No fevers, chills, weight is stable  No sores, +ulcers, rashes, skin lesions  No HA, SZ, syncope,   +edema, +calf pain      PHYSICAL EXAMINATION:   Gen: obese man, laying in bed, uncomfortable  HEENT: WNL  Ext: 1+ edema, nontender  Right foot palpable cool  Left normal  Left 2nd toe cyanotic  Skin: overall skin is dry and hyperkeratotic with maceration of the left 1/2 webspace  Multiple LE wound with predominantly a fibronecrotic base  Visible granulating buds  No odor  Dry and desiccated currently  No purulence noted  Pulses: not palpable  Neuro: grossly normal, CN intact, ENRICO x 4  Mood and affect appropriate    ADDITIONAL DATA:     Recent cx:  (2+) Few Polymorphonuclear leukocytes      (3+) Moderate Mixed Gram positive and Gram negative bacteria     Last  available HgA1c 6.2% (7/2022)    ASSESSMENT/PLAN:  Multiple LE ulcers as noted - unclear etiology but in the setting of sepsis ?initially vasculitis. This is complicated by his underlying PAD and other comorbidities of DM and ongoing tobacco abuse. Discussed with the patient the need for consistent and chronic wound care and the expectation of his participation in the process.    REC:   Check HgA1c  Needs nutrition consult for increased protein forwound healing.   Keep the heels off the bed at all times  Avoid dangling the legs off the bed  High dose atorvastatin  Add cilostazol 50 mg BID to his current regimen  Duoderm gel to the  base of the ulcers with ABD change daily  Heavy aquaphor to the skin daily    I will see him again next week.  He can see me in the [facility] wound clinic for ongoing follow up

## 2024-02-09 NOTE — POST-PROCEDURE NOTE
Physician Transition of Care Summary  Invasive Cardiovascular Lab    Procedure Date: 2/9/2024  Attending:    * Jorge Boone - Primary  Resident/Fellow/Other Assistant: Surgeon(s) and Role:     * Yaquelin Lundberg MD MPH - Fellow    Indications:   Pre-op Diagnosis     * Critical limb ischemia of both lower extremities (CMS/HCC) [I70.223]     * Atherosclerosis of both lower extremities with bilateral ulceration (CMS/HCC) [I70.239, I70.249]    Post-procedure diagnosis:   Post-op Diagnosis     * Critical limb ischemia of both lower extremities (CMS/HCC) [I70.223]     * Atherosclerosis of both lower extremities with bilateral ulceration (CMS/HCC) [I70.239, I70.249]    Procedure(s):   Lower Extremity Angiogram  67803 - CHG ANGIOGRAPHY EXTREMITY UNILATERAL RS&I    CHG ANGIOGRAPHY EXTREMITY UNILATERAL RS&I [65658]    Procedure Findings:   Pressure gradient across the aortic lesion is <10 mmHg.  Severe left CFA disease and Occluded left SFA with distal popliteal reconstitution.      Description of the Procedure:   Right common femoral access  6 Fr 55 cm sheath.  PTA/DCB of left CFA/SFA/popliteal.  C/b distal embolization.  S/p polypharmacy, PTA and aspiration thrombectomy of Peroneal, PT(glass collateral) and AT(transcollateral).  Manual pressure to remove the sheat(6 Fr sheath was replaced with 6.5 Fr 11 cm sheath).    Complications:   None    Stents/Implants:       Anticoagulation/Antiplatelet Plan:   Aspirin and plavix and xarelto 2.5 mg bID for atleast 1 year.    Estimated Blood Loss:   20 mL    Anesthesia: Moderate Sedation Anesthesia Staff: No anesthesia staff entered.    Any Specimen(s) Removed:   No specimens collected during this procedure.    Disposition:   Admit to telemetrty      Electronically signed by: Yaquelin Lundberg MD MPH, 2/9/2024 11:46 AM

## 2024-02-09 NOTE — CONSULTS
Wound Care Consult     Visit Date: 2/9/2024      Patient Name: Tim Stokes         MRN: [id number]           YOB: 1970     Reason for Consult: bilateral lower extremities                Wound Team Summary Assessment: Per Dr. Marquez's recommendations:  Cleanse wounds (Vashe wash used today--left at bedside) and periwound skin.  Liberally apply Aquaphor to periwound skin (please order from pharmacy).  Apply Duoderm Gel to open wound beds daily. This can be ordered from central supply.  Cover with ABD pads.  Wrap with kerlix. Hold off on compression for now, but application in near future is recommended.  Patient to keep feet elevated on pillows while in bed and refrain from dangling feet over the side of the bed. Please remind patient if he forgets/refuses.   Bedside RN to perform daily dressing changes. Will follow-up on Monday/Tuesday for reassessment.       Tiffanie Tarango RN  2/9/2024  5:56 PM

## 2024-02-09 NOTE — SIGNIFICANT EVENT
53 year old male with a history of type 2 diabetes mellitus, hypertension, hyperlipidemia, coronary artery disease status post CABGx3 3/22, history of congestive heart failure, morbid obesity, chronic venous stasis dermatitis, and long-standing and ongoing tobacco use, who has peripheral arterial occlusive disease and has non-healing wounds in both lower legs for 1.5 yrs.  He previously has undergone a left common iliac and  bilateral external iliac artery stenting as well as left SFA and popliteal angioplasties in 2017, in addition to 4-5 toe amputation at that time in his left foot.     He presented to the ED on 1/31 with bilateral lower extremity wound infection. He was sent by podiatrist Dr. Gm Garcia, who noticed Pt wound had foul odor and drainage with concern for infection.  Pt was seen by Dr Boone on 1/22/2024. The plan was to refer him to vascular surgery. However after discussion with Dr. Godinez, at this time, patient does not have a great option for treatment of his aortic lesions, as open surgery would likely prove complicated and highly risky therefore on 2/9/24 he presented for Endovascular revascularization with Dr Boone.    #CLI/PAD, #chronic venous stasis dermatitis   #Bilateral lower extremity wounds, acute on chronic   - wound culture 1/30 podiatry clinic: 2+ few polymorphonuclear leukocytes, moderate mixed gram positive and gram negative bacteria, on Augmentin, end date 2/11/24  - 2/9/24 s/p PTA/DCB of left CFA/SFA/popliteal, C/b distal embolization. S/p polypharmacy, PTA and aspiration thrombectomy of Peroneal, PT(glass collateral) and AT(transcollateral).  - access site Rt CFA, 6.5 fr, manually removed, bed rest for 5 hrs after hemostasis  - continue with ASA, start Plavix and Xarelto 2.5 mg PO BID (starting tomorrow). Rx for meds to bed sent, pt was also referred to specialty pharmacy for Xarelto assistance program  - start Nicotine patch (Rx  sent to pt's pharmacy)  - pain  control with oxy, Dilaudid, Tylenol PRN, and Lidocaine topical cream to the wounds  - leg elevation  and compression for aggressive edema control  - wound care nurse consult for dressing changes  - Dr Marquez consult for wound management- appreciate the recs      #T2DM  -holding metformin,   -mild ISS  - carb control diet      #HDL   #CAD status post CABG X3  #HTN  #CHF  -continue home medications    Stephanie Mendiola PA-C   [facility]   Day: [id number]/Haiku/Night [facility] [id number]

## 2024-02-10 LAB
ANION GAP SERPL CALC-SCNC: 15 MMOL/L (ref 10–20)
BUN SERPL-MCNC: 14 MG/DL (ref 6–23)
CALCIUM SERPL-MCNC: 8.4 MG/DL (ref 8.6–10.6)
CHLORIDE SERPL-SCNC: 103 MMOL/L (ref 98–107)
CO2 SERPL-SCNC: 20 MMOL/L (ref 21–32)
CREAT SERPL-MCNC: 0.72 MG/DL (ref 0.5–1.3)
EGFRCR SERPLBLD CKD-EPI 2021: >90 ML/MIN/1.73M*2
ERYTHROCYTE [DISTWIDTH] IN BLOOD BY AUTOMATED COUNT: 14.7 % (ref 11.5–14.5)
GLUCOSE BLD MANUAL STRIP-MCNC: 148 MG/DL (ref 74–99)
GLUCOSE BLD MANUAL STRIP-MCNC: 169 MG/DL (ref 74–99)
GLUCOSE BLD MANUAL STRIP-MCNC: 185 MG/DL (ref 74–99)
GLUCOSE BLD MANUAL STRIP-MCNC: 253 MG/DL (ref 74–99)
GLUCOSE SERPL-MCNC: 144 MG/DL (ref 74–99)
HCT VFR BLD AUTO: 39.8 % (ref 41–52)
HGB BLD-MCNC: 12.9 G/DL (ref 13.5–17.5)
MCH RBC QN AUTO: 28.6 PG (ref 26–34)
MCHC RBC AUTO-ENTMCNC: 32.4 G/DL (ref 32–36)
MCV RBC AUTO: 88 FL (ref 80–100)
NRBC BLD-RTO: 0 /100 WBCS (ref 0–0)
PLATELET # BLD AUTO: 304 X10*3/UL (ref 150–450)
POTASSIUM SERPL-SCNC: 4.2 MMOL/L (ref 3.5–5.3)
RBC # BLD AUTO: 4.51 X10*6/UL (ref 4.5–5.9)
SODIUM SERPL-SCNC: 134 MMOL/L (ref 136–145)
WBC # BLD AUTO: 9.7 X10*3/UL (ref 4.4–11.3)

## 2024-02-10 PROCEDURE — 7100000011 HC EXTENDED STAY RECOVERY HOURLY - NURSING UNIT

## 2024-02-10 PROCEDURE — 2500000004 HC RX 250 GENERAL PHARMACY W/ HCPCS (ALT 636 FOR OP/ED): Performed by: PHYSICIAN ASSISTANT

## 2024-02-10 PROCEDURE — 85027 COMPLETE CBC AUTOMATED: CPT | Performed by: PHYSICIAN ASSISTANT

## 2024-02-10 PROCEDURE — 80048 BASIC METABOLIC PNL TOTAL CA: CPT | Performed by: PHYSICIAN ASSISTANT

## 2024-02-10 PROCEDURE — 36415 COLL VENOUS BLD VENIPUNCTURE: CPT | Performed by: PHYSICIAN ASSISTANT

## 2024-02-10 PROCEDURE — 82947 ASSAY GLUCOSE BLOOD QUANT: CPT

## 2024-02-10 PROCEDURE — S4991 NICOTINE PATCH NONLEGEND: HCPCS | Performed by: PHYSICIAN ASSISTANT

## 2024-02-10 PROCEDURE — 2500000002 HC RX 250 W HCPCS SELF ADMINISTERED DRUGS (ALT 637 FOR MEDICARE OP, ALT 636 FOR OP/ED): Performed by: PHYSICIAN ASSISTANT

## 2024-02-10 PROCEDURE — 2500000001 HC RX 250 WO HCPCS SELF ADMINISTERED DRUGS (ALT 637 FOR MEDICARE OP): Performed by: PHYSICIAN ASSISTANT

## 2024-02-10 RX ADMIN — AMOXICILLIN AND CLAVULANATE POTASSIUM 1 TABLET: 875; 125 TABLET, FILM COATED ORAL at 20:41

## 2024-02-10 RX ADMIN — OXYCODONE HYDROCHLORIDE 5 MG: 5 TABLET ORAL at 23:20

## 2024-02-10 RX ADMIN — AMOXICILLIN AND CLAVULANATE POTASSIUM 1 TABLET: 875; 125 TABLET, FILM COATED ORAL at 08:14

## 2024-02-10 RX ADMIN — ACETAMINOPHEN 650 MG: 325 TABLET ORAL at 08:25

## 2024-02-10 RX ADMIN — GLIPIZIDE 5 MG: 5 TABLET ORAL at 20:41

## 2024-02-10 RX ADMIN — OXYCODONE HYDROCHLORIDE 5 MG: 5 TABLET ORAL at 16:12

## 2024-02-10 RX ADMIN — OXYCODONE HYDROCHLORIDE 5 MG: 5 TABLET ORAL at 08:25

## 2024-02-10 RX ADMIN — ACETAMINOPHEN 650 MG: 325 TABLET ORAL at 16:12

## 2024-02-10 RX ADMIN — CILOSTAZOL 50 MG: 50 TABLET ORAL at 20:41

## 2024-02-10 RX ADMIN — ATORVASTATIN CALCIUM 40 MG: 40 TABLET, FILM COATED ORAL at 20:41

## 2024-02-10 RX ADMIN — NICOTINE 1 PATCH: 21 PATCH, EXTENDED RELEASE TRANSDERMAL at 08:16

## 2024-02-10 RX ADMIN — CILOSTAZOL 50 MG: 50 TABLET ORAL at 08:16

## 2024-02-10 RX ADMIN — HYDROMORPHONE HYDROCHLORIDE 0.2 MG: 1 INJECTION, SOLUTION INTRAMUSCULAR; INTRAVENOUS; SUBCUTANEOUS at 20:42

## 2024-02-10 RX ADMIN — LOSARTAN POTASSIUM 50 MG: 50 TABLET, FILM COATED ORAL at 08:14

## 2024-02-10 RX ADMIN — GLIPIZIDE 5 MG: 5 TABLET ORAL at 08:15

## 2024-02-10 RX ADMIN — CLOPIDOGREL BISULFATE 75 MG: 75 TABLET ORAL at 08:14

## 2024-02-10 RX ADMIN — RIVAROXABAN 2.5 MG: 2.5 TABLET, FILM COATED ORAL at 08:15

## 2024-02-10 RX ADMIN — ASPIRIN 81 MG: 81 TABLET, COATED ORAL at 08:14

## 2024-02-10 RX ADMIN — INSULIN LISPRO 1 UNITS: 100 INJECTION, SOLUTION INTRAVENOUS; SUBCUTANEOUS at 13:12

## 2024-02-10 RX ADMIN — HYDROMORPHONE HYDROCHLORIDE 0.2 MG: 1 INJECTION, SOLUTION INTRAMUSCULAR; INTRAVENOUS; SUBCUTANEOUS at 16:19

## 2024-02-10 ASSESSMENT — PAIN SCALES - GENERAL
PAINLEVEL_OUTOF10: 6
PAINLEVEL_OUTOF10: 10 - WORST POSSIBLE PAIN
PAINLEVEL_OUTOF10: 5 - MODERATE PAIN
PAINLEVEL_OUTOF10: 7
PAINLEVEL_OUTOF10: 10 - WORST POSSIBLE PAIN
PAINLEVEL_OUTOF10: 5 - MODERATE PAIN

## 2024-02-10 ASSESSMENT — COGNITIVE AND FUNCTIONAL STATUS - GENERAL
CLIMB 3 TO 5 STEPS WITH RAILING: A LOT
TOILETING: A LITTLE
DRESSING REGULAR LOWER BODY CLOTHING: A LITTLE
MOBILITY SCORE: 14
DRESSING REGULAR LOWER BODY CLOTHING: A LITTLE
STANDING UP FROM CHAIR USING ARMS: A LOT
HELP NEEDED FOR BATHING: A LITTLE
DRESSING REGULAR UPPER BODY CLOTHING: A LITTLE
MOVING TO AND FROM BED TO CHAIR: A LOT
CLIMB 3 TO 5 STEPS WITH RAILING: A LOT
DAILY ACTIVITIY SCORE: 20
STANDING UP FROM CHAIR USING ARMS: A LOT
HELP NEEDED FOR BATHING: A LITTLE
WALKING IN HOSPITAL ROOM: A LOT
DRESSING REGULAR UPPER BODY CLOTHING: A LITTLE
TOILETING: A LITTLE
DAILY ACTIVITIY SCORE: 20
MOVING TO AND FROM BED TO CHAIR: A LOT
MOVING FROM LYING ON BACK TO SITTING ON SIDE OF FLAT BED WITH BEDRAILS: A LITTLE
WALKING IN HOSPITAL ROOM: A LOT
MOBILITY SCORE: 14
TURNING FROM BACK TO SIDE WHILE IN FLAT BAD: A LITTLE
MOVING FROM LYING ON BACK TO SITTING ON SIDE OF FLAT BED WITH BEDRAILS: A LITTLE
TURNING FROM BACK TO SIDE WHILE IN FLAT BAD: A LITTLE

## 2024-02-10 ASSESSMENT — PAIN - FUNCTIONAL ASSESSMENT
PAIN_FUNCTIONAL_ASSESSMENT: 0-10

## 2024-02-10 ASSESSMENT — PAIN DESCRIPTION - LOCATION
LOCATION: LEG

## 2024-02-10 ASSESSMENT — PAIN DESCRIPTION - ORIENTATION
ORIENTATION: RIGHT;LEFT

## 2024-02-10 NOTE — PROGRESS NOTES
"Subjective Data:  Patient was seen and examined on bedside.  Patient is having significant pain in left leg.  Patient denies any other complains  Would care/vascular medicine evaluate the patient.    Overnight Events:    No overnight events     Objective Data:  Last Recorded Vitals:  Vitals:    02/09/24 2340 02/09/24 2342 02/10/24 0628 02/10/24 0743   BP: (!) 175/91 (!) 165/96 166/72 133/72   Pulse: 97 97 88 91   Resp: 20  19 18   Temp: 36.7 °C (98.1 °F)  37 °C (98.6 °F) 37.1 °C (98.8 °F)   TempSrc:       SpO2: 97% 95% 93% 96%   Weight:       Height:           Last Labs:  CBC - 2/10/2024:  7:58 AM  9.7 12.9 304    39.8      CMP - 2/10/2024:  7:58 AM  8.4 7.2 10 --- 0.3   3.5 2.9 6 77      PTT - No results in last year.  _   _ _     HGBA1C   Date/Time Value Ref Range Status   02/09/2024 07:10 PM 7.1 see below % Final   07/06/2022 02:30 PM 6.2 4.3 - 5.6 % Final     Comment:     American Diabetes Association guidelines indicate that patients with HgbA1c in the range 5.7-6.4% are at increased risk for development of diabetes, and intervention by lifestyle modification may be beneficial. HgbA1c greater or equal to 6.5% is considered diagnostic of diabetes.   03/17/2022 02:34 PM 6.3 4.3 - 5.6 % Final     Comment:     American Diabetes Association guidelines indicate that patients with HgbA1c in the range 5.7-6.4% are at increased risk for development of diabetes, and intervention by lifestyle modification may be beneficial. HgbA1c greater or equal to 6.5% is considered diagnostic of diabetes.      Last I/O:  I/O last 3 completed shifts:  In: - (0 mL/kg)   Out: 1170 (8.8 mL/kg) [Urine:1150 (0.2 mL/kg/hr); Blood:20]  Weight: 133 kg     Past Cardiology Tests (Last 3 Years):  EKG:  Electrocardiogram 12 Lead 02/09/2024 (Preliminary)    Echo:  No results found for this or any previous visit from the past 1095 days.    Ejection Fractions:  No results found for: \"EF\"  Cath:  No results found for this or any previous visit from " the past 1095 days.    Stress Test:  No results found for this or any previous visit from the past 1095 days.    Cardiac Imaging:  No results found for this or any previous visit from the past 1095 days.      Inpatient Medications:  Scheduled medications   Medication Dose Route Frequency    amoxicillin-pot clavulanate  1 tablet oral BID    aspirin  81 mg oral Daily    atorvastatin  40 mg oral Daily    cilostazol  50 mg oral BID    clopidogrel  75 mg oral Daily    glipiZIDE  5 mg oral BID    insulin lispro  0-5 Units subcutaneous TID with meals    losartan  50 mg oral Daily    nicotine  1 patch transdermal Daily    Followed by    [START ON 3/22/2024] nicotine  1 patch transdermal Daily    Followed by    [START ON 4/5/2024] nicotine  1 patch transdermal Daily    rivaroxaban  2.5 mg oral BID with meals     PRN medications   Medication    acetaminophen    dextrose 10 % in water (D10W)    dextrose    glucagon    HYDROmorphone    lidocaine    oxyCODONE    white petrolatum     Continuous Medications   Medication Dose Last Rate       Physical Exam:  General: having pain in left leg, AAO 3, sitting in bed comfortably  Neck: no JVD, no carotid bruits  Heart: S1, S2, no MRG, RRR  Lungs: CTA b/l  Abdomen: soft and non tender, no organomegaly  Legs: bilateral dressings, photoes are reported in previous note.  Neuro: non focal.     Assessment/Plan   54 YO M with Pmhx of DM2, smoker, HTN, HLD, CAD s/p CABG, HF, PAD s/p left and right iliac stentings, chronic venous dermatitis presents with extensive wounds on left leg and large wound on right leg with low EDIN/TBI, concerning for eileen class VI (CLTI).    Pt was seen by Dr Boone on 1/22/2024. The plan was to refer him to vascular surgery. However after discussion with Dr. Godinez, at this time, patient does not have a great option for treatment of his aortic lesions, as open surgery would likely prove complicated and highly risky therefore on 2/9/24 he presented for  Endovascular revascularization with Dr Boone.     #CLI/PAD, #chronic venous stasis dermatitis   #Bilateral lower extremity wounds, acute on chronic   - wound culture 1/30 podiatry clinic: 2+ few polymorphonuclear leukocytes, moderate mixed gram positive and gram negative bacteria, on Augmentin, end date 2/11/24  - 2/9/24 s/p PTA/DCB of left CFA/SFA/popliteal, C/b distal embolization. S/p polypharmacy, PTA and aspiration thrombectomy of Peroneal, PT(glass collateral) and AT(transcollateral).  - access site Rt CFA, 6.5 fr, manually removed, bed rest for 5 hrs after hemostasis  - continue with ASA, start Plavix and Xarelto 2.5 mg PO BID (starting tomorrow). Rx for meds to bed sent, pt was also referred to specialty pharmacy for Xarelto assistance program  - start Nicotine patch (Rx  sent to pt's pharmacy)  - pain control with oxy, Dilaudid, Tylenol PRN, and Lidocaine topical cream to the wounds  - leg elevation  and compression for aggressive edema control  - wound care nurse consult for dressing changes  - Dr Marquez consult for wound management- appreciate the recs        #T2DM  -holding metformin,   -mild ISS  - carb control diet      #HDL   #CAD status post CABG X3  #HTN  #CHF  -continue home medications      Peripheral IV 02/09/24 20 G Right;Dorsal Forearm (Active)   Site Assessment Clean;Dry 02/10/24 0820   Dressing Status Clean;Dry 02/10/24 0820   Number of days: 1       Code Status:  Full Code    I spent 35 minutes in the professional and overall care of this patient.        Yaquelin Lundberg MD MPH

## 2024-02-10 NOTE — CONSULTS
"Nutrition Initial Assessment:   Nutrition Assessment    Reason for Assessment: Provider consult order    Patient is a 53 y.o. male presenting with: PAD. PMH of T2DM, HTN, HDL, CAD status post CABG X3, CHF, PAD, chronic venous stasis dermatitis s/p left common iliac, bilateral external iliac artery stenting, left SFA and popliteal angioplasties, left toe amputation presents with concern of poor circulation in bilateral lower extremities.       Nutrition History:  Energy Intake: Good > 75 %  Food and Nutrient History: Met with Pt at bedside for nutrition assessment. Pt reports to have a good appetite and to have good Po intake, eating >75% of meals. No significant weight changes reported, no nausea or vomiting, no diarrhea or constipation. PTA Pt had good appetite and no supplement use. No other nutrition related questions or concerns at this time.  Food Allergies/Intolerances:  None  GI Symptoms: None  Oral Problems: None       Anthropometrics:  Height: 180.3 cm (5' 10.98\")   Weight: 133 kg (293 lb 3.4 oz)      IBW/kg (Dietitian Calculated): 78 kg  Percent of IBW: 171 %       Weight History:   Wt Readings from Last 15 Encounters:   02/09/24 133 kg (293 lb 3.4 oz)   01/31/24 133 kg (293 lb)   01/22/24 77.1 kg (170 lb)--> likely scale error       Weight Change %:  Weight History / % Weight Change: Limited weight to assess per EMR. Per documented weights no significant weight changes noted  Significant Weight Loss: No    Nutrition Focused Physical Exam Findings:    Subcutaneous Fat Loss:   Orbital Fat Pads: Well nourshed (slightly bulging fat pads)  Buccal Fat Pads: Well nourished (full, rounded cheeks)  Triceps: Well nourished (ample fat tissue)  Muscle Wasting:  Temporalis: Well nourished (well-defined muscle)  Pectoralis (Clavicular Region): Well nourished (clavicle not visible)  Deltoid/Trapezius: Well nourished (rounded appearance at arm, shoulder, neck)  Edema:  Edema: +1 trace  Edema Location: BLE  Physical " Findings:  Skin: Positive (BLE wounds)    Nutrition Significant Labs: Reviewed   BMP Trend:   Results from last 7 days   Lab Units 02/10/24  0758 02/04/24  0601   GLUCOSE mg/dL 144* 139*   CALCIUM mg/dL 8.4* 8.4*   SODIUM mmol/L 134* 134*   POTASSIUM mmol/L 4.2 4.3   CO2 mmol/L 20* 23   CHLORIDE mmol/L 103 102   BUN mg/dL 14 19   CREATININE mg/dL 0.72 0.93    , BG POCT trend:   Results from last 7 days   Lab Units 02/10/24  0813 02/09/24  2018 02/09/24  1610 02/04/24  1158 02/04/24  0755   POCT GLUCOSE mg/dL 148* 108* 92 170* 129*        Nutrition Specific Medications: All med's reviewed noting- atorvastatin, insulin, oxycodone      I/O:   Last BM Date: 02/09/24;          Dietary Orders (From admission, onward)       Start     Ordered    02/09/24 1537  Adult diet Carb Controlled; 75 gram carb/meal, 45 gram Carb evening snack  Diet effective now        Question Answer Comment   Diet type Carb Controlled    Carb diet selection: 75 gram carb/meal, 45 gram Carb evening snack        02/09/24 1536                     Estimated Needs:   Total Energy Estimated Needs (kCal): 2300 kCal (1882-5983 kcals/day)  Method for Estimating Needs: MSJ x 1.2 stress factor  Total Protein Estimated Needs (g): 117 g  Method for Estimating Needs: 1.5 gm/kg of IBW  Total Fluid Estimated Needs (mL): 2300 mL  Method for Estimating Needs: 1mL/kcal or per team        Nutrition Diagnosis   Malnutrition Diagnosis  Patient has Malnutrition Diagnosis: No    Nutrition Diagnosis  Patient has Nutrition Diagnosis: Yes  Diagnosis Status (1): New  Nutrition Diagnosis 1: Increased nutrient needs  Related to (1): increased protein needs due to metabolic demands  As Evidenced by (1): BLE wounds       Nutrition Interventions/Recommendations         Nutrition Prescription:  Individualized Nutrition Prescription Provided for : Continue adult carb controlled diet as tolerated and per team        Nutrition Interventions:   Interventions: Medical food  supplement  Medical Food Supplement: Commercial beverage  Goal: Prostat BID to aid in PRO intake- provides additional 100 kcals and 17 gm PRO per serving         Nutrition Education: N/A         Nutrition Monitoring and Evaluation   Food/Nutrient Related History Monitoring  Monitoring and Evaluation Plan: Energy intake  Energy Intake: Estimated energy intake  Criteria: >75% energy needs met via PO + supplement    Body Composition/Growth/Weight History  Monitoring and Evaluation Plan: Weight  Weight: Measured weight  Criteria: No unplanned significant weight changes    Biochemical Data, Medical Tests and Procedures  Monitoring and Evaluation Plan: Glucose/endocrine profile  Criteria: <180            Time Spent/Follow-up Reminder:   Time Spent (min): 45 minutes  Last Date of Nutrition Visit: 02/10/24  Nutrition Follow-Up Needed?: Dietitian to reassess per policy

## 2024-02-10 NOTE — CARE PLAN
The patient's goals for the shift include      The clinical goals for the shift include        Problem: Pain  Goal: My pain/discomfort is manageable  2/9/2024 2149 by Fredrick Harrison RN  Outcome: Progressing  2/9/2024 2148 by Fredrick Harrison RN  Outcome: Progressing     Problem: Safety  Goal: Patient will be injury free during hospitalization  2/9/2024 2149 by Fredrick Harrison RN  Outcome: Progressing  2/9/2024 2148 by Fredrick Harrison RN  Outcome: Progressing  Goal: I will remain free of falls  2/9/2024 2149 by Fredrick Harrison RN  Outcome: Progressing  2/9/2024 2148 by Fredrick Harrison RN  Outcome: Progressing     Problem: Daily Care  Goal: Daily care needs are met  2/9/2024 2149 by Fredrick Harrison RN  Outcome: Progressing  2/9/2024 2148 by Fredrick Harrison RN  Outcome: Progressing     Problem: Psychosocial Needs  Goal: Demonstrates ability to cope with hospitalization/illness  2/9/2024 2149 by Fredrick Harrison RN  Outcome: Progressing  2/9/2024 2148 by Fredrick Harrison RN  Outcome: Progressing  Goal: Collaborate with me, my family, and caregiver to identify my specific goals  2/9/2024 2149 by Fredrick Harrison RN  Outcome: Progressing  2/9/2024 2148 by Fredrick Harrison RN  Outcome: Progressing  Flowsheets (Taken 2/9/2024 2010)  Cultural Requests During Hospitalization: none  Spiritual Requests During Hospitalization: none     Problem: Discharge Barriers  Goal: My discharge needs are met  2/9/2024 2149 by Fredrick Harrison RN  Outcome: Progressing  2/9/2024 2148 by Fredrick Harrison RN  Outcome: Progressing     Problem: Skin  Goal: Decreased wound size/increased tissue granulation at next dressing change  2/9/2024 2149 by Fredrick Harrison RN  Outcome: Progressing  Flowsheets (Taken 2/9/2024 2149)  Decreased wound size/increased tissue granulation at next dressing change: Promote sleep for wound healing  2/9/2024 2148 by Fredrick Harrison RN  Outcome: Progressing  Goal: Participates in plan/prevention/treatment measures  2/9/2024  2149 by Fredrick Harrison RN  Outcome: Progressing  Flowsheets (Taken 2/9/2024 2149)  Participates in plan/prevention/treatment measures: Elevate heels  2/9/2024 2148 by Fredrick Harrison RN  Outcome: Progressing  Goal: Prevent/manage excess moisture  2/9/2024 2149 by Fredrick Harrison RN  Outcome: Progressing  2/9/2024 2148 by Fredrick Harrison RN  Outcome: Progressing  Goal: Prevent/minimize sheer/friction injuries  2/9/2024 2149 by Fredrick Harrison RN  Outcome: Progressing  Flowsheets (Taken 2/9/2024 2149)  Prevent/minimize sheer/friction injuries:   Use pull sheet   HOB 30 degrees or less  2/9/2024 2148 by Fredrick Harrison RN  Outcome: Progressing  Goal: Promote/optimize nutrition  2/9/2024 2149 by Fredrick Harrison RN  Outcome: Progressing  2/9/2024 2148 by Fredrick Harrison RN  Outcome: Progressing  Goal: Promote skin healing  2/9/2024 2149 by Fredrick Harrison RN  Outcome: Progressing  Flowsheets (Taken 2/9/2024 2149)  Promote skin healing:   Assess skin/pad under line(s)/device(s)   Turn/reposition every 2 hours/use positioning/transfer devices  2/9/2024 2148 by Fredrick Harrison RN  Outcome: Progressing     Problem: Pain  Goal: Takes deep breaths with improved pain control throughout the shift  2/9/2024 2149 by Fredrick Harrison RN  Outcome: Progressing  2/9/2024 2148 by Fredrick Harrison RN  Outcome: Progressing  Goal: Turns in bed with improved pain control throughout the shift  2/9/2024 2149 by Fredrick Harrison RN  Outcome: Progressing  2/9/2024 2148 by Fredrick Harrison RN  Outcome: Progressing  Goal: Walks with improved pain control throughout the shift  2/9/2024 2149 by Fredrick Harrison RN  Outcome: Progressing  2/9/2024 2148 by Fredrick Harriosn RN  Outcome: Progressing  Goal: Performs ADL's with improved pain control throughout shift  2/9/2024 2149 by Jowharah Sabir, RN  Outcome: Progressing  2/9/2024 2148 by Fredrick Harrison, RN  Outcome: Progressing  Goal: Participates in PT with improved pain control throughout the  shift  2/9/2024 2149 by Fredrick Harrison RN  Outcome: Progressing  2/9/2024 2148 by Fredrick Harrison RN  Outcome: Progressing  Goal: Free from opioid side effects throughout the shift  2/9/2024 2149 by Fredrick Harrison RN  Outcome: Progressing  2/9/2024 2148 by Fredrick Harrison RN  Outcome: Progressing  Goal: Free from acute confusion related to pain meds throughout the shift  2/9/2024 2149 by Fredrick Harrison RN  Outcome: Progressing  2/9/2024 2148 by Fredrick Harrison RN  Outcome: Progressing     Problem: Fall/Injury  Goal: Not fall by end of shift  2/9/2024 2149 by Fredrick Harrison RN  Outcome: Progressing  2/9/2024 2148 by Fredrick Harrison RN  Outcome: Progressing  Goal: Be free from injury by end of the shift  2/9/2024 2149 by Fredrick Harrison RN  Outcome: Progressing  2/9/2024 2148 by Fredrick Harrison RN  Outcome: Progressing  Goal: Verbalize understanding of personal risk factors for fall in the hospital  2/9/2024 2149 by Fredrick Harrison RN  Outcome: Progressing  2/9/2024 2148 by Fredrick Harrison RN  Outcome: Progressing  Goal: Verbalize understanding of risk factor reduction measures to prevent injury from fall in the home  2/9/2024 2149 by Fredrick Harrison RN  Outcome: Progressing  2/9/2024 2148 by Fredrick Harrison RN  Outcome: Progressing  Goal: Use assistive devices by end of the shift  2/9/2024 2149 by Fredrick Harrison RN  Outcome: Progressing  2/9/2024 2148 by Fredrick Harrison RN  Outcome: Progressing  Goal: Pace activities to prevent fatigue by end of the shift  2/9/2024 2149 by Fredrick Harrison RN  Outcome: Progressing  2/9/2024 2148 by Fredrick Harrison RN  Outcome: Progressing

## 2024-02-11 LAB
ANION GAP SERPL CALC-SCNC: 14 MMOL/L (ref 10–20)
BUN SERPL-MCNC: 17 MG/DL (ref 6–23)
CALCIUM SERPL-MCNC: 8.7 MG/DL (ref 8.6–10.6)
CHLORIDE SERPL-SCNC: 102 MMOL/L (ref 98–107)
CO2 SERPL-SCNC: 20 MMOL/L (ref 21–32)
CREAT SERPL-MCNC: 0.73 MG/DL (ref 0.5–1.3)
EGFRCR SERPLBLD CKD-EPI 2021: >90 ML/MIN/1.73M*2
ERYTHROCYTE [DISTWIDTH] IN BLOOD BY AUTOMATED COUNT: 14.3 % (ref 11.5–14.5)
GLUCOSE BLD MANUAL STRIP-MCNC: 144 MG/DL (ref 74–99)
GLUCOSE BLD MANUAL STRIP-MCNC: 153 MG/DL (ref 74–99)
GLUCOSE BLD MANUAL STRIP-MCNC: 171 MG/DL (ref 74–99)
GLUCOSE BLD MANUAL STRIP-MCNC: 194 MG/DL (ref 74–99)
GLUCOSE SERPL-MCNC: 129 MG/DL (ref 74–99)
HCT VFR BLD AUTO: 35.4 % (ref 41–52)
HGB BLD-MCNC: 11.3 G/DL (ref 13.5–17.5)
MCH RBC QN AUTO: 27.3 PG (ref 26–34)
MCHC RBC AUTO-ENTMCNC: 31.9 G/DL (ref 32–36)
MCV RBC AUTO: 86 FL (ref 80–100)
NRBC BLD-RTO: 0 /100 WBCS (ref 0–0)
PLATELET # BLD AUTO: 322 X10*3/UL (ref 150–450)
POTASSIUM SERPL-SCNC: 4.1 MMOL/L (ref 3.5–5.3)
RBC # BLD AUTO: 4.14 X10*6/UL (ref 4.5–5.9)
SODIUM SERPL-SCNC: 132 MMOL/L (ref 136–145)
WBC # BLD AUTO: 9.7 X10*3/UL (ref 4.4–11.3)

## 2024-02-11 PROCEDURE — 2500000001 HC RX 250 WO HCPCS SELF ADMINISTERED DRUGS (ALT 637 FOR MEDICARE OP): Performed by: PHYSICIAN ASSISTANT

## 2024-02-11 PROCEDURE — 36415 COLL VENOUS BLD VENIPUNCTURE: CPT | Performed by: PHYSICIAN ASSISTANT

## 2024-02-11 PROCEDURE — 7100000011 HC EXTENDED STAY RECOVERY HOURLY - NURSING UNIT

## 2024-02-11 PROCEDURE — 80048 BASIC METABOLIC PNL TOTAL CA: CPT | Performed by: PHYSICIAN ASSISTANT

## 2024-02-11 PROCEDURE — 82947 ASSAY GLUCOSE BLOOD QUANT: CPT

## 2024-02-11 PROCEDURE — 85027 COMPLETE CBC AUTOMATED: CPT | Performed by: PHYSICIAN ASSISTANT

## 2024-02-11 RX ADMIN — GLIPIZIDE 5 MG: 5 TABLET ORAL at 20:19

## 2024-02-11 RX ADMIN — OXYCODONE HYDROCHLORIDE 5 MG: 5 TABLET ORAL at 08:28

## 2024-02-11 RX ADMIN — ASPIRIN 81 MG: 81 TABLET, COATED ORAL at 08:28

## 2024-02-11 RX ADMIN — RIVAROXABAN 2.5 MG: 2.5 TABLET, FILM COATED ORAL at 08:31

## 2024-02-11 RX ADMIN — RIVAROXABAN 2.5 MG: 2.5 TABLET, FILM COATED ORAL at 17:00

## 2024-02-11 RX ADMIN — ACETAMINOPHEN 650 MG: 325 TABLET ORAL at 16:57

## 2024-02-11 RX ADMIN — ATORVASTATIN CALCIUM 40 MG: 40 TABLET, FILM COATED ORAL at 20:19

## 2024-02-11 RX ADMIN — INSULIN LISPRO 1 UNITS: 100 INJECTION, SOLUTION INTRAVENOUS; SUBCUTANEOUS at 12:15

## 2024-02-11 RX ADMIN — OXYCODONE HYDROCHLORIDE 5 MG: 5 TABLET ORAL at 16:58

## 2024-02-11 RX ADMIN — CILOSTAZOL 50 MG: 50 TABLET ORAL at 20:19

## 2024-02-11 RX ADMIN — LOSARTAN POTASSIUM 50 MG: 50 TABLET, FILM COATED ORAL at 08:28

## 2024-02-11 RX ADMIN — AMOXICILLIN AND CLAVULANATE POTASSIUM 1 TABLET: 875; 125 TABLET, FILM COATED ORAL at 08:28

## 2024-02-11 RX ADMIN — OXYCODONE HYDROCHLORIDE 5 MG: 5 TABLET ORAL at 23:02

## 2024-02-11 RX ADMIN — ACETAMINOPHEN 650 MG: 325 TABLET ORAL at 08:28

## 2024-02-11 RX ADMIN — CILOSTAZOL 50 MG: 50 TABLET ORAL at 08:29

## 2024-02-11 RX ADMIN — CLOPIDOGREL BISULFATE 75 MG: 75 TABLET ORAL at 08:28

## 2024-02-11 RX ADMIN — AMOXICILLIN AND CLAVULANATE POTASSIUM 1 TABLET: 875; 125 TABLET, FILM COATED ORAL at 20:19

## 2024-02-11 RX ADMIN — GLIPIZIDE 5 MG: 5 TABLET ORAL at 08:29

## 2024-02-11 ASSESSMENT — COGNITIVE AND FUNCTIONAL STATUS - GENERAL
STANDING UP FROM CHAIR USING ARMS: A LOT
MOVING TO AND FROM BED TO CHAIR: A LOT
MOBILITY SCORE: 14
TURNING FROM BACK TO SIDE WHILE IN FLAT BAD: A LITTLE
TURNING FROM BACK TO SIDE WHILE IN FLAT BAD: A LITTLE
MOVING FROM LYING ON BACK TO SITTING ON SIDE OF FLAT BED WITH BEDRAILS: A LITTLE
MOVING TO AND FROM BED TO CHAIR: A LOT
DAILY ACTIVITIY SCORE: 20
DRESSING REGULAR UPPER BODY CLOTHING: A LITTLE
WALKING IN HOSPITAL ROOM: A LOT
DRESSING REGULAR LOWER BODY CLOTHING: A LITTLE
MOBILITY SCORE: 14
HELP NEEDED FOR BATHING: A LITTLE
STANDING UP FROM CHAIR USING ARMS: A LOT
MOVING FROM LYING ON BACK TO SITTING ON SIDE OF FLAT BED WITH BEDRAILS: A LITTLE
TOILETING: A LITTLE
DRESSING REGULAR UPPER BODY CLOTHING: A LITTLE
HELP NEEDED FOR BATHING: A LITTLE
WALKING IN HOSPITAL ROOM: A LOT
DRESSING REGULAR LOWER BODY CLOTHING: A LITTLE
CLIMB 3 TO 5 STEPS WITH RAILING: A LOT
DAILY ACTIVITIY SCORE: 20
CLIMB 3 TO 5 STEPS WITH RAILING: A LOT
TOILETING: A LITTLE

## 2024-02-11 ASSESSMENT — PAIN - FUNCTIONAL ASSESSMENT
PAIN_FUNCTIONAL_ASSESSMENT: 0-10

## 2024-02-11 ASSESSMENT — PAIN SCALES - GENERAL
PAINLEVEL_OUTOF10: 5 - MODERATE PAIN
PAINLEVEL_OUTOF10: 10 - WORST POSSIBLE PAIN
PAINLEVEL_OUTOF10: 6
PAINLEVEL_OUTOF10: 10 - WORST POSSIBLE PAIN
PAINLEVEL_OUTOF10: 10 - WORST POSSIBLE PAIN
PAINLEVEL_OUTOF10: 0 - NO PAIN

## 2024-02-11 ASSESSMENT — PAIN DESCRIPTION - LOCATION
LOCATION: LEG

## 2024-02-11 ASSESSMENT — PAIN DESCRIPTION - ORIENTATION: ORIENTATION: RIGHT;LEFT

## 2024-02-11 ASSESSMENT — PAIN SCALES - WONG BAKER: WONGBAKER_NUMERICALRESPONSE: HURTS LITTLE MORE

## 2024-02-11 NOTE — CARE PLAN
The patient's goals for the shift include      The clinical goals for the shift include pt will remain HDS and sleep at least four hours by end of shift      Problem: Pain  Goal: My pain/discomfort is manageable  Outcome: Progressing     Problem: Safety  Goal: Patient will be injury free during hospitalization  Outcome: Progressing  Goal: I will remain free of falls  Outcome: Progressing     Problem: Daily Care  Goal: Daily care needs are met  Outcome: Progressing     Problem: Psychosocial Needs  Goal: Demonstrates ability to cope with hospitalization/illness  Outcome: Progressing  Goal: Collaborate with me, my family, and caregiver to identify my specific goals  Outcome: Progressing     Problem: Discharge Barriers  Goal: My discharge needs are met  Outcome: Progressing     Problem: Skin  Goal: Decreased wound size/increased tissue granulation at next dressing change  Outcome: Progressing  Flowsheets (Taken 2/10/2024 2209)  Decreased wound size/increased tissue granulation at next dressing change: Promote sleep for wound healing  Goal: Participates in plan/prevention/treatment measures  Outcome: Progressing  Flowsheets (Taken 2/10/2024 2209)  Participates in plan/prevention/treatment measures: Elevate heels  Goal: Prevent/manage excess moisture  Outcome: Progressing  Goal: Prevent/minimize sheer/friction injuries  Outcome: Progressing  Flowsheets (Taken 2/10/2024 2209)  Prevent/minimize sheer/friction injuries:   Use pull sheet   Turn/reposition every 2 hours/use positioning/transfer devices  Goal: Promote/optimize nutrition  Outcome: Progressing  Goal: Promote skin healing  Outcome: Progressing  Flowsheets (Taken 2/10/2024 2209)  Promote skin healing:   Assess skin/pad under line(s)/device(s)   Turn/reposition every 2 hours/use positioning/transfer devices     Problem: Pain  Goal: Takes deep breaths with improved pain control throughout the shift  Outcome: Progressing  Goal: Turns in bed with improved pain control  throughout the shift  Outcome: Progressing  Goal: Walks with improved pain control throughout the shift  Outcome: Progressing  Goal: Performs ADL's with improved pain control throughout shift  Outcome: Progressing  Goal: Participates in PT with improved pain control throughout the shift  Outcome: Progressing  Goal: Free from opioid side effects throughout the shift  Outcome: Progressing  Goal: Free from acute confusion related to pain meds throughout the shift  Outcome: Progressing     Problem: Fall/Injury  Goal: Not fall by end of shift  Outcome: Progressing  Goal: Be free from injury by end of the shift  Outcome: Progressing  Goal: Verbalize understanding of personal risk factors for fall in the hospital  Outcome: Progressing  Goal: Verbalize understanding of risk factor reduction measures to prevent injury from fall in the home  Outcome: Progressing  Goal: Use assistive devices by end of the shift  Outcome: Progressing  Goal: Pace activities to prevent fatigue by end of the shift  Outcome: Progressing

## 2024-02-11 NOTE — PROGRESS NOTES
"Subjective Data:  Patient was seen and examined on bedside.  Patient's pain is much better  Patient denies any other complains  Dressing change was performed yesterday by wound care nurse  Would care/vascular medicine evaluated the patient.    Overnight Events:    No overnight events.     Objective Data:  Last Recorded Vitals:  Vitals:    02/10/24 1956 02/10/24 2316 02/11/24 0454 02/11/24 0824   BP: 128/55 158/51 152/66 119/56   Pulse: 85 82 95 96   Resp: 17 19 19 18   Temp: 36.2 °C (97.2 °F) 36.2 °C (97.2 °F) 36.9 °C (98.4 °F) 36.6 °C (97.9 °F)   TempSrc:       SpO2: 94% 94% 95% 94%   Weight:       Height:           Last Labs:  CBC - 2/10/2024:  7:58 AM  9.7 12.9 304    39.8      CMP - 2/10/2024:  7:58 AM  8.4 7.2 10 --- 0.3   3.5 2.9 6 77      PTT - No results in last year.  _   _ _     HGBA1C   Date/Time Value Ref Range Status   02/09/2024 07:10 PM 7.1 see below % Final   07/06/2022 02:30 PM 6.2 4.3 - 5.6 % Final     Comment:     American Diabetes Association guidelines indicate that patients with HgbA1c in the range 5.7-6.4% are at increased risk for development of diabetes, and intervention by lifestyle modification may be beneficial. HgbA1c greater or equal to 6.5% is considered diagnostic of diabetes.   03/17/2022 02:34 PM 6.3 4.3 - 5.6 % Final     Comment:     American Diabetes Association guidelines indicate that patients with HgbA1c in the range 5.7-6.4% are at increased risk for development of diabetes, and intervention by lifestyle modification may be beneficial. HgbA1c greater or equal to 6.5% is considered diagnostic of diabetes.      Last I/O:  I/O last 3 completed shifts:  In: - (0 mL/kg)   Out: 2525 (19 mL/kg) [Urine:2525 (0.5 mL/kg/hr)]  Weight: 133 kg     Past Cardiology Tests (Last 3 Years):  EKG:  Electrocardiogram 12 Lead 02/09/2024 (Preliminary)    Echo:  No results found for this or any previous visit from the past 1095 days.    Ejection Fractions:  No results found for: \"EF\"  Cath:  No " results found for this or any previous visit from the past 1095 days.    Stress Test:  No results found for this or any previous visit from the past 1095 days.    Cardiac Imaging:  No results found for this or any previous visit from the past 1095 days.      Inpatient Medications:  Scheduled medications   Medication Dose Route Frequency    amoxicillin-pot clavulanate  1 tablet oral BID    aspirin  81 mg oral Daily    atorvastatin  40 mg oral Daily    cilostazol  50 mg oral BID    clopidogrel  75 mg oral Daily    glipiZIDE  5 mg oral BID    insulin lispro  0-5 Units subcutaneous TID with meals    losartan  50 mg oral Daily    nicotine  1 patch transdermal Daily    Followed by    [START ON 3/22/2024] nicotine  1 patch transdermal Daily    Followed by    [START ON 4/5/2024] nicotine  1 patch transdermal Daily    rivaroxaban  2.5 mg oral BID with meals     PRN medications   Medication    acetaminophen    dextrose 10 % in water (D10W)    dextrose    glucagon    HYDROmorphone    lidocaine    oxyCODONE    white petrolatum     Continuous Medications   Medication Dose Last Rate       Physical Exam:  General: having pain in left leg, AAO 3, sitting in bed comfortably  Neck: no JVD, no carotid bruits  Heart: S1, S2, no MRG, RRR  Lungs: CTA b/l  Abdomen: soft and non tender, no organomegaly  Legs: bilateral dressings, photoes are reported in previous note.  Neuro: non focal.        Assessment/Plan   54 YO M with Pmhx of DM2, smoker, HTN, HLD, CAD s/p CABG, HF, PAD s/p left and right iliac stentings, chronic venous dermatitis presents with extensive wounds on left leg and large wound on right leg with low EDIN/TBI, concerning for eileen class VI (CLTI).     Pt was seen by Dr Boone on 1/22/2024. The plan was to refer him to vascular surgery. However after discussion with Dr. Godinez, at this time, patient does not have a great option for treatment of his aortic lesions, as open surgery would likely prove complicated and  highly risky therefore on 2/9/24 he presented for Endovascular revascularization with Dr Boone.      #CLI/PAD, #chronic venous stasis dermatitis   #Bilateral lower extremity wounds, acute on chronic   - wound culture 1/30 podiatry clinic: 2+ few polymorphonuclear leukocytes, moderate mixed gram positive and gram negative bacteria, on Augmentin, end date 2/11/24  - 2/9/24 s/p PTA/DCB of left CFA/SFA/popliteal, C/b distal embolization. S/p polypharmacy, PTA and aspiration thrombectomy of Peroneal, PT(glass collateral) and AT(transcollateral).  - access site Rt CFA, 6.5 fr, manually removed, bed rest for 5 hrs after hemostasis  - continue with ASA, start Plavix and Xarelto 2.5 mg PO BID (starting tomorrow). Rx for meds to bed sent, pt was also referred to specialty pharmacy for Xarelto assistance program  - start Nicotine patch (Rx  sent to pt's pharmacy)  - pain control with oxy, Dilaudid, Tylenol PRN, and Lidocaine topical cream to the wounds  - leg elevation  and compression for aggressive edema control  - wound care nurse consult for dressing changes  - Dr Marquez consult for wound management- appreciate the recs        #T2DM  -holding metformin,   -mild ISS  - carb control diet      #HDL   #CAD status post CABG X3  #HTN  #CHF  -continue home medications       Peripheral IV 02/09/24 20 G Right;Dorsal Forearm (Active)   Site Assessment Dry;Clean 02/11/24 0840   Dressing Status Dry;Clean 02/11/24 0840   Number of days: 2       Code Status:  Full Code    I spent 35 minutes in the professional and overall care of this patient.        Yaquelin Lundberg MD MPH

## 2024-02-12 LAB
ACT BLD: 164 SEC (ref 89–169)
ACT BLD: 204 SEC (ref 89–169)
ACT BLD: 234 SEC (ref 89–169)
ACT BLD: 255 SEC (ref 89–169)
ACT BLD: 269 SEC (ref 89–169)
ANION GAP SERPL CALC-SCNC: 16 MMOL/L (ref 10–20)
BUN SERPL-MCNC: 19 MG/DL (ref 6–23)
CALCIUM SERPL-MCNC: 8.6 MG/DL (ref 8.6–10.6)
CHLORIDE SERPL-SCNC: 101 MMOL/L (ref 98–107)
CO2 SERPL-SCNC: 21 MMOL/L (ref 21–32)
CREAT SERPL-MCNC: 0.81 MG/DL (ref 0.5–1.3)
EGFRCR SERPLBLD CKD-EPI 2021: >90 ML/MIN/1.73M*2
ERYTHROCYTE [DISTWIDTH] IN BLOOD BY AUTOMATED COUNT: 14.5 % (ref 11.5–14.5)
GLUCOSE BLD MANUAL STRIP-MCNC: 118 MG/DL (ref 74–99)
GLUCOSE BLD MANUAL STRIP-MCNC: 141 MG/DL (ref 74–99)
GLUCOSE BLD MANUAL STRIP-MCNC: 175 MG/DL (ref 74–99)
GLUCOSE BLD MANUAL STRIP-MCNC: 230 MG/DL (ref 74–99)
GLUCOSE SERPL-MCNC: 111 MG/DL (ref 74–99)
HCT VFR BLD AUTO: 35.2 % (ref 41–52)
HGB BLD-MCNC: 11.8 G/DL (ref 13.5–17.5)
MCH RBC QN AUTO: 28.8 PG (ref 26–34)
MCHC RBC AUTO-ENTMCNC: 33.5 G/DL (ref 32–36)
MCV RBC AUTO: 86 FL (ref 80–100)
NRBC BLD-RTO: 0 /100 WBCS (ref 0–0)
PLATELET # BLD AUTO: 328 X10*3/UL (ref 150–450)
POTASSIUM SERPL-SCNC: 4.3 MMOL/L (ref 3.5–5.3)
RBC # BLD AUTO: 4.1 X10*6/UL (ref 4.5–5.9)
SODIUM SERPL-SCNC: 134 MMOL/L (ref 136–145)
WBC # BLD AUTO: 9.7 X10*3/UL (ref 4.4–11.3)

## 2024-02-12 PROCEDURE — 2500000001 HC RX 250 WO HCPCS SELF ADMINISTERED DRUGS (ALT 637 FOR MEDICARE OP): Performed by: PHYSICIAN ASSISTANT

## 2024-02-12 PROCEDURE — 99232 SBSQ HOSP IP/OBS MODERATE 35: CPT | Performed by: NURSE PRACTITIONER

## 2024-02-12 PROCEDURE — 2500000004 HC RX 250 GENERAL PHARMACY W/ HCPCS (ALT 636 FOR OP/ED): Performed by: PHYSICIAN ASSISTANT

## 2024-02-12 PROCEDURE — 82947 ASSAY GLUCOSE BLOOD QUANT: CPT

## 2024-02-12 PROCEDURE — 85027 COMPLETE CBC AUTOMATED: CPT | Performed by: PHYSICIAN ASSISTANT

## 2024-02-12 PROCEDURE — 1100000001 HC PRIVATE ROOM DAILY

## 2024-02-12 PROCEDURE — 36415 COLL VENOUS BLD VENIPUNCTURE: CPT | Performed by: PHYSICIAN ASSISTANT

## 2024-02-12 PROCEDURE — 80048 BASIC METABOLIC PNL TOTAL CA: CPT | Performed by: PHYSICIAN ASSISTANT

## 2024-02-12 RX ADMIN — OXYCODONE HYDROCHLORIDE 5 MG: 5 TABLET ORAL at 21:49

## 2024-02-12 RX ADMIN — LIDOCAINE 4% 0.1 G: 4 CREAM TOPICAL at 18:06

## 2024-02-12 RX ADMIN — LOSARTAN POTASSIUM 50 MG: 50 TABLET, FILM COATED ORAL at 09:40

## 2024-02-12 RX ADMIN — OXYCODONE HYDROCHLORIDE 5 MG: 5 TABLET ORAL at 09:46

## 2024-02-12 RX ADMIN — RIVAROXABAN 2.5 MG: 2.5 TABLET, FILM COATED ORAL at 17:37

## 2024-02-12 RX ADMIN — GLIPIZIDE 5 MG: 5 TABLET ORAL at 21:49

## 2024-02-12 RX ADMIN — CILOSTAZOL 50 MG: 50 TABLET ORAL at 21:49

## 2024-02-12 RX ADMIN — AMOXICILLIN AND CLAVULANATE POTASSIUM 1 TABLET: 875; 125 TABLET, FILM COATED ORAL at 21:49

## 2024-02-12 RX ADMIN — CILOSTAZOL 50 MG: 50 TABLET ORAL at 09:40

## 2024-02-12 RX ADMIN — INSULIN LISPRO 2 UNITS: 100 INJECTION, SOLUTION INTRAVENOUS; SUBCUTANEOUS at 13:01

## 2024-02-12 RX ADMIN — CLOPIDOGREL BISULFATE 75 MG: 75 TABLET ORAL at 09:40

## 2024-02-12 RX ADMIN — ATORVASTATIN CALCIUM 40 MG: 40 TABLET, FILM COATED ORAL at 09:40

## 2024-02-12 RX ADMIN — GLIPIZIDE 5 MG: 5 TABLET ORAL at 09:46

## 2024-02-12 RX ADMIN — OXYCODONE HYDROCHLORIDE 5 MG: 5 TABLET ORAL at 15:46

## 2024-02-12 RX ADMIN — RIVAROXABAN 2.5 MG: 2.5 TABLET, FILM COATED ORAL at 09:40

## 2024-02-12 RX ADMIN — HYDROMORPHONE HYDROCHLORIDE 0.2 MG: 1 INJECTION, SOLUTION INTRAMUSCULAR; INTRAVENOUS; SUBCUTANEOUS at 17:36

## 2024-02-12 RX ADMIN — AMOXICILLIN AND CLAVULANATE POTASSIUM 1 TABLET: 875; 125 TABLET, FILM COATED ORAL at 09:40

## 2024-02-12 RX ADMIN — ASPIRIN 81 MG: 81 TABLET, COATED ORAL at 09:40

## 2024-02-12 ASSESSMENT — PAIN SCALES - GENERAL
PAINLEVEL_OUTOF10: 5 - MODERATE PAIN
PAINLEVEL_OUTOF10: 0 - NO PAIN
PAINLEVEL_OUTOF10: 5 - MODERATE PAIN
PAINLEVEL_OUTOF10: 2
PAINLEVEL_OUTOF10: 0 - NO PAIN
PAINLEVEL_OUTOF10: 6

## 2024-02-12 ASSESSMENT — COGNITIVE AND FUNCTIONAL STATUS - GENERAL
WALKING IN HOSPITAL ROOM: A LOT
TOILETING: A LITTLE
PERSONAL GROOMING: A LITTLE
MOBILITY SCORE: 14
TURNING FROM BACK TO SIDE WHILE IN FLAT BAD: A LITTLE
DRESSING REGULAR UPPER BODY CLOTHING: A LITTLE
MOVING FROM LYING ON BACK TO SITTING ON SIDE OF FLAT BED WITH BEDRAILS: A LITTLE
STANDING UP FROM CHAIR USING ARMS: A LOT
MOVING TO AND FROM BED TO CHAIR: A LOT
HELP NEEDED FOR BATHING: A LITTLE
DAILY ACTIVITIY SCORE: 19
DRESSING REGULAR LOWER BODY CLOTHING: A LITTLE
CLIMB 3 TO 5 STEPS WITH RAILING: A LOT

## 2024-02-12 ASSESSMENT — PAIN - FUNCTIONAL ASSESSMENT
PAIN_FUNCTIONAL_ASSESSMENT: 0-10

## 2024-02-12 NOTE — PROGRESS NOTES
Tim Stokes is a 53 y.o. male on day 0 of admission presenting with PAD (peripheral artery disease) (CMS/HCC).      DC Plannin/13:  Went in and met with the pt, confirmed demographics.   Pt had [facility] home care prior to this admission for wound care. He would like to resume with [facility] home care upon dc.   Referral sent to [facility] home care. [facility] confirmed that they were previously seeing this pt and they will continue upon dc.       MARQUEZ ENRIQUEZ

## 2024-02-12 NOTE — PROGRESS NOTES
Subjective Data:  Patient was seen and examined on bedside. He seem to be irritated when asked about leg elevation. Pt was sitting at the side of the bed with leg on the ground. Pt stated he is frustrated that that we keep asking him to elevate his legs. He reported this is the first time he has seen his legs without edema and disagrees with our assessment of said edema on his legs and feet. He said elevation causes him pain but is trying. Pt was educated the reasons why leg elevation is so important to control edema and help with wound healing. He was also updated on the care plan as vascular wound expert Dr. Marquez needs to continue to assess wound in the next couple of days. Pt was also informed that he need to follow up with Dr. Marquez as an outpatient and he vocally expressed his irritation about travel time to the clinics.     Overnight Events:    No overnight events.     Objective Data:  Last Recorded Vitals:  Vitals:    02/11/24 1929 02/12/24 0033 02/12/24 0811 02/12/24 1119   BP: 109/64 149/77 127/75 169/76   Pulse: 100 84 77 92   Resp: 19 20 20 20   Temp: 36.7 °C (98.1 °F) 36.9 °C (98.4 °F) 36.7 °C (98.1 °F) 37 °C (98.6 °F)   TempSrc:       SpO2: 97% 94% 96% 92%   Weight:       Height:           Last Labs:  CBC - 2/12/2024:  8:27 AM  9.7 11.8 328    35.2      CMP - 2/12/2024:  8:27 AM  8.6 7.2 10 --- 0.3   3.5 2.9 6 77          HGBA1C   Date/Time Value Ref Range Status   02/09/2024 07:10 PM 7.1 see below % Final   07/06/2022 02:30 PM 6.2 4.3 - 5.6 % Final     Comment:     American Diabetes Association guidelines indicate that patients with HgbA1c in the range 5.7-6.4% are at increased risk for development of diabetes, and intervention by lifestyle modification may be beneficial. HgbA1c greater or equal to 6.5% is considered diagnostic of diabetes.   03/17/2022 02:34 PM 6.3 4.3 - 5.6 % Final     Comment:     American Diabetes Association guidelines indicate that patients with HgbA1c in the range 5.7-6.4%  are at increased risk for development of diabetes, and intervention by lifestyle modification may be beneficial. HgbA1c greater or equal to 6.5% is considered diagnostic of diabetes.      Last I/O:  I/O last 3 completed shifts:  In: - (0 mL/kg)   Out: 575 (4.3 mL/kg) [Urine:575 (0.1 mL/kg/hr)]  Weight: 133 kg     Past Cardiology Tests (Last 3 Years):  EKG:  Electrocardiogram 12 Lead 02/09/2024 (Preliminary)      Inpatient Medications:  Scheduled medications   Medication Dose Route Frequency    amoxicillin-pot clavulanate  1 tablet oral BID    aspirin  81 mg oral Daily    atorvastatin  40 mg oral Daily    cilostazol  50 mg oral BID    clopidogrel  75 mg oral Daily    glipiZIDE  5 mg oral BID    insulin lispro  0-5 Units subcutaneous TID with meals    losartan  50 mg oral Daily    nicotine  1 patch transdermal Daily    Followed by    [START ON 3/22/2024] nicotine  1 patch transdermal Daily    Followed by    [START ON 4/5/2024] nicotine  1 patch transdermal Daily    rivaroxaban  2.5 mg oral BID with meals     PRN medications   Medication    acetaminophen    dextrose 10 % in water (D10W)    dextrose    glucagon    HYDROmorphone    lidocaine    oxyCODONE    white petrolatum     Continuous Medications   Medication Dose Last Rate       Physical Exam:  General: Morbidly obese, NAD,   Neck: no JVD, no carotid bruits  Heart: S1, S2, no MRG, RRR  Lungs: CTA b/l, BUSTILLO  Abdomen: soft and non tender, +BS and BM  Legs: COLLAZO, +2 BLE edema, Right Ankle and LLE wounds covered with dry dressing intact Neuro: non focal.  Psych: Pt appears irritate and in denial about his current medical condition.         Assessment/Plan   54 YO M with Pmhx of DM2, smoker, HTN, HLD, CAD s/p CABG, HF, PAD s/p left and right iliac stentings, chronic venous dermatitis presents with extensive wounds on left leg and large wound on right leg with low EDIN/TBI, concerning for eileen class VI (CLTI).     Pt was seen by Dr Boone on 1/22/2024. The plan was  to refer him to vascular surgery. However after discussion with Dr. Godinez, at this time, patient does not have a great option for treatment of his aortic lesions, as open surgery would likely prove complicated and highly risky therefore on 2/9/24 he presented for Endovascular revascularization with Dr Boone.      #CLI/PAD, #chronic venous stasis dermatitis   #Bilateral lower extremity wounds, chronic non-healing  #Current every day smoker  - wound culture 1/30 podiatry clinic: 2+ few polymorphonuclear leukocytes, moderate mixed gram positive and gram negative bacteria, on Augmentin, 2 wk treatment ended 2/11/24  - 2/9/24 s/p PTA/DCB of left CFA/SFA/popliteal, C/b distal embolization. S/p polypharmacy, PTA and aspiration thrombectomy of Peroneal, PT(glass collateral) and AT(transcollateral).  - continued with ASA, started Plavix and Xarelto 2.5 mg PO BID post procedure (Rx for meds to bed sent, pt was also referred to specialty pharmacy for Xarelto assistance program  - Started Cilostazol 50 mg BID  - started Nicotine patch (Rx  sent to pt's pharmacy)  - pain control with oxy, Dilaudid, Tylenol PRN, and Lidocaine topical cream to the wounds  - leg elevation and compression for aggressive edema control  - wound care nurse consult for dressing changes.   - Dressing - Duoderm gel to the  base of the ulcers with ABD change daily  Heavy aquaphor to the remaining skin daily  - Dr Marquez consult for wound management- he was seen and assessed. She will see him again tomorrow 2/13/24.  - 1 month OP FU with EV clinic  - OP FU with Dr. Marquez TBD     #T2DM  - A1c 7.1 (2/9/24)  -holding metformin,   -mild ISS  - carb control diet 60 gm, Prostat BID for complete protein and added Kp BID to promote wound healing      #HDL   #CAD status post CABG X3  #HTN  #CHF  - continue with current home medications       Dispo  - Home with HC. Pt to resume current HC with [facility]   NOK - Brother Balwinder Stokes     Peripheral IV  02/09/24 20 G Right;Dorsal Forearm (Active)   Site Assessment Dry;Clean 02/11/24 0840   Dressing Status Dry;Clean 02/11/24 0840   Number of days: 2       Code Status:  Full Code            Mahadr Edgar, APRN-CNP  Endovascular/Limb Salvage Service   Day: 02797/Haiku/Night [facility] [id number]

## 2024-02-12 NOTE — CARE PLAN
Problem: Pain  Goal: My pain/discomfort is manageable  Outcome: Progressing     Problem: Safety  Goal: Patient will be injury free during hospitalization  Outcome: Progressing  Goal: I will remain free of falls  Outcome: Progressing     Problem: Daily Care  Goal: Daily care needs are met  Outcome: Progressing     Problem: Psychosocial Needs  Goal: Demonstrates ability to cope with hospitalization/illness  Outcome: Progressing  Goal: Collaborate with me, my family, and caregiver to identify my specific goals  Outcome: Progressing     Problem: Discharge Barriers  Goal: My discharge needs are met  Outcome: Progressing     Problem: Skin  Goal: Decreased wound size/increased tissue granulation at next dressing change  Outcome: Progressing  Goal: Participates in plan/prevention/treatment measures  Outcome: Progressing  Goal: Prevent/manage excess moisture  Outcome: Progressing  Goal: Prevent/minimize sheer/friction injuries  Outcome: Progressing  Goal: Promote/optimize nutrition  Outcome: Progressing  Goal: Promote skin healing  Outcome: Progressing     Problem: Fall/Injury  Goal: Not fall by end of shift  Outcome: Progressing  Goal: Be free from injury by end of the shift  Outcome: Progressing  Goal: Verbalize understanding of personal risk factors for fall in the hospital  Outcome: Progressing  Goal: Verbalize understanding of risk factor reduction measures to prevent injury from fall in the home  Outcome: Progressing  Goal: Use assistive devices by end of the shift  Outcome: Progressing  Goal: Pace activities to prevent fatigue by end of the shift  Outcome: Progressing

## 2024-02-13 LAB
ANION GAP SERPL CALC-SCNC: 14 MMOL/L (ref 10–20)
ATRIAL RATE: 80 BPM
BUN SERPL-MCNC: 19 MG/DL (ref 6–23)
CALCIUM SERPL-MCNC: 8.9 MG/DL (ref 8.6–10.6)
CHLORIDE SERPL-SCNC: 101 MMOL/L (ref 98–107)
CO2 SERPL-SCNC: 22 MMOL/L (ref 21–32)
CREAT SERPL-MCNC: 0.69 MG/DL (ref 0.5–1.3)
EGFRCR SERPLBLD CKD-EPI 2021: >90 ML/MIN/1.73M*2
ERYTHROCYTE [DISTWIDTH] IN BLOOD BY AUTOMATED COUNT: 14.3 % (ref 11.5–14.5)
GLUCOSE BLD MANUAL STRIP-MCNC: 114 MG/DL (ref 74–99)
GLUCOSE BLD MANUAL STRIP-MCNC: 128 MG/DL (ref 74–99)
GLUCOSE BLD MANUAL STRIP-MCNC: 164 MG/DL (ref 74–99)
GLUCOSE BLD MANUAL STRIP-MCNC: 187 MG/DL (ref 74–99)
GLUCOSE SERPL-MCNC: 124 MG/DL (ref 74–99)
HCT VFR BLD AUTO: 34.4 % (ref 41–52)
HGB BLD-MCNC: 11.4 G/DL (ref 13.5–17.5)
MCH RBC QN AUTO: 28.2 PG (ref 26–34)
MCHC RBC AUTO-ENTMCNC: 33.1 G/DL (ref 32–36)
MCV RBC AUTO: 85 FL (ref 80–100)
NRBC BLD-RTO: 0 /100 WBCS (ref 0–0)
P AXIS: 50 DEGREES
P OFFSET: 150 MS
P ONSET: 80 MS
PLATELET # BLD AUTO: 366 X10*3/UL (ref 150–450)
POTASSIUM SERPL-SCNC: 4.2 MMOL/L (ref 3.5–5.3)
PR INTERVAL: 282 MS
Q ONSET: 221 MS
QRS COUNT: 13 BEATS
QRS DURATION: 90 MS
QT INTERVAL: 380 MS
QTC CALCULATION(BAZETT): 438 MS
QTC FREDERICIA: 418 MS
R AXIS: 51 DEGREES
RBC # BLD AUTO: 4.04 X10*6/UL (ref 4.5–5.9)
SODIUM SERPL-SCNC: 133 MMOL/L (ref 136–145)
T AXIS: 41 DEGREES
T OFFSET: 411 MS
VENTRICULAR RATE: 80 BPM
WBC # BLD AUTO: 9.1 X10*3/UL (ref 4.4–11.3)

## 2024-02-13 PROCEDURE — 80048 BASIC METABOLIC PNL TOTAL CA: CPT | Performed by: PHYSICIAN ASSISTANT

## 2024-02-13 PROCEDURE — 82947 ASSAY GLUCOSE BLOOD QUANT: CPT

## 2024-02-13 PROCEDURE — 2500000004 HC RX 250 GENERAL PHARMACY W/ HCPCS (ALT 636 FOR OP/ED): Performed by: PHYSICIAN ASSISTANT

## 2024-02-13 PROCEDURE — 99232 SBSQ HOSP IP/OBS MODERATE 35: CPT | Performed by: NURSE PRACTITIONER

## 2024-02-13 PROCEDURE — 1100000001 HC PRIVATE ROOM DAILY

## 2024-02-13 PROCEDURE — 2500000001 HC RX 250 WO HCPCS SELF ADMINISTERED DRUGS (ALT 637 FOR MEDICARE OP): Performed by: PHYSICIAN ASSISTANT

## 2024-02-13 PROCEDURE — RXMED WILLOW AMBULATORY MEDICATION CHARGE

## 2024-02-13 PROCEDURE — 99232 SBSQ HOSP IP/OBS MODERATE 35: CPT | Performed by: INTERNAL MEDICINE

## 2024-02-13 PROCEDURE — 36415 COLL VENOUS BLD VENIPUNCTURE: CPT | Performed by: PHYSICIAN ASSISTANT

## 2024-02-13 PROCEDURE — 85027 COMPLETE CBC AUTOMATED: CPT | Performed by: PHYSICIAN ASSISTANT

## 2024-02-13 RX ORDER — AMOXICILLIN AND CLAVULANATE POTASSIUM 875; 125 MG/1; MG/1
1 TABLET, FILM COATED ORAL 2 TIMES DAILY
Qty: 20 TABLET | Refills: 0 | Status: SHIPPED | OUTPATIENT
Start: 2024-02-13 | End: 2024-02-24

## 2024-02-13 RX ORDER — PETROLATUM 420 MG/G
1 OINTMENT TOPICAL DAILY
Qty: 100 G | Refills: 0 | Status: SHIPPED | OUTPATIENT
Start: 2024-02-13

## 2024-02-13 RX ORDER — CILOSTAZOL 50 MG/1
50 TABLET ORAL 2 TIMES DAILY
Qty: 180 TABLET | Refills: 3 | Status: SHIPPED | OUTPATIENT
Start: 2024-02-13 | End: 2025-02-12

## 2024-02-13 RX ADMIN — INSULIN LISPRO 1 UNITS: 100 INJECTION, SOLUTION INTRAVENOUS; SUBCUTANEOUS at 12:38

## 2024-02-13 RX ADMIN — ASPIRIN 81 MG: 81 TABLET, COATED ORAL at 08:49

## 2024-02-13 RX ADMIN — CILOSTAZOL 50 MG: 50 TABLET ORAL at 08:49

## 2024-02-13 RX ADMIN — OXYCODONE HYDROCHLORIDE 5 MG: 5 TABLET ORAL at 21:52

## 2024-02-13 RX ADMIN — CLOPIDOGREL BISULFATE 75 MG: 75 TABLET ORAL at 08:49

## 2024-02-13 RX ADMIN — GLIPIZIDE 5 MG: 5 TABLET ORAL at 20:32

## 2024-02-13 RX ADMIN — HYDROMORPHONE HYDROCHLORIDE 0.2 MG: 1 INJECTION, SOLUTION INTRAMUSCULAR; INTRAVENOUS; SUBCUTANEOUS at 16:21

## 2024-02-13 RX ADMIN — OXYCODONE HYDROCHLORIDE 5 MG: 5 TABLET ORAL at 12:34

## 2024-02-13 RX ADMIN — LOSARTAN POTASSIUM 50 MG: 50 TABLET, FILM COATED ORAL at 08:49

## 2024-02-13 RX ADMIN — RIVAROXABAN 2.5 MG: 2.5 TABLET, FILM COATED ORAL at 17:00

## 2024-02-13 RX ADMIN — AMOXICILLIN AND CLAVULANATE POTASSIUM 1 TABLET: 875; 125 TABLET, FILM COATED ORAL at 08:49

## 2024-02-13 RX ADMIN — ATORVASTATIN CALCIUM 40 MG: 40 TABLET, FILM COATED ORAL at 08:49

## 2024-02-13 RX ADMIN — CILOSTAZOL 50 MG: 50 TABLET ORAL at 20:32

## 2024-02-13 RX ADMIN — RIVAROXABAN 2.5 MG: 2.5 TABLET, FILM COATED ORAL at 08:49

## 2024-02-13 RX ADMIN — GLIPIZIDE 5 MG: 5 TABLET ORAL at 08:49

## 2024-02-13 RX ADMIN — AMOXICILLIN AND CLAVULANATE POTASSIUM 1 TABLET: 875; 125 TABLET, FILM COATED ORAL at 21:00

## 2024-02-13 ASSESSMENT — PAIN - FUNCTIONAL ASSESSMENT
PAIN_FUNCTIONAL_ASSESSMENT: 0-10

## 2024-02-13 ASSESSMENT — COGNITIVE AND FUNCTIONAL STATUS - GENERAL
HELP NEEDED FOR BATHING: A LITTLE
PERSONAL GROOMING: A LITTLE
STANDING UP FROM CHAIR USING ARMS: A LOT
TOILETING: A LITTLE
MOBILITY SCORE: 14
MOVING FROM LYING ON BACK TO SITTING ON SIDE OF FLAT BED WITH BEDRAILS: A LITTLE
DRESSING REGULAR LOWER BODY CLOTHING: A LITTLE
MOBILITY SCORE: 14
DAILY ACTIVITIY SCORE: 19
TURNING FROM BACK TO SIDE WHILE IN FLAT BAD: A LITTLE
CLIMB 3 TO 5 STEPS WITH RAILING: A LOT
WALKING IN HOSPITAL ROOM: A LOT
CLIMB 3 TO 5 STEPS WITH RAILING: A LOT
DRESSING REGULAR UPPER BODY CLOTHING: A LITTLE
STANDING UP FROM CHAIR USING ARMS: A LOT
TURNING FROM BACK TO SIDE WHILE IN FLAT BAD: A LITTLE
DAILY ACTIVITIY SCORE: 19
PERSONAL GROOMING: A LITTLE
MOVING TO AND FROM BED TO CHAIR: A LOT
DRESSING REGULAR LOWER BODY CLOTHING: A LITTLE
WALKING IN HOSPITAL ROOM: A LOT
TOILETING: A LITTLE
MOVING FROM LYING ON BACK TO SITTING ON SIDE OF FLAT BED WITH BEDRAILS: A LITTLE
MOVING TO AND FROM BED TO CHAIR: A LOT
HELP NEEDED FOR BATHING: A LITTLE
DRESSING REGULAR UPPER BODY CLOTHING: A LITTLE

## 2024-02-13 ASSESSMENT — PAIN SCALES - GENERAL
PAINLEVEL_OUTOF10: 6
PAINLEVEL_OUTOF10: 0 - NO PAIN
PAINLEVEL_OUTOF10: 4
PAINLEVEL_OUTOF10: 3
PAINLEVEL_OUTOF10: 7

## 2024-02-13 NOTE — CARE PLAN
The patient's goals for the shift include      The clinical goals for the shift include Patient will remain HDS throughout shift  Problem: Pain  Goal: My pain/discomfort is manageable  Outcome: Progressing     Problem: Safety  Goal: Patient will be injury free during hospitalization  Outcome: Progressing  Goal: I will remain free of falls  Outcome: Progressing     Problem: Daily Care  Goal: Daily care needs are met  Outcome: Progressing     Problem: Psychosocial Needs  Goal: Demonstrates ability to cope with hospitalization/illness  Outcome: Progressing  Goal: Collaborate with me, my family, and caregiver to identify my specific goals  Outcome: Progressing     Problem: Discharge Barriers  Goal: My discharge needs are met  Outcome: Progressing     Problem: Skin  Goal: Decreased wound size/increased tissue granulation at next dressing change  Outcome: Progressing  Goal: Participates in plan/prevention/treatment measures  Outcome: Progressing  Goal: Prevent/manage excess moisture  Outcome: Progressing  Goal: Prevent/minimize sheer/friction injuries  Outcome: Progressing  Goal: Promote/optimize nutrition  Outcome: Progressing  Goal: Promote skin healing  Outcome: Progressing     Problem: Pain  Goal: Takes deep breaths with improved pain control throughout the shift  Outcome: Progressing  Goal: Turns in bed with improved pain control throughout the shift  Outcome: Progressing  Goal: Walks with improved pain control throughout the shift  Outcome: Progressing  Goal: Performs ADL's with improved pain control throughout shift  Outcome: Progressing  Goal: Participates in PT with improved pain control throughout the shift  Outcome: Progressing  Goal: Free from opioid side effects throughout the shift  Outcome: Progressing  Goal: Free from acute confusion related to pain meds throughout the shift  Outcome: Progressing     Problem: Fall/Injury  Goal: Not fall by end of shift  Outcome: Progressing  Goal: Be free from injury by  end of the shift  Outcome: Progressing  Goal: Verbalize understanding of personal risk factors for fall in the hospital  Outcome: Progressing  Goal: Verbalize understanding of risk factor reduction measures to prevent injury from fall in the home  Outcome: Progressing  Goal: Use assistive devices by end of the shift  Outcome: Progressing  Goal: Pace activities to prevent fatigue by end of the shift  Outcome: Progressing

## 2024-02-13 NOTE — PROGRESS NOTES
Nutrition Progress Note    Noted order for Kp (2/12). Pt previously started on Prostat AWC which is very similar product. Met with pt. He doesn't like the Prostat AWC and hasn't received Kp yet. Will stop Prostat AWC and continue Kp. RDN will follow up with pt to assess acceptance of Kp. Pt encouraged to drink it and discussed importance in wound healing.

## 2024-02-13 NOTE — PROGRESS NOTES
Subjective Data:  Patient was seen and examined on bedside. Pt in bd with legs semi-elevated. He still seem to be irritated when answering questions. He is complaining about the length of stay and wanting to go home.  Reinforced leg elevation to control edema and help with wound healing. He was also updated on the care plan as vascular wound expert Dr. Marquez needs to continue to assess wound to day to get final recommendation.     Overnight Events:    No overnight events.     Objective Data:  Last Recorded Vitals:  Vitals:    02/13/24 0725 02/13/24 1110 02/13/24 1438 02/13/24 1447   BP: 151/50 145/82 (!) 133/35 120/65   BP Location:   Right arm    Pulse: 79 87 77 83   Resp: 20 18 18    Temp: 37 °C (98.6 °F) 36.9 °C (98.4 °F) 37.6 °C (99.7 °F)    TempSrc:   Temporal    SpO2: 95% 92% 97%    Weight:       Height:           Last Labs:  CBC - 2/13/2024:  9:17 AM  9.1 11.4 366    34.4      CMP - 2/13/2024:  9:17 AM  8.9 7.2 10 --- 0.3   3.5 2.9 6 77          HGBA1C   Date/Time Value Ref Range Status   02/09/2024 07:10 PM 7.1 see below % Final   07/06/2022 02:30 PM 6.2 4.3 - 5.6 % Final     Comment:     American Diabetes Association guidelines indicate that patients with HgbA1c in the range 5.7-6.4% are at increased risk for development of diabetes, and intervention by lifestyle modification may be beneficial. HgbA1c greater or equal to 6.5% is considered diagnostic of diabetes.   03/17/2022 02:34 PM 6.3 4.3 - 5.6 % Final     Comment:     American Diabetes Association guidelines indicate that patients with HgbA1c in the range 5.7-6.4% are at increased risk for development of diabetes, and intervention by lifestyle modification may be beneficial. HgbA1c greater or equal to 6.5% is considered diagnostic of diabetes.      Last I/O:  I/O last 3 completed shifts:  In: 480 (3.6 mL/kg) [P.O.:480]  Out: 1450 (10.9 mL/kg) [Urine:1450 (0.3 mL/kg/hr)]  Weight: 133 kg     Past Cardiology Tests (Last 3 Years):  EKG:  Electrocardiogram  12 Lead 02/09/2024 (Preliminary)      Inpatient Medications:  Scheduled medications   Medication Dose Route Frequency    amoxicillin-pot clavulanate  1 tablet oral BID    aspirin  81 mg oral Daily    atorvastatin  40 mg oral Daily    cilostazol  50 mg oral BID    clopidogrel  75 mg oral Daily    glipiZIDE  5 mg oral BID    insulin lispro  0-5 Units subcutaneous TID with meals    losartan  50 mg oral Daily    nicotine  1 patch transdermal Daily    Followed by    [START ON 3/22/2024] nicotine  1 patch transdermal Daily    Followed by    [START ON 4/5/2024] nicotine  1 patch transdermal Daily    rivaroxaban  2.5 mg oral BID with meals     PRN medications   Medication    acetaminophen    dextrose 10 % in water (D10W)    dextrose    glucagon    HYDROmorphone    lidocaine    oxyCODONE    white petrolatum     Continuous Medications   Medication Dose Last Rate       Physical Exam:  General: Morbidly obese, NAD,   Neck: no JVD, no carotid bruits  Heart: S1, S2, no MRG, RRR  Lungs: CTA b/l, BUSTILLO  Abdomen: soft and non tender, +BS and BM  Legs: COLLAZO, +2 BLE edema, Right Ankle and LLE wounds covered with dry dressing intact Neuro: non focal.  Psych: Pt appears irritate and in denial about his current medical condition.         Assessment/Plan   52 YO M with Pmhx of DM2, smoker, HTN, HLD, CAD s/p CABG, HF, PAD s/p left and right iliac stentings, chronic venous dermatitis presents with extensive wounds on left leg and large wound on right leg with low EDIN/TBI, concerning for eileen class VI (CLTI).  Pt was seen by Dr Boone on 1/22/2024. The plan was to refer him to vascular surgery. However after discussion with Dr. Godinez, at this time, patient does not have a great option for treatment of his aortic lesions, as open surgery would likely prove complicated and highly risky therefore on 2/9/24 he presented for Endovascular revascularization with Dr Boone.      #CLI/PAD, #chronic venous stasis dermatitis   #Bilateral lower  extremity wounds, chronic non-healing  #Current every day smoker  - wound culture 1/30 podiatry clinic: 2+ few polymorphonuclear leukocytes, moderate mixed gram positive and gram negative bacteria, on Augmentin, 2 wk treatment ended 2/11/24  - 2/9/24 s/p PTA/DCB of left CFA/SFA/popliteal, C/b distal embolization. S/p polypharmacy, PTA and aspiration thrombectomy of Peroneal, PT(glass collateral) and AT(transcollateral).  - continued with ASA, started Plavix and Xarelto 2.5 mg PO BID post procedure (Rx for meds to bed sent, pt was also referred to specialty pharmacy for Xarelto assistance program  - Started Cilostazol 50 mg BID  - started Nicotine patch (Rx  sent to pt's pharmacy)  - pain control with oxy, Dilaudid, Tylenol PRN, and Lidocaine topical cream to the wounds  - leg elevation and compression for aggressive edema control  - wound care nurse consult for dressing changes.   - Dressing - Duoderm gel to the  base of the ulcers with ABD change daily  Heavy aquaphor to the remaining skin daily  - Dr Marquez consult for wound management- he was seen and assessed. She will see him again this afternoon  - 1 month OP FU with EVLS clinic  - OP FU with Dr. Marquez TBD     #T2DM  - A1c 7.1 (2/9/24)  -holding metformin,   -mild ISS  - carb control diet 60 gm, Prostat BID for complete protein and added Kp BID to promote wound healing      #HDL   #CAD status post CABG X3  #HTN  #CHF  - continue with current home medications       Dispo  - Home with HC. Pt to resume current HC with [facility]   NOK - Brother Balwinder Stokes       Peripheral IV 02/09/24 20 G Right;Dorsal Forearm (Active)   Site Assessment Dry;Clean 02/11/24 0840   Dressing Status Dry;Clean 02/11/24 0840   Number of days: 2       Code Status:  Full Code            Mahadr Edgar, APRN-CNP  Endovascular/Limb Salvage Service   Day: 15223/Haiku/Night [facility] [id number]

## 2024-02-13 NOTE — CARE PLAN
Problem: Pain  Goal: My pain/discomfort is manageable  Outcome: Progressing     Problem: Safety  Goal: Patient will be injury free during hospitalization  Outcome: Progressing  Goal: I will remain free of falls  Outcome: Progressing     Problem: Daily Care  Goal: Daily care needs are met  Outcome: Progressing     Problem: Psychosocial Needs  Goal: Demonstrates ability to cope with hospitalization/illness  Outcome: Progressing  Goal: Collaborate with me, my family, and caregiver to identify my specific goals  Outcome: Progressing     Problem: Discharge Barriers  Goal: My discharge needs are met  Outcome: Progressing     Problem: Skin  Goal: Decreased wound size/increased tissue granulation at next dressing change  Outcome: Progressing  Goal: Participates in plan/prevention/treatment measures  Outcome: Progressing  Goal: Prevent/manage excess moisture  Outcome: Progressing  Goal: Prevent/minimize sheer/friction injuries  Outcome: Progressing  Goal: Promote/optimize nutrition  Outcome: Progressing  Goal: Promote skin healing  Outcome: Progressing     Problem: Pain  Goal: Takes deep breaths with improved pain control throughout the shift  Outcome: Progressing  Goal: Turns in bed with improved pain control throughout the shift  Outcome: Progressing  Goal: Walks with improved pain control throughout the shift  Outcome: Progressing  Goal: Performs ADL's with improved pain control throughout shift  Outcome: Progressing  Goal: Participates in PT with improved pain control throughout the shift  Outcome: Progressing  Goal: Free from opioid side effects throughout the shift  Outcome: Progressing  Goal: Free from acute confusion related to pain meds throughout the shift  Outcome: Progressing     Problem: Fall/Injury  Goal: Not fall by end of shift  Outcome: Progressing  Goal: Be free from injury by end of the shift  Outcome: Progressing  Goal: Verbalize understanding of personal risk factors for fall in the hospital  Outcome:  Progressing  Goal: Verbalize understanding of risk factor reduction measures to prevent injury from fall in the home  Outcome: Progressing  Goal: Use assistive devices by end of the shift  Outcome: Progressing  Goal: Pace activities to prevent fatigue by end of the shift  Outcome: Progressing

## 2024-02-13 NOTE — PROGRESS NOTES
"Subjective Data:  Reports his legs feel better  The duoderm gel is well tolerated  Overnight Events:    NA     Objective Data:  Last Recorded Vitals:  /65   Pulse 83   Temp 37.6 °C (99.7 °F) (Temporal)   Resp 18   Ht 1.803 m (5' 10.98\")   Wt 133 kg (293 lb 3.4 oz)   SpO2 97%   BMI 40.91 kg/m²       Last Labs:  Results from last 7 days   Lab Units 02/13/24  0917 02/12/24  0827 02/11/24  0843   WBC AUTO x10*3/uL 9.1 9.7 9.7   HEMOGLOBIN g/dL 11.4* 11.8* 11.3*   HEMATOCRIT % 34.4* 35.2* 35.4*   PLATELETS AUTO x10*3/uL 366 328 322       Results from last 7 days   Lab Units 02/13/24 0917 02/12/24 0827 02/11/24  0843   SODIUM mmol/L 133* 134* 132*   POTASSIUM mmol/L 4.2 4.3 4.1   CHLORIDE mmol/L 101 101 102   CO2 mmol/L 22 21 20*   BUN mg/dL 19 19 17   CREATININE mg/dL 0.69 0.81 0.73   GLUCOSE mg/dL 124* 111* 129*   CALCIUM mg/dL 8.9 8.6 8.7                        Last I/O:  No intake or output data in the 24 hours ending 02/13/24 1620     Inpatient Medications:  Scheduled medications  amoxicillin-pot clavulanate, 1 tablet, oral, BID  aspirin, 81 mg, oral, Daily  atorvastatin, 40 mg, oral, Daily  cilostazol, 50 mg, oral, BID  clopidogrel, 75 mg, oral, Daily  glipiZIDE, 5 mg, oral, BID  insulin lispro, 0-5 Units, subcutaneous, TID with meals  losartan, 50 mg, oral, Daily  nicotine, 1 patch, transdermal, Daily   Followed by  [START ON 3/22/2024] nicotine, 1 patch, transdermal, Daily   Followed by  [START ON 4/5/2024] nicotine, 1 patch, transdermal, Daily  rivaroxaban, 2.5 mg, oral, BID with meals      Continuous medications     PRN medications  PRN medications: acetaminophen, dextrose 10 % in water (D10W), dextrose, glucagon, HYDROmorphone, lidocaine, oxyCODONE, white petrolatum          Physical Exam:  OE:   Clinically stable  Swelling improved  +odor - urea noted  Ulcers fairly stable with retained blood products in the base  Toes 1/2 left deeply cyanotic     Assessment/Plan   CLTI and ambar LE " ulcers    REC:  Continue the duoderm gel - cover with adaptic and ABD change daily.  Double tubigrip for compression.   Elevate AMAP  Please start Augmentin 875 mg BID x 10 days for the presumed proteus    Will arrange wound clinic follow up for next week.

## 2024-02-14 ENCOUNTER — PHARMACY VISIT (OUTPATIENT)
Dept: PHARMACY | Facility: CLINIC | Age: 54
End: 2024-02-14
Payer: COMMERCIAL

## 2024-02-14 VITALS
HEIGHT: 71 IN | OXYGEN SATURATION: 96 % | WEIGHT: 293.21 LBS | HEART RATE: 83 BPM | DIASTOLIC BLOOD PRESSURE: 59 MMHG | SYSTOLIC BLOOD PRESSURE: 140 MMHG | TEMPERATURE: 97.9 F | RESPIRATION RATE: 18 BRPM | BODY MASS INDEX: 41.05 KG/M2

## 2024-02-14 LAB
ANION GAP SERPL CALC-SCNC: 15 MMOL/L (ref 10–20)
BUN SERPL-MCNC: 15 MG/DL (ref 6–23)
CALCIUM SERPL-MCNC: 9 MG/DL (ref 8.6–10.6)
CHLORIDE SERPL-SCNC: 100 MMOL/L (ref 98–107)
CO2 SERPL-SCNC: 22 MMOL/L (ref 21–32)
CREAT SERPL-MCNC: 0.66 MG/DL (ref 0.5–1.3)
EGFRCR SERPLBLD CKD-EPI 2021: >90 ML/MIN/1.73M*2
ERYTHROCYTE [DISTWIDTH] IN BLOOD BY AUTOMATED COUNT: 14.3 % (ref 11.5–14.5)
GLUCOSE BLD MANUAL STRIP-MCNC: 136 MG/DL (ref 74–99)
GLUCOSE BLD MANUAL STRIP-MCNC: 177 MG/DL (ref 74–99)
GLUCOSE SERPL-MCNC: 116 MG/DL (ref 74–99)
HCT VFR BLD AUTO: 34.8 % (ref 41–52)
HGB BLD-MCNC: 11 G/DL (ref 13.5–17.5)
MCH RBC QN AUTO: 27.5 PG (ref 26–34)
MCHC RBC AUTO-ENTMCNC: 31.6 G/DL (ref 32–36)
MCV RBC AUTO: 87 FL (ref 80–100)
NRBC BLD-RTO: 0 /100 WBCS (ref 0–0)
PLATELET # BLD AUTO: 395 X10*3/UL (ref 150–450)
POTASSIUM SERPL-SCNC: 4 MMOL/L (ref 3.5–5.3)
RBC # BLD AUTO: 4 X10*6/UL (ref 4.5–5.9)
SODIUM SERPL-SCNC: 133 MMOL/L (ref 136–145)
WBC # BLD AUTO: 8.6 X10*3/UL (ref 4.4–11.3)

## 2024-02-14 PROCEDURE — 2500000002 HC RX 250 W HCPCS SELF ADMINISTERED DRUGS (ALT 637 FOR MEDICARE OP, ALT 636 FOR OP/ED): Performed by: PHYSICIAN ASSISTANT

## 2024-02-14 PROCEDURE — 82947 ASSAY GLUCOSE BLOOD QUANT: CPT

## 2024-02-14 PROCEDURE — 97161 PT EVAL LOW COMPLEX 20 MIN: CPT | Mod: GP

## 2024-02-14 PROCEDURE — 2500000001 HC RX 250 WO HCPCS SELF ADMINISTERED DRUGS (ALT 637 FOR MEDICARE OP): Performed by: PHYSICIAN ASSISTANT

## 2024-02-14 PROCEDURE — 80048 BASIC METABOLIC PNL TOTAL CA: CPT | Performed by: NURSE PRACTITIONER

## 2024-02-14 PROCEDURE — 36415 COLL VENOUS BLD VENIPUNCTURE: CPT | Performed by: NURSE PRACTITIONER

## 2024-02-14 PROCEDURE — 2500000004 HC RX 250 GENERAL PHARMACY W/ HCPCS (ALT 636 FOR OP/ED): Performed by: PHYSICIAN ASSISTANT

## 2024-02-14 PROCEDURE — 99239 HOSP IP/OBS DSCHRG MGMT >30: CPT | Performed by: NURSE PRACTITIONER

## 2024-02-14 PROCEDURE — S4991 NICOTINE PATCH NONLEGEND: HCPCS | Performed by: PHYSICIAN ASSISTANT

## 2024-02-14 PROCEDURE — 85027 COMPLETE CBC AUTOMATED: CPT | Performed by: NURSE PRACTITIONER

## 2024-02-14 PROCEDURE — 97530 THERAPEUTIC ACTIVITIES: CPT | Mod: GP

## 2024-02-14 RX ADMIN — RIVAROXABAN 2.5 MG: 2.5 TABLET, FILM COATED ORAL at 09:29

## 2024-02-14 RX ADMIN — HYDROMORPHONE HYDROCHLORIDE 0.2 MG: 1 INJECTION, SOLUTION INTRAMUSCULAR; INTRAVENOUS; SUBCUTANEOUS at 13:40

## 2024-02-14 RX ADMIN — GLIPIZIDE 5 MG: 5 TABLET ORAL at 09:29

## 2024-02-14 RX ADMIN — LOSARTAN POTASSIUM 50 MG: 50 TABLET, FILM COATED ORAL at 09:29

## 2024-02-14 RX ADMIN — ATORVASTATIN CALCIUM 40 MG: 40 TABLET, FILM COATED ORAL at 09:29

## 2024-02-14 RX ADMIN — ASPIRIN 81 MG: 81 TABLET, COATED ORAL at 09:29

## 2024-02-14 RX ADMIN — NICOTINE 1 PATCH: 21 PATCH, EXTENDED RELEASE TRANSDERMAL at 09:29

## 2024-02-14 RX ADMIN — AMOXICILLIN AND CLAVULANATE POTASSIUM 1 TABLET: 875; 125 TABLET, FILM COATED ORAL at 09:29

## 2024-02-14 RX ADMIN — CLOPIDOGREL BISULFATE 75 MG: 75 TABLET ORAL at 09:29

## 2024-02-14 RX ADMIN — CILOSTAZOL 50 MG: 50 TABLET ORAL at 09:29

## 2024-02-14 RX ADMIN — OXYCODONE HYDROCHLORIDE 5 MG: 5 TABLET ORAL at 06:15

## 2024-02-14 ASSESSMENT — COGNITIVE AND FUNCTIONAL STATUS - GENERAL
CLIMB 3 TO 5 STEPS WITH RAILING: A LOT
CLIMB 3 TO 5 STEPS WITH RAILING: A LOT
STANDING UP FROM CHAIR USING ARMS: A LOT
HELP NEEDED FOR BATHING: A LITTLE
STANDING UP FROM CHAIR USING ARMS: A LITTLE
WALKING IN HOSPITAL ROOM: A LITTLE
MOVING FROM LYING ON BACK TO SITTING ON SIDE OF FLAT BED WITH BEDRAILS: A LITTLE
DRESSING REGULAR LOWER BODY CLOTHING: A LITTLE
MOVING TO AND FROM BED TO CHAIR: A LITTLE
STANDING UP FROM CHAIR USING ARMS: A LITTLE
DRESSING REGULAR LOWER BODY CLOTHING: A LITTLE
PERSONAL GROOMING: A LITTLE
DRESSING REGULAR UPPER BODY CLOTHING: A LITTLE
TOILETING: A LITTLE
TOILETING: A LITTLE
MOBILITY SCORE: 17
MOVING FROM LYING ON BACK TO SITTING ON SIDE OF FLAT BED WITH BEDRAILS: A LITTLE
MOVING TO AND FROM BED TO CHAIR: A LITTLE
MOBILITY SCORE: 17
TURNING FROM BACK TO SIDE WHILE IN FLAT BAD: A LITTLE
DAILY ACTIVITIY SCORE: 19
TURNING FROM BACK TO SIDE WHILE IN FLAT BAD: A LITTLE
TURNING FROM BACK TO SIDE WHILE IN FLAT BAD: A LITTLE
PERSONAL GROOMING: A LITTLE
DRESSING REGULAR UPPER BODY CLOTHING: A LITTLE
WALKING IN HOSPITAL ROOM: A LITTLE
MOVING FROM LYING ON BACK TO SITTING ON SIDE OF FLAT BED WITH BEDRAILS: A LITTLE
WALKING IN HOSPITAL ROOM: A LOT
HELP NEEDED FOR BATHING: A LITTLE
MOBILITY SCORE: 15
CLIMB 3 TO 5 STEPS WITH RAILING: A LOT
MOVING TO AND FROM BED TO CHAIR: A LITTLE
DAILY ACTIVITIY SCORE: 19

## 2024-02-14 ASSESSMENT — ACTIVITIES OF DAILY LIVING (ADL): ADL_ASSISTANCE: INDEPENDENT

## 2024-02-14 ASSESSMENT — PAIN SCALES - GENERAL
PAINLEVEL_OUTOF10: 6
PAINLEVEL_OUTOF10: 7
PAINLEVEL_OUTOF10: 3
PAINLEVEL_OUTOF10: 7

## 2024-02-14 ASSESSMENT — PAIN - FUNCTIONAL ASSESSMENT
PAIN_FUNCTIONAL_ASSESSMENT: 0-10

## 2024-02-14 NOTE — CARE PLAN
Closure device deployed in the right femoral artery.  The patient's goals for the shift include  decreased pain    The clinical goals for the shift include pt will remain HDS throughout the shift    Over the shift, the patient did make progress toward the following goals.

## 2024-02-14 NOTE — PROGRESS NOTES
Tim Stokes is a 53 y.o. male on day 2 of admission presenting with Atherosclerosis of both lower extremities with bilateral ulceration (CMS/HCC).      DC Plannin/14:   Pt going home today, .  Removed Wheeled Walker from [place] closet and gave to pt at bedside. Sent referral to [place].  [facility] home care notified of pt returning to care. PT and Wound care.     MARQUEZ ENRIQUEZ

## 2024-02-14 NOTE — PROGRESS NOTES
Physical Therapy    Physical Therapy Evaluation & Treatment    Patient Name: Tim Stokes  MRN: [id number]  Today's Date: 2/14/2024   Time Calculation  Start Time: 1058  Stop Time: 1203  Time Calculation (min): 65 min    Assessment/Plan   PT Assessment  PT Assessment Results: Decreased strength, Decreased range of motion, Decreased endurance, Impaired balance, Decreased mobility, Pain, Decreased skin integrity  Rehab Prognosis: Good  Barriers to Discharge: none  Evaluation/Treatment Tolerance: Patient limited by fatigue  Medical Staff Made Aware: Yes  Strengths: Attitude of self, Coping skills  Barriers to Participation:  (none)  End of Session Communication: Bedside nurse, Care Coordinator  Assessment Comment: The pt with a slightly unsteady gait using a wheeled walker, but safe for low intensity level therapy after d/c.  End of Session Patient Position: Bed, 2 rail up, Alarm off, not on at start of session   IP OR SWING BED PT PLAN  Inpatient or Swing Bed: Inpatient  PT Plan  Treatment/Interventions: Bed mobility, Transfer training, Gait training, Balance training, Strengthening, Endurance training, Range of motion, Therapeutic exercise, Therapeutic activity, Home exercise program  PT Plan: Skilled PT  PT Frequency: 5 times per week  PT Discharge Recommendations: Low intensity level of continued care  Equipment Recommended upon Discharge: Wheelchair  PT Recommended Transfer Status: Stand by assist  PT - OK to Discharge: Yes      Subjective     General Visit Information:  General  Reason for Referral: Johan LE critical limb ischemia  Past Medical History Relevant to Rehab: DM II, HTN, HLD, CAD s/p CABG '22, PAD, Left foot lateral rays amputation  Prior to Session Communication: Bedside nurse  Patient Position Received: Bed, 2 rail up, Alarm off, not on at start of session  Preferred Learning Style: verbal, visual, written  General Comment: The pt was pleasant, cooperative and willing to participate in therapy.  Home  Living:  Home Living  Type of Home: House  Lives With: Alone  Home Adaptive Equipment: Walker rolling or standard, Cane  Home Layout: Two level, Full bath main level, Stairs to alternate level with rails  Alternate Level Stairs-Rails: Left (stair lift)  Alternate Level Stairs-Number of Steps: 13  Home Access: Stairs to enter without rails, Ramped entrance  Entrance Stairs-Rails: None  Entrance Stairs-Number of Steps: 1  Bathroom Shower/Tub: Walk-in shower  Bathroom Toilet: Handicapped height, Adaptive toilet seating  Bathroom Equipment: Grab bars in shower, Built-in shower seat  Prior Level of Function:  Prior Function Per Pt/Caregiver Report  Level of [place]: Independent with ADLs and functional transfers, Independent with homemaking with ambulation  Receives Help From: Family  ADL Assistance: Independent  Homemaking Assistance: Independent  Ambulatory Assistance: Independent  Vocational: Full time employment  Leisure: drive  Hand Dominance: Ambidextrous  Prior Function Comments: The pt was independent with all mobility using a rollator or standard cane, intermittently as needed in the household and in the community.  Precautions:  Precautions  Hearing/Visual Limitations: Hearing and vision WFL with glasses  LE Weight Bearing Status: Weight Bearing as Tolerated (Johan LE)  Medical Precautions: Fall precautions  Precautions Comment: Pt in compliance with precautions throughout PT evaluation.  Vital Signs:  Vital Signs  Heart Rate: 78  Heart Rate Source: Monitor  SpO2: 97 % (RA)  BP: 140/59  Patient Position: Sitting    Objective   Pain:  Pain Assessment  Pain Assessment: 0-10  Pain Score: 6  Pain Type: Acute pain  Pain Location: Leg  Pain Orientation: Right, Left  Cognition:  Cognition  Overall Cognitive Status: Within Functional Limits  Orientation Level: Oriented X4    General Assessments:  General Observation  General Observation: Pt presented with generalized weakness and R LE length descrepency.    Activity  Tolerance  Endurance: Decreased tolerance for upright activites    Strength  Strength Comments: Johan LE decreased strength  Coordination  Movements are Fluid and Coordinated: Yes    Postural Control  Postural Control: Within Functional Limits  Posture Comment: Pt presented with good sitting and standing posture using a wheeled walker.    Static Sitting Balance  Static Sitting-Balance Support: Bilateral upper extremity supported, Feet supported  Static Sitting-Level of Assistance: Independent  Dynamic Sitting Balance  Dynamic Sitting-Balance Support: Bilateral upper extremity supported, Feet supported  Dynamic Sitting-Comments: independent    Static Standing Balance  Static Standing-Balance Support: Bilateral upper extremity supported  Static Standing-Level of Assistance: Close supervision  Static Standing-Comment/Number of Minutes: using a wheeled walker  Dynamic Standing Balance  Dynamic Standing-Balance Support: Bilateral upper extremity supported  Dynamic Standing-Comments: SBA using a wheeled walker  Functional Assessments:  Transfers  Transfer: Yes  Transfer 1  Transfer From 1: Sit to  Transfer to 1: Stand  Transfer Device 1: Walker  Transfer Level of Assistance 1: Close supervision  Transfers 2  Transfer From 2: Stand to  Transfer to 2: Sit  Transfer Device 2: Walker  Transfer Level of Assistance 2: Close supervision    Ambulation/Gait Training  Ambulation/Gait Training Performed: Yes  Ambulation/Gait Training 1  Surface 1: Level tile  Device 1: Rolling walker  Assistance 1: Close supervision  Quality of Gait 1: Decreased step length, Soft knee(s) (slightly unsteady, decreased keanu, decreased endurance)  Comments/Distance (ft) 1: 15ft  Extremity/Trunk Assessments:  Cervical Spine   Cervical Spine: Within Functional Limits  Lumbar Spine   Lumbar Spine : Within Functional Limits    RUE   RUE : Within Functional Limits  LUE   LUE: Within Functional Limits  RLE   RLE : Exceptions to WFL  AROM RLE (degrees)  RLE  AROM Comment: decreased knee extension  Strength RLE  R Hip Flexion: 4/5  R Knee Flexion: 4+/5  R Knee Extension: 4+/5  R Ankle Dorsiflexion: 4/5  R Ankle Plantar Flexion: 4/5  LLE   LLE : Exceptions to WFL  AROM LLE (degrees)  LLE AROM Comment: WFL  Strength LLE  L Hip Flexion: 4/5  L Knee Flexion: 4/5  L Knee Extension: 4/5  L Ankle Dorsiflexion: 4-/5  L Ankle Plantar Flexion: 4-/5  Treatments:     Ambulation/Gait Training  Ambulation/Gait Training Performed: Yes  Ambulation/Gait Training 1  Surface 1: Level tile  Device 1: Rolling walker  Assistance 1: Close supervision  Quality of Gait 1: Decreased step length, Soft knee(s) (slightly unsteady, decreased keanu, decreased endurance)  Comments/Distance (ft) 1: 15ft  Transfers  Transfer: Yes  Transfer 1  Transfer From 1: Sit to  Transfer to 1: Stand  Transfer Device 1: Walker  Transfer Level of Assistance 1: Close supervision  Transfers 2  Transfer From 2: Stand to  Transfer to 2: Sit  Transfer Device 2: Walker  Transfer Level of Assistance 2: Close supervision  Outcome Measures:  [facility] Basic Mobility  Turning from your back to your side while in a flat bed without using bedrails: A little  Moving from lying on your back to sitting on the side of a flat bed without using bedrails: A little  Moving to and from bed to chair (including a wheelchair): A little  Standing up from a chair using your arms (e.g. wheelchair or bedside chair): A little  To walk in hospital room: A little  Climbing 3-5 steps with railing: A lot  Basic Mobility - Total Score: 17    Encounter Problems       Encounter Problems (Active)       Balance       STG - Maintains independent dynamic standing balance with upper extremity support using a wheeled walker. (Progressing)       Start:  02/14/24    Expected End:  02/28/24            STG - Maintains independent static standing balance with upper extremity support using a wheeled walker. (Progressing)       Start:  02/14/24    Expected End:   02/28/24               Mobility       STG - Patient will ambulate 125ft using a wheeled walker, independently. (Progressing)       Start:  02/14/24    Expected End:  02/28/24               Transfers       STG - Transfer from bed to chair using a wheeled walker, independently. (Progressing)       Start:  02/14/24    Expected End:  02/28/24            STG - Patient to transfer to and from sit to supine, independently. (Progressing)       Start:  02/14/24    Expected End:  02/28/24            STG - Patient will transfer sit to and from stand using a wheeled walker, independently. (Progressing)       Start:  02/14/24    Expected End:  02/28/24                   Education Documentation  Body Mechanics, taught by José Miguel Sheffield, PT at 2/14/2024 12:44 PM.  Learner: Patient  Readiness: Acceptance  Method: Explanation, Demonstration  Response: Verbalizes Understanding, Demonstrated Understanding    Home Exercise Program, taught by José Miguel Sheffield, PT at 2/14/2024 12:44 PM.  Learner: Patient  Readiness: Acceptance  Method: Explanation, Demonstration  Response: Verbalizes Understanding, Demonstrated Understanding    Mobility Training, taught by José Miguel Sheffield, PT at 2/14/2024 12:44 PM.  Learner: Patient  Readiness: Acceptance  Method: Explanation, Demonstration  Response: Verbalizes Understanding, Demonstrated Understanding    Education Comments  No comments found.

## 2024-02-14 NOTE — DISCHARGE SUMMARY
Discharge Diagnosis  Atherosclerosis of both lower extremities with bilateral ulceration (CMS/HCC)    Issues Requiring Follow-Up  Bilateral LE wound care - FU scheduled for 2/19/24     Test Results Pending At Discharge  Pending Labs       No current pending labs.          Lab Results   Component Value Date    WBC 8.6 02/14/2024    HGB 11.0 (L) 02/14/2024    HCT 34.8 (L) 02/14/2024    MCV 87 02/14/2024     02/14/2024     Lab Results   Component Value Date    GLUCOSE 116 (H) 02/14/2024    CALCIUM 9.0 02/14/2024     (L) 02/14/2024    K 4.0 02/14/2024    CO2 22 02/14/2024     02/14/2024    BUN 15 02/14/2024    CREATININE 0.66 02/14/2024 2/13/2024     7:58 PM 2/13/2024     8:01 PM 2/13/2024    11:48 PM 2/14/2024     4:06 AM 2/14/2024     7:36 AM 2/14/2024    10:58 AM 2/14/2024    11:26 AM   Vitals   Systolic 125 137 142 128 125 140 140   Diastolic 51 54 52 70 71 59 59   Heart Rate 87 90 72 92 84 78 83   Temp 38.3 °C (100.9 °F) 38.3 °C (100.9 °F) 37.3 °C (99.1 °F) 37.1 °C (98.8 °F) 37.3 °C (99.1 °F)  36.6 °C (97.9 °F)   Resp 21  20 20 18  18       Hospital Course  52 YO M with Pmhx of DM2, smoker, HTN, HLD, CAD s/p CABG, HF, PAD s/p left and right iliac stentings, chronic venous dermatitis presents with extensive wounds on left leg and large wound on right leg with low EDIN/TBI, concerning for eileen class VI (CLTI).  Pt was seen by Dr Boone on 1/22/2024. The plan was to refer him to vascular surgery. However after discussion with Dr. Godinez, at this time, patient does not have a great option for treatment of his aortic lesions, as open surgery would likely prove complicated and highly risky therefore on 2/9/24 he presented for Endovascular revascularization with Dr Boone.   #CLI/PAD, #chronic venous stasis dermatitis   #Bilateral lower extremity wounds, chronic non-healing  #Current every day smoker  - wound culture 1/30 podiatry clinic: 2+ few polymorphonuclear leukocytes, moderate  mixed gram positive and gram negative bacteria, on Augmentin, 2 wk treatment ended 2/11/24  - 2/9/24 s/p PTA/DCB of left CFA/SFA/popliteal, C/b distal embolization. S/p polypharmacy, PTA and aspiration thrombectomy of Peroneal, PT(glass collateral) and AT(transcollateral).  - continued with ASA, started Plavix and Xarelto 2.5 mg PO BID post procedure (Rx for meds to bed sent, pt was also referred to specialty pharmacy for Xarelto assistance program  - Started Cilostazol 50 mg BID  - started Nicotine patch (Rx  sent to pt's pharmacy)  - pain control with oxy, Dilaudid, Tylenol PRN, and Lidocaine topical cream to the wounds  - leg elevation and compression for aggressive edema control  - Dr Marquez consult for wound management- he was seen and assessed by her during his stay. Last seen 2/13/2024.  - Dressing - Duoderm gel to the base of the ulcers, adaptic, ABD cover with kerlix and applu double Tubigrip daily  Heavy aquaphor to the remaining skin daily  - 1 month OP FU with EVLS clinic  - OP FU with Dr. Marquez 2/19/24  -#T2DM  - A1c 7.1 (2/9/24)  -continue with Jardiance, Glucophage, Ozempic, metformin at discharge  -mild ISS during hospital stay.   - carb control diet 60 gm, Prostat BID for complete protein and added Kp BID to promote wound healing   #HDL   #CAD status post CABG X3  #HTN  #CHF  - continue with current home medications  Dispo  - Home with HC. Pt to resume current HC with [facility] for wound care and PT  NOK - Brother Balwinder Stokes     Pertinent Physical Exam At Time of Discharge  Physical Exam  Constitutional:       Appearance: He is obese.   HENT:      Head: Normocephalic and atraumatic.      Mouth/Throat:      Mouth: Mucous membranes are moist.   Eyes:      Pupils: Pupils are equal, round, and reactive to light.   Cardiovascular:      Rate and Rhythm: Normal rate and regular rhythm.      Heart sounds: Normal heart sounds.   Pulmonary:      Breath sounds: Examination of the right-lower field  reveals decreased breath sounds. Examination of the left-lower field reveals decreased breath sounds. Decreased breath sounds present.   Abdominal:      General: Bowel sounds are normal.      Palpations: Abdomen is soft.   Musculoskeletal:      Cervical back: Normal range of motion and neck supple.      Right lower le+ Pitting Edema present.      Left lower le+ Pitting Edema present.   Skin:     Findings: Wound present.             Comments: RLE: wound around the medial ankle   LLE: Necrotic and slough mixed wound that is malodors   Neurological:      Mental Status: He is alert and oriented to person, place, and time.   Psychiatric:         Mood and Affect: Mood is depressed. Affect is labile.                     Discussed with Pt extensively about the severity of the wounds and the unlikelihood of salvaging the left leg. Patient refused to have discussion about possibility of amputation. Discussed with Pt's caregivers sister-in law Laura and Brother Balwinder about our recommendation of above the knee amputation and getting vascular surgery involved. Pt and family understood the risks and benefits of our recommendation including wound infection and sepsis. At this time patient would like to go home and continue with wound care and see how the wound turns our in a few days. Pt was sent home with triple therapy on Xarelto, Plavix and ASA. He was also started on Cilostazol 50 mg BID and Augmentin BID for 10 days. Pt left the hospital with at least 30 day supply of these medications. Patient and family advised when in case they decided on amputation to contact us so we can assist arrange with vascular surgery.      After all labs and VS were reviewed the decision was made that the patient was medically stable for discharge. The patient was discharged home in satisfactory condition with home care for wound care and physical therapy.     I personally spent > 30 minutes with this patient, of which >50% of time was  spent counseling and coordination of care.        Home Medications     Medication List      START taking these medications     cilostazol 50 mg tablet; Commonly known as: Pletal; Take 1 tablet (50   mg) by mouth 2 times a day.   clopidogrel 75 mg tablet; Commonly known as: Plavix; Take 1 tablet (75   mg) by mouth once daily.   white petrolatum 41 % ointment ointment; Commonly known as: Aquaphor;   Apply 1 Application topically once daily.   Xarelto 2.5 mg tablet; Generic drug: rivaroxaban; Take 1 tablet (2.5 mg)   by mouth 2 times a day.     CONTINUE taking these medications     amoxicillin-pot clavulanate 875-125 mg tablet; Commonly known as:   Augmentin; Take 1 tablet by mouth 2 times a day for 10 days.   aspirin 81 mg EC tablet   atorvastatin 40 mg tablet; Commonly known as: Lipitor   empagliflozin 25 mg; Commonly known as: Jardiance   glipiZIDE 5 mg tablet; Commonly known as: Glucotrol   losartan 50 mg tablet; Commonly known as: Cozaar   metFORMIN 500 mg tablet; Commonly known as: Glucophage   semaglutide 1 mg/dose (4 mg/3 mL) pen injector; Commonly known as:   OZEMPIC       Outpatient Follow-Up  Future Appointments   Date Time Provider Department Center   2/19/2024 10:00 AM Maegan Marquez MD [id number]

## 2024-02-19 ENCOUNTER — OFFICE VISIT (OUTPATIENT)
Dept: WOUND CARE | Facility: CLINIC | Age: 54
End: 2024-02-19
Payer: COMMERCIAL

## 2024-02-19 VITALS
HEART RATE: 87 BPM | BODY MASS INDEX: 40.6 KG/M2 | SYSTOLIC BLOOD PRESSURE: 138 MMHG | DIASTOLIC BLOOD PRESSURE: 78 MMHG | WEIGHT: 290 LBS | RESPIRATION RATE: 18 BRPM | HEIGHT: 71 IN

## 2024-02-19 DIAGNOSIS — I96 GANGRENE OF TOE OF LEFT FOOT (MULTI): ICD-10-CM

## 2024-02-19 DIAGNOSIS — L97.212: ICD-10-CM

## 2024-02-19 DIAGNOSIS — L97.222 CHRONIC ULCER OF LEFT CALF WITH FAT LAYER EXPOSED (MULTI): Primary | ICD-10-CM

## 2024-02-19 DIAGNOSIS — I70.223 CRITICAL LIMB ISCHEMIA OF BOTH LOWER EXTREMITIES (MULTI): ICD-10-CM

## 2024-02-19 DIAGNOSIS — L97.523: ICD-10-CM

## 2024-02-19 PROCEDURE — 11045 DBRDMT SUBQ TISS EACH ADDL: CPT | Performed by: INTERNAL MEDICINE

## 2024-02-19 PROCEDURE — 11042 DBRDMT SUBQ TIS 1ST 20SQCM/<: CPT | Performed by: INTERNAL MEDICINE

## 2024-02-19 RX ORDER — POVIDONE-IODINE 10 %
1 SOLUTION, NON-ORAL TOPICAL DAILY
Qty: 59 ML | Refills: 1 | Status: SHIPPED | OUTPATIENT
Start: 2024-02-19 | End: 2024-04-19

## 2024-02-19 RX ORDER — SILVER SULFADIAZINE 10 G/1000G
CREAM TOPICAL
Qty: 400 G | Refills: 2 | Status: SHIPPED | OUTPATIENT
Start: 2024-02-19 | End: 2024-04-19

## 2024-02-19 ASSESSMENT — PAIN SCALES - GENERAL: PAINLEVEL: 3

## 2024-02-19 NOTE — PROGRESS NOTES
"REQUESTING PHYSICIAN: Jorge Boone Ref.; Jany Nina  DOS:  02/19/2024  REASON FOR CONSULT:     Patient ID: Tim Stokes is a 53 y.o. male     HPI    HISTORY OF PRESENT ILLNESS:   52 yo man here for hospital follow up - admitted 2/9-2/14 for CTLI. Underwent endovascular revasc with Mely. This was complicated by distal embolization. Since DC reports using ?aquaphor over all the the leg and wounds covered with adaptic. This is being changed daily. Reports was not given home wound care recs at the time of DC. Not using compression since DC.     From the admission note:  This ulcer is approx 2 years old. Underwent CABG 2022 c/b sepsis and by report these wounds developed at the time of admission. Prev managed at [facility] by \"many doctors\" but on chart review this has been mostly [facility] nursing and [facility] management. 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       Since seen during admission. Wound bases remain predominantly fibronectotic. Skin is better with less adherent exudate and eschar covering. Sleeping in bed. Able to keep the legs elevated more.    PMH/PSH:    DM  HTN  HPL  CAD  S/p CABG  Nonhealing sternal wound  PAD with LCIA, right and left EIA stenting and POBA left SFA and POP  Left foot lateral ray amp    FAMILY HISTORY:     Not pertinent    SOCIAL HISTORY:     Tobacco reports quit smoking with this admission - still smoking when seen during admission  Employment     REVIEW OF SYSTEMS:     No fevers, chills,  weight is stable  No sores, +ulcers, rashes, skin lesions  Reports using boost supplements  No HA, SZ, syncope,   No CP,  chest pressure  No cough, SOB  No ABD pain  No BRBPR, melena, hematuria  No bleeding  + edema, no calf pain  No ambulatory leg pain  + paraesthesias    PHYSICAL EXAMINATION:     ARRIVAL:   Ambulating  TRANSFER:   None    Gen: Appears well, NAD  HEENT: " "WNL  Chest: CTA  CVS: regular without murmur or gallop  Ext: no edema, nontender  Skin: good condition without wounds or lesions  Pulses: DP 2+; PT 2+  Neuro: grossly normal, CN intact, ENRICO x 4  Mood and affect appropriate    Extremity Temperature:    RIGHT: normal  LEFT: normal    Edema:    Vitals  /78 (BP Location: Left arm)   Pulse 87   Resp 18   Ht 1.803 m (5' 11\")   Wt 132 kg (290 lb)   BMI 40.45 kg/m²     ADDITIONAL DATA:       Current Dressing:  Who is performing the dressing change:  [facility]; And family  Dressing applied: Adaptic, ABD, Super absorber, and Kerlix BHAVESH  Dressing Frequency:  Daily    Edema Management   Compression:  Right: None CALF 39.5 CM   Left: None   CALF 43.5 CM     Other Modality  Sleeping in Bed: Yes  Elevating FOB:  Yes    Offloading/Pressure Relief  Activity Level:  Ad joie  Protection Devices:  NA    Offloading/Pressure Relief Effective?     Wound Assessment:    Wound 02/09/24 Diabetic Ulcer Leg Right;Proximal;Medial (Active)   Date First Assessed/Time First Assessed: 02/09/24 1430   Primary Wound Type: Diabetic Ulcer  Location: Leg  Wound Location Orientation: Right;Proximal;Medial      Assessments 2/9/2024  2:30 PM 2/19/2024 10:58 AM   Wound Image       Site Assessment -- Eschar   Laney-Wound Assessment -- Intact   Wound Length (cm) -- 5 cm   Wound Width (cm) -- 2 cm   Wound Surface Area (cm^2) -- 10 cm^2   Drainage Description -- None   Drainage Amount -- None       Inactive Orders   Date Order Priority Status Authorizing Provider   02/12/24 1333 Change dressing Routine Discontinued ANURADHA Riley-CNP       Wound 02/09/24 Venous Ulcer Leg Medial;Right (Active)   Date First Assessed/Time First Assessed: 02/09/24 1432   Primary Wound Type: Venous Ulcer  Location: Leg  Wound Location Orientation: Medial;Right      Assessments 2/9/2024  2:30 PM 2/19/2024 10:45 AM   Wound Image      Site Assessment -- Pink;Red;Fibrinous;Eschar   Laney-Wound Assessment -- Intact "   Non-staged Wound Description -- Full thickness   Shape irregular unstageable ulcers --   Wound Length (cm) -- 12 cm   Wound Width (cm) -- 9 cm   Wound Surface Area (cm^2) -- 108 cm^2   Wound Depth (cm) -- 0.3 cm   Wound Volume (cm^3) -- 32.4 cm^3   State of Healing Non-healing --   Wound Bed Granulation (%) -- 1 %   Drainage Description -- Serosanguineous;Foul odor   Drainage Amount -- Large       Inactive Orders   Date Order Priority Status Authorizing Provider   02/12/24 1333 Change dressing Routine Discontinued ANURADHA Riley-DRAGAN       Wound 02/09/24 Venous Ulcer Ankle Right;Medial (Active)   Date First Assessed/Time First Assessed: 02/09/24 1433   Primary Wound Type: Venous Ulcer  Location: Ankle  Wound Location Orientation: Right;Medial      Assessments 2/9/2024  2:30 PM 2/19/2024 10:45 AM   Wound Image      Site Assessment -- Pink;Red;Fibrinous   Laney-Wound Assessment -- Intact   Non-staged Wound Description -- Full thickness   Shape irregular --   Wound Length (cm) -- 0.5 cm   Wound Width (cm) -- 0.6 cm   Wound Surface Area (cm^2) -- 0.3 cm^2   Wound Depth (cm) -- 0.1 cm   Wound Volume (cm^3) -- 0.03 cm^3   State of Healing Non-healing --   Wound Bed Granulation (%) -- 75 %   Drainage Description -- Serosanguineous   Drainage Amount -- Scant       Inactive Orders   Date Order Priority Status Authorizing Provider   02/12/24 1333 Change dressing Routine Discontinued JONEL Riley       Wound 02/09/24 Incision Pelvis Right (Active)   Date First Assessed/Time First Assessed: 02/09/24 1430   Present on Original Admission: No  Primary Wound Type: Incision  Location: Pelvis  Wound Location Orientation: Right      Assessments 2/9/2024  2:30 PM 2/13/2024  9:00 AM   Site Assessment Clean;Dry;Intact Clean;Dry;Intact   Laney-Wound Assessment Clean;Dry --       No associated orders.       Wound 02/19/24 Arterial Ulcer Toe (Comment  which one) Left (Active)   Date First Assessed: 02/19/24   Primary Wound  Type: Arterial Ulcer  Location: (c) Toe (Comment  which one)  Wound Location Orientation: Left      Assessments 2/19/2024 10:52 AM   Wound Image      Shape Photo documentation       No associated orders.       Wound 02/19/24 Arterial Ulcer Toe (Comment  which one) Left (Active)   Date First Assessed: 02/19/24   Primary Wound Type: Arterial Ulcer  Location: (c) Toe (Comment  which one)  Wound Location Orientation: Left      Assessments 2/19/2024 10:54 AM   Wound Image      Shape Photo documentation       No associated orders.       Wound 02/19/24 Arterial Ulcer Foot Left (Active)   Date First Assessed: 02/19/24   Primary Wound Type: Arterial Ulcer  Location: Foot  Wound Location Orientation: Left      Assessments 2/19/2024 11:02 AM   Wound Image        Site Assessment Pink;Red;Fibrinous;Eschar   Laney-Wound Assessment Intact   Non-staged Wound Description Full thickness   Shape CIRCUM   Wound Length (cm) 19 cm   Wound Width (cm) 30 cm   Wound Surface Area (cm^2) 570 cm^2   Wound Depth (cm) 0.4 cm   Wound Volume (cm^3) 228 cm^3   Wound Bed Granulation (%) 10 %   Drainage Description Serosanguineous;Foul odor   Drainage Amount Large       No associated orders.       Wound 02/19/24 Venous Ulcer Leg Left (Active)   Date First Assessed: 02/19/24   Primary Wound Type: Venous Ulcer  Location: Leg  Wound Location Orientation: Left      Assessments 2/19/2024 11:03 AM 2/19/2024 11:04 AM   Wound Image       Site Assessment Pink;Red;Fibrinous;Eschar --   Laney-Wound Assessment Intact --   Non-staged Wound Description Full thickness --   Shape CIRCUM --   Wound Length (cm) 24 cm --   Wound Width (cm) 30 cm --   Wound Surface Area (cm^2) 720 cm^2 --   Wound Depth (cm) 0.4 cm --   Wound Volume (cm^3) 288 cm^3 --   Wound Bed Granulation (%) 10 % --   Drainage Description Serosanguineous;Foul odor --   Drainage Amount Large --       No associated orders.          Debridement   Wound 02/09/24 Venous Ulcer Leg Medial;Right    Consent  obtained? verbal  Consent given by: patient  Performed by: resident and physician  Debridement type: surgical  Level of debridement: subcutaneous tissue  Pain control: lidocaine 2%  Post-debridement measurements  Length (cm): 12  Width (cm): 9  Depth (cm): 0.4  Percent debrided: 20%  Surface Area (cm^2): 108  Area debrided (cm^2): 21.6  Volume (cm^3): 43.2  Tissue and other material debrided: subcutaneous tissue  Devitalized tissue debrided: fibrin, necrotic debris and slough  Instrument(s) utilized: curette, forceps, scissors and blade  Bleeding: small  Hemostasis obtained with: not applicable  Response to treatment: procedure was tolerated well    Debridement   Wound 02/19/24 Venous Ulcer Leg Left    Consent obtained? verbal  Consent given by: patient  Performed by: physician  Debridement type: surgical  Level of debridement: subcutaneous tissue  Pain control: lidocaine 2%  Post-debridement measurements  Length (cm): 24  Width (cm): 30  Depth (cm): 0.5  Percent debrided: 20%  Surface Area (cm^2): 720  Area debrided (cm^2): 144  Volume (cm^3): 360  Tissue and other material debrided: subcutaneous tissue  Devitalized tissue debrided: fibrin, necrotic debris and slough  Instrument(s) utilized: forceps, scissors and blade  Bleeding: small  Hemostasis obtained with: pressure  Response to treatment: procedure was tolerated well    Debridement   Wound 02/19/24 Arterial Ulcer Foot Left    Consent obtained? verbal  Consent given by: patient  Performed by: physician  Debridement type: surgical  Level of debridement: subcutaneous tissue  Pain control: lidocaine 2%  Post-debridement measurements  Length (cm): 19  Width (cm): 30  Depth (cm): 0.5  Percent debrided: 10%  Surface Area (cm^2): 570  Area debrided (cm^2): 57  Volume (cm^3): 285  Tissue and other material debrided: subcutaneous tissue  Devitalized tissue debrided: fibrin, necrotic debris and slough  Comment regarding debrided tissue: Lysed skin  Instrument(s)  utilized: forceps, scissors and blade  Bleeding: small  Hemostasis obtained with: not applicable  Response to treatment: procedure was tolerated well        Assessment/Plan LLE CLTI and diffuse ulcers as noted - change skin care to aquaphor daily. Wound care with SSD and ABD change daily. Tubigrip for compression at all times. Elevate AMAP. Follow up 2 weeks.     Visit Orders  Vascular Study Required: n  Labs Required: n  Radiology Imaging Required: n  Consultation Required: n  Rx Provided:   Changes to Plan of Care: y      New Orders:  Wash: sera  Irrigate/Soak:  Skin Care: aqauphor to the intact skin  Dressing: betadine paint to the toes daily; SSD and ABD to the ulcers change daily  Compression: tubigrip   Offloading:     Discharge Planning:    Follow Up: 2 weeks  Nurse Visit: prn      General Instructions:  Center RN to apply EMLA/Lidocaine 1% jelly/LMX  topically to wound(s) prior to debridement by physician.  Center RN my see patient as a nurse consult in my absence.      RN Post Procedure Note:        HCC/ECF/Assisted Living  Agency/Facility:   days/week  Contacted Regarding Changes:

## 2024-02-19 NOTE — PATIENT INSTRUCTIONS
Assessment/Plan LLE CLTI and diffuse ulcers as noted - change skin care to aquaphor daily. Wound care with SSD and ABD change daily. Tubigrip for compression at all times. Elevate AMAP. Follow up 2 weeks.     Visit Orders  Vascular Study Required: n  Labs Required: n  Radiology Imaging Required: n  Consultation Required: n  Rx Provided:   Changes to Plan of Care: y      New Orders:  Wash: sera  Irrigate/Soak:  Skin Care: aqauphor to the intact skin  Dressing: betadine paint to the toes daily; SSD and ABD to the ulcers change daily  Compression: tubigrip   Offloading:     Discharge Planning:    Follow Up: 2 weeks  Nurse Visit: prn

## 2024-03-04 ENCOUNTER — OFFICE VISIT (OUTPATIENT)
Dept: WOUND CARE | Facility: CLINIC | Age: 54
End: 2024-03-04
Payer: COMMERCIAL

## 2024-03-04 VITALS
WEIGHT: 290 LBS | BODY MASS INDEX: 40.6 KG/M2 | SYSTOLIC BLOOD PRESSURE: 174 MMHG | HEIGHT: 71 IN | DIASTOLIC BLOOD PRESSURE: 88 MMHG | HEART RATE: 93 BPM

## 2024-03-04 DIAGNOSIS — L97.523: ICD-10-CM

## 2024-03-04 DIAGNOSIS — L97.222 CHRONIC ULCER OF LEFT CALF WITH FAT LAYER EXPOSED (MULTI): Primary | ICD-10-CM

## 2024-03-04 DIAGNOSIS — I96 GANGRENE OF TOE OF LEFT FOOT (MULTI): ICD-10-CM

## 2024-03-04 DIAGNOSIS — L97.212: ICD-10-CM

## 2024-03-04 DIAGNOSIS — I70.223 CRITICAL LIMB ISCHEMIA OF BOTH LOWER EXTREMITIES (MULTI): ICD-10-CM

## 2024-03-04 PROCEDURE — 11045 DBRDMT SUBQ TISS EACH ADDL: CPT | Performed by: INTERNAL MEDICINE

## 2024-03-04 PROCEDURE — 11042 DBRDMT SUBQ TIS 1ST 20SQCM/<: CPT | Performed by: INTERNAL MEDICINE

## 2024-03-04 RX ORDER — TRAMADOL HYDROCHLORIDE 50 MG/1
50 TABLET ORAL EVERY 6 HOURS PRN
Qty: 15 TABLET | Refills: 0 | Status: SHIPPED | OUTPATIENT
Start: 2024-03-04 | End: 2024-03-11

## 2024-03-04 RX ORDER — OXYCODONE HYDROCHLORIDE 5 MG/1
5 TABLET ORAL EVERY 6 HOURS PRN
COMMUNITY
Start: 2024-02-09 | End: 2024-05-15 | Stop reason: WASHOUT

## 2024-03-04 ASSESSMENT — COLUMBIA-SUICIDE SEVERITY RATING SCALE - C-SSRS
2. HAVE YOU ACTUALLY HAD ANY THOUGHTS OF KILLING YOURSELF?: NO
6. HAVE YOU EVER DONE ANYTHING, STARTED TO DO ANYTHING, OR PREPARED TO DO ANYTHING TO END YOUR LIFE?: NO
1. IN THE PAST MONTH, HAVE YOU WISHED YOU WERE DEAD OR WISHED YOU COULD GO TO SLEEP AND NOT WAKE UP?: NO

## 2024-03-04 ASSESSMENT — ENCOUNTER SYMPTOMS
OCCASIONAL FEELINGS OF UNSTEADINESS: 1
DEPRESSION: 0
LOSS OF SENSATION IN FEET: 0

## 2024-03-04 ASSESSMENT — PATIENT HEALTH QUESTIONNAIRE - PHQ9
SUM OF ALL RESPONSES TO PHQ9 QUESTIONS 1 AND 2: 0
2. FEELING DOWN, DEPRESSED OR HOPELESS: NOT AT ALL
1. LITTLE INTEREST OR PLEASURE IN DOING THINGS: NOT AT ALL

## 2024-03-04 NOTE — PATIENT INSTRUCTIONS
Assessment/Plan   Johan LE ulcers - improving overall. Toes necrotic and allowing to demarcate/auto amputate. Continue the silvadene for now. Continue to offload and protect. Continue latex free tubigrip for compression. Follow up 2 weeks    VISIT ORDERS:  Vascular Study Required: n  Labs Required: n  Radiology Imaging Required: n  Consultation Required: n  Rx Provided:   Changes to Plan of Care: y      NEW ORDERS:  Wash: sera  Irrigate/Soak:  Skin Care: aquaphor to the skin  Dressing:  silvadene and ABD for wound care change daily; betadine moist gauze to the toes  Compression:  single latex free tubigrip for compression   Offloading:  protect from pressure at all times.     DISCHARGE PLANNING:    Follow Up: 2 weeks  Nurse Visit: prn

## 2024-03-04 NOTE — PROGRESS NOTES
"REFERRAL REQUESTED BY:  Dr. Boone    LAST SEEN:  2/19/24    Patient ID: Tim Stokes is a 53 y.o. male     HPI:   Here for follow up ambar LE ulcers. Feels like this is making progress.     CURRENT DRESSING:  Who is performing the dressing change:  Home Health Agency;[facility] and Family  Dressing applied: silvadene, dry dressing  Dressing Frequency: daily    EDEMA MANAGEMENT:  Compression:  Right: Tubigrip Latex free CALF 42 CM   Left: Tubigrip  Latex free CALF 37 CM     Other Modality  Sleeping in Bed: comment Sometimes - sits at the bedside frequently.   Elevating FOB:  yes    Offloading/Pressure Relief  Activity Level:  ad joie  Protection Devices:     Offloading/Pressure Relief Effective?     ROS:      Objective     VITALS:  /88 (BP Location: Right arm, Patient Position: Sitting, BP Cuff Size: Adult)   Pulse 93   Ht 1.803 m (5' 11\")   Wt 132 kg (290 lb)   BMI 40.45 kg/m²       Physical Exam    ARRIVAL:  Wheelchair  TRANSFER:  One Assist, Moderate    PSYCHIATRIC:  Oriented to person, place and time: A & O x 3    CONSTITUTIONAL:    Appearance:  Well Groomed and Well Nourished    SKIN:   chronic skin changes ambar     PULSES:     EXTREMITY TEMPERATURE:    RIGHT: normal  LEFT: normal    EDEMA: mild-moderate    WOUND ASSESSMENT:    Wound 02/09/24 Diabetic Ulcer Leg Right;Proximal;Medial (Active)   Date First Assessed/Time First Assessed: 02/09/24 1430   Primary Wound Type: Diabetic Ulcer  Location: Leg  Wound Location Orientation: Right;Proximal;Medial      Assessments 2/9/2024  2:30 PM 3/4/2024 11:00 AM   Wound Image       Site Assessment -- Eschar   Laney-Wound Assessment -- Intact   Non-staged Wound Description -- Not applicable   Wound Length (cm) -- 0.5 cm   Wound Width (cm) -- 0.5 cm   Wound Surface Area (cm^2) -- 0.25 cm^2   Drainage Description -- None   Drainage Amount -- None       Inactive Orders   Date Order Priority Status Authorizing Provider   02/12/24 0403 Change dressing Routine Discontinued " JONEL Riley       Wound 02/09/24 Venous Ulcer Leg Medial;Right (Active)   Date First Assessed/Time First Assessed: 02/09/24 1432   Primary Wound Type: Venous Ulcer  Location: Leg  Wound Location Orientation: Medial;Right      Assessments 2/9/2024  2:30 PM 3/4/2024 10:58 AM   Wound Image      Site Assessment -- Necrotic;Pink;Fibrinous   Laney-Wound Assessment -- Intact   Non-staged Wound Description -- Full thickness   Shape irregular unstageable ulcers --   Wound Length (cm) -- 11 cm   Wound Width (cm) -- 10 cm   Wound Surface Area (cm^2) -- 110 cm^2   Wound Depth (cm) -- 0.3 cm   Wound Volume (cm^3) -- 33 cm^3   Wound Healing % -- -2   State of Healing Non-healing --   Wound Bed Granulation (%) -- 75 %   Drainage Description -- Serosanguineous   Drainage Amount -- Large       Inactive Orders   Date Order Priority Status Authorizing Provider   02/19/24 1228 Debridement Routine Completed Maegan Marquez MD   02/12/24 1333 Change dressing Routine Discontinued ANURADHA RileyDRAGAN       Wound 02/09/24 Venous Ulcer Ankle Right;Medial (Active)   Date First Assessed/Time First Assessed: 02/09/24 1433   Primary Wound Type: Venous Ulcer  Location: Ankle  Wound Location Orientation: Right;Medial      Assessments 2/9/2024  2:30 PM 3/4/2024 10:57 AM   Wound Image      Site Assessment -- Pink;Red;Fibrinous   Laney-Wound Assessment -- Intact   Non-staged Wound Description -- Full thickness   Shape irregular --   Wound Length (cm) -- 0.4 cm   Wound Width (cm) -- 1 cm   Wound Surface Area (cm^2) -- 0.4 cm^2   Wound Depth (cm) -- 0.1 cm   Wound Volume (cm^3) -- 0.04 cm^3   Wound Healing % -- -33   State of Healing Non-healing --   Wound Bed Granulation (%) -- 10 %   Drainage Description -- Serosanguineous   Drainage Amount -- Scant       Inactive Orders   Date Order Priority Status Authorizing Provider   02/12/24 1333 Change dressing Routine Discontinued JONEL Riley       Wound 02/09/24 Incision Pelvis  Right (Active)   Date First Assessed/Time First Assessed: 02/09/24 1430   Present on Original Admission: No  Primary Wound Type: Incision  Location: Pelvis  Wound Location Orientation: Right      Assessments 2/9/2024  2:30 PM 2/19/2024 11:08 AM   Wound Image      Site Assessment Clean;Dry;Intact --   Laney-Wound Assessment Clean;Dry --       No associated orders.       Wound 02/19/24 Arterial Ulcer Toe (Comment  which one) Left (Active)   Date First Assessed: 02/19/24   Primary Wound Type: Arterial Ulcer  Location: (c) Toe (Comment  which one)  Wound Location Orientation: Left      Assessments 2/19/2024 10:52 AM 3/4/2024 11:10 AM   Wound Image        Shape Photo documentation Photo documentation       No associated orders.       Wound 02/19/24 Arterial Ulcer Toe (Comment  which one) Left (Active)   Date First Assessed: 02/19/24   Primary Wound Type: Arterial Ulcer  Location: (c) Toe (Comment  which one)  Wound Location Orientation: Left      Assessments 2/19/2024 10:54 AM 3/4/2024 11:07 AM   Wound Image        Shape Photo documentation Photo documentation       No associated orders.       Wound 02/19/24 Arterial Ulcer Foot Left (Active)   Date First Assessed: 02/19/24   Primary Wound Type: Arterial Ulcer  Location: Foot  Wound Location Orientation: Left      Assessments 2/19/2024 11:02 AM 3/4/2024 11:03 AM   Wound Image           Site Assessment Pink;Red;Fibrinous;Eschar Pink;Red;Fibrinous;Necrotic   Laney-Wound Assessment Intact Intact   Non-staged Wound Description Full thickness Full thickness   Shape CIRCUM CIRCUM.   Wound Length (cm) 19 cm 19 cm   Wound Width (cm) 30 cm 28.5 cm   Wound Surface Area (cm^2) 570 cm^2 541.5 cm^2   Wound Depth (cm) 0.4 cm 0.2 cm   Wound Volume (cm^3) 228 cm^3 108.3 cm^3   Wound Healing % -- 53   Wound Bed Granulation (%) 10 % 10 %   Drainage Description Serosanguineous;Foul odor Serosanguineous   Drainage Amount Large Large       Inactive Orders   Date Order Priority Status  Authorizing Provider   02/19/24 1231 Debridement Routine Completed Maegan Marquez MD       Wound 02/19/24 Venous Ulcer Leg Left (Active)   Date First Assessed: 02/19/24   Primary Wound Type: Venous Ulcer  Location: Leg  Wound Location Orientation: Left      Assessments 2/19/2024 11:03 AM 3/4/2024 11:00 AM   Wound Image        Site Assessment Pink;Red;Fibrinous;Eschar Pink;Red;Fibrinous;Necrotic   Alney-Wound Assessment Intact Intact   Non-staged Wound Description Full thickness Full thickness   Shape CIRCUM CIRCUM   Wound Length (cm) 24 cm 18 cm   Wound Width (cm) 30 cm 31 cm   Wound Surface Area (cm^2) 720 cm^2 558 cm^2   Wound Depth (cm) 0.4 cm 0.2 cm   Wound Volume (cm^3) 288 cm^3 111.6 cm^3   Wound Healing % -- 61   Wound Bed Granulation (%) 10 % 10 %   Drainage Description Serosanguineous;Foul odor Serosanguineous   Drainage Amount Large Large       Inactive Orders   Date Order Priority Status Authorizing Provider   02/19/24 1229 Debridement Routine Completed Maegan Marquez MD          Debridement   Wound 02/09/24 Venous Ulcer Leg Medial;Right    Consent obtained? verbal  Consent given by: patient  Performed by: physician  Debridement type: surgical  Level of debridement: subcutaneous tissue  Pain control: lidocaine 2%  Post-debridement measurements  Length (cm): 11  Width (cm): 10  Depth (cm): 0.4  Percent debrided: 60%  Surface Area (cm^2): 110  Area debrided (cm^2): 66  Volume (cm^3): 44  Tissue and other material debrided: subcutaneous tissue and tendon  Devitalized tissue debrided: fibrin, necrotic debris and slough  Instrument(s) utilized: curette, forceps and scissors  Bleeding: small  Hemostasis obtained with: not applicable  Response to treatment: procedure was tolerated well    Debridement   Wound 02/19/24 Arterial Ulcer Foot Left    Consent obtained? verbal  Consent given by: patient  Performed by: physician  Debridement type: surgical  Level of debridement: subcutaneous tissue  Pain control:  lidocaine 2%  Post-debridement measurements  Length (cm): 19  Width (cm): 28.5  Depth (cm): 0.2  Percent debrided: 40%  Surface Area (cm^2): 541.5  Area debrided (cm^2): 216.6  Volume (cm^3): 108.3  Tissue and other material debrided: subcutaneous tissue  Devitalized tissue debrided: biofilm, fibrin, necrotic debris and slough  Instrument(s) utilized: forceps, curette, blade and scissors  Bleeding: small  Hemostasis obtained with: not applicable  Response to treatment: procedure was tolerated well    Debridement   Wound 02/19/24 Venous Ulcer Leg Left    Consent obtained? verbal  Consent given by: patient  Performed by: physician  Debridement type: surgical  Level of debridement: subcutaneous tissue  Pain control: lidocaine 2%  Post-debridement measurements  Length (cm): 18  Width (cm): 31  Depth (cm): 0.3  Percent debrided: 50%  Surface Area (cm^2): 558  Area debrided (cm^2): 279  Volume (cm^3): 167.4  Tissue and other material debrided: subcutaneous tissue  Devitalized tissue debrided: biofilm, fibrin, necrotic debris and slough  Instrument(s) utilized: curette, forceps, scissors and blade  Bleeding: small  Hemostasis obtained with: not applicable  Response to treatment: procedure was tolerated well        Assessment/Plan   Johan LE ulcers - improving overall. Toes necrotic and allowing to demarcate/auto amputate. Continue the silvadene for now. Continue to offload and protect. Continue latex free tubigrip for compression. Follow up 2 weeks    VISIT ORDERS:  Vascular Study Required: n  Labs Required: n  Radiology Imaging Required: n  Consultation Required: n  Rx Provided:   Changes to Plan of Care: y      NEW ORDERS:  Wash: sera  Irrigate/Soak:  Skin Care: aquaphor to the skin  Dressing:  silvadene and ABD for wound care change daily; betadine moist gauze to the toes  Compression:  single latex free tubigrip for compression   Offloading:  protect from pressure at all times.     DISCHARGE PLANNING:    Follow Up: 2  weeks  Nurse Visit: prn    General Instructions:  Center RN to apply EMLA/Lidocaine 1% jelly/LMX  topically to wound(s) prior to debridement by physician.  Center RN my see patient as a nurse consult in my absence.      RN Post Procedure Note:  BLE washed  after debridement. Silvadene cream applied to all wound beds then covered withABD's, wrapped with conform then placed single  latex-free tubigrip each leg. Home care to come 3x/week to provide dressing changes then family member to change the other 4 days. Patient tolerated well.Moi JUAREZ      HCC/ECF/Assisted Living  Agency/Facility: [facility]  days/week: 3  Contacted Regarding Changes: Yes

## 2024-03-12 ENCOUNTER — PHARMACY VISIT (OUTPATIENT)
Dept: PHARMACY | Facility: CLINIC | Age: 54
End: 2024-03-12
Payer: COMMERCIAL

## 2024-03-12 PROCEDURE — RXMED WILLOW AMBULATORY MEDICATION CHARGE

## 2024-03-20 PROBLEM — E66.01 MORBID OBESITY DUE TO EXCESS CALORIES (MULTI): Status: ACTIVE | Noted: 2017-07-05

## 2024-03-20 PROBLEM — E66.811 OBESITY, CLASS I, BMI 30-34.9: Status: ACTIVE | Noted: 2023-01-23

## 2024-03-20 PROBLEM — F17.200 NICOTINE USE DISORDER: Status: ACTIVE | Noted: 2022-03-16

## 2024-03-20 PROBLEM — R65.20 SEVERE SEPSIS (MULTI): Status: ACTIVE | Noted: 2022-05-17

## 2024-03-20 PROBLEM — A41.9 SEVERE SEPSIS (MULTI): Status: ACTIVE | Noted: 2022-05-17

## 2024-03-20 PROBLEM — L97.929: Status: ACTIVE | Noted: 2017-07-18

## 2024-03-20 PROBLEM — G89.18 ACUTE POST-OPERATIVE PAIN: Status: ACTIVE | Noted: 2022-03-22

## 2024-03-20 PROBLEM — I87.093 POST-PHLEBITIC DERMATOSIS OF BOTH LOWER EXTREMITIES: Status: ACTIVE | Noted: 2017-07-05

## 2024-03-20 PROBLEM — Z95.1 S/P CABG X 3: Status: ACTIVE | Noted: 2022-03-24

## 2024-03-20 PROBLEM — L97.929 VENOUS ULCER OF LEFT LEG (MULTI): Status: ACTIVE | Noted: 2017-07-05

## 2024-03-20 PROBLEM — E11.51 DM (DIABETES MELLITUS) TYPE II, CONTROLLED, WITH PERIPHERAL VASCULAR DISORDER (MULTI): Status: ACTIVE | Noted: 2023-02-13

## 2024-03-20 PROBLEM — E11.59: Status: ACTIVE | Noted: 2017-01-06

## 2024-03-20 PROBLEM — J43.2 CENTRILOBULAR EMPHYSEMA (MULTI): Status: ACTIVE | Noted: 2023-02-13

## 2024-03-20 PROBLEM — I70.244: Status: ACTIVE | Noted: 2017-07-05

## 2024-03-20 PROBLEM — J95.89 ACUTE POSTOPERATIVE RESPIRATORY INSUFFICIENCY: Status: ACTIVE | Noted: 2022-03-22

## 2024-03-20 PROBLEM — I83.029 VENOUS ULCER OF LEFT LEG (MULTI): Status: ACTIVE | Noted: 2017-07-05

## 2024-03-20 PROBLEM — K59.00 CONSTIPATION: Status: ACTIVE | Noted: 2017-07-04

## 2024-03-20 PROBLEM — I50.9 ACUTE DECOMPENSATED HEART FAILURE (MULTI): Status: ACTIVE | Noted: 2022-03-16

## 2024-03-20 PROBLEM — L98.499 NON-HEALING ULCER (MULTI): Status: ACTIVE | Noted: 2017-07-17

## 2024-03-20 PROBLEM — E66.9 OBESITY, CLASS I, BMI 30-34.9: Status: ACTIVE | Noted: 2023-01-23

## 2024-03-20 PROBLEM — E44.1 MALNUTRITION OF MILD DEGREE (MULTI): Status: ACTIVE | Noted: 2022-05-24

## 2024-03-21 ENCOUNTER — OFFICE VISIT (OUTPATIENT)
Dept: WOUND CARE | Facility: CLINIC | Age: 54
End: 2024-03-21
Payer: COMMERCIAL

## 2024-03-21 ENCOUNTER — APPOINTMENT (OUTPATIENT)
Dept: WOUND CARE | Facility: CLINIC | Age: 54
End: 2024-03-21
Payer: COMMERCIAL

## 2024-03-21 VITALS
SYSTOLIC BLOOD PRESSURE: 174 MMHG | DIASTOLIC BLOOD PRESSURE: 97 MMHG | HEIGHT: 71 IN | HEART RATE: 99 BPM | BODY MASS INDEX: 40.88 KG/M2 | WEIGHT: 292 LBS

## 2024-03-21 DIAGNOSIS — I96 GANGRENE OF TOE OF LEFT FOOT (MULTI): ICD-10-CM

## 2024-03-21 DIAGNOSIS — L97.212: ICD-10-CM

## 2024-03-21 DIAGNOSIS — I70.223 CRITICAL LIMB ISCHEMIA OF BOTH LOWER EXTREMITIES (MULTI): ICD-10-CM

## 2024-03-21 DIAGNOSIS — L97.523: ICD-10-CM

## 2024-03-21 DIAGNOSIS — L97.222 CHRONIC ULCER OF LEFT CALF WITH FAT LAYER EXPOSED (MULTI): Primary | ICD-10-CM

## 2024-03-21 PROCEDURE — 11042 DBRDMT SUBQ TIS 1ST 20SQCM/<: CPT | Performed by: INTERNAL MEDICINE

## 2024-03-21 PROCEDURE — 3080F DIAST BP >= 90 MM HG: CPT | Performed by: INTERNAL MEDICINE

## 2024-03-21 PROCEDURE — 3051F HG A1C>EQUAL 7.0%<8.0%: CPT | Performed by: INTERNAL MEDICINE

## 2024-03-21 PROCEDURE — 3077F SYST BP >= 140 MM HG: CPT | Performed by: INTERNAL MEDICINE

## 2024-03-21 PROCEDURE — 4010F ACE/ARB THERAPY RXD/TAKEN: CPT | Performed by: INTERNAL MEDICINE

## 2024-03-21 PROCEDURE — 11045 DBRDMT SUBQ TISS EACH ADDL: CPT | Performed by: INTERNAL MEDICINE

## 2024-03-21 ASSESSMENT — PAIN SCALES - GENERAL: PAINLEVEL: 4

## 2024-03-21 ASSESSMENT — PATIENT HEALTH QUESTIONNAIRE - PHQ9
SUM OF ALL RESPONSES TO PHQ9 QUESTIONS 1 AND 2: 0
1. LITTLE INTEREST OR PLEASURE IN DOING THINGS: NOT AT ALL
2. FEELING DOWN, DEPRESSED OR HOPELESS: NOT AT ALL

## 2024-03-21 NOTE — PATIENT INSTRUCTIONS
Assessment/Plan   Ulcers improving. Foot demarcating. Does have exposed tendon right with some necrosis. Continue the same skin care, dressing and compression. Follow up 3 weeks. Limit walking - avoid trauma.     VISIT ORDERS:  Vascular Study Required: pending per [facility]  Labs Required:  n  Radiology Imaging Required: n  Consultation Required: n  Rx Provided:   Changes to Plan of Care: n      NEW ORDERS:  Wash: sera  Irrigate/Soak:  Skin Care: aquaphor  Dressing: silvadene and ABD to the wounds change daily. Betadine moist to the edge of the toes left  Compression: single latex free tubigrip   Offloading: limit walking avoid trauma    DISCHARGE PLANNING:    Follow Up: 3 weeks  Nurse Visit: prn

## 2024-03-21 NOTE — PROGRESS NOTES
"REFERRAL REQUESTED BY:  Dr. Harris    LAST SEEN:  3/4/2024    Patient ID: Tim Stokes is a 53 y.o. male     HPI:   Here for follow up ambar LE ulcers. Has more granulation visible. Toes are demarcating. Pain is variable.     CURRENT DRESSING:  Who is performing the dressing change: [facility]    Dressing applied: Siladene, ABD, Kerlex, Latex free tubigrip  Dressing Frequency: Daily    EDEMA MANAGEMENT:  Compression:  Right: Latex free Tubigrip CALF 37.5 CM   Left: Latex free Tubigrip   CALF 39.0 CM     Other Modality  Sleeping in Bed: yes, sits at the bedside frequently  Elevating FOB:  yes    Offloading/Pressure Relief  Activity Level:  ad joie  Protection Devices:  None    Offloading/Pressure Relief Effective?     ROS:      Objective     VITALS:  BP (!) 174/97 (BP Location: Right arm, Patient Position: Sitting, BP Cuff Size: Large adult)   Pulse 99   Ht 1.803 m (5' 11\")   Wt 132 kg (292 lb)   BMI 40.73 kg/m²       Physical Exam    ARRIVAL:  Wheelchair  TRANSFER:  None    PSYCHIATRIC:  Oriented to person, place and time: A & O x 3    CONSTITUTIONAL:    Appearance:  Well Groomed    SKIN:   chronic skin changes, gangrene of the toes     PULSES:     EXTREMITY TEMPERATURE:    RIGHT: normal  LEFT: normal    EDEMA:  mild    WOUND ASSESSMENT:    Wound 02/09/24 Diabetic Ulcer Leg Right;Proximal;Medial (Active)   Date First Assessed/Time First Assessed: 02/09/24 1430   Primary Wound Type: Diabetic Ulcer  Location: Leg  Wound Location Orientation: Right;Proximal;Medial      Assessments 2/9/2024  2:30 PM 3/21/2024  2:14 PM   Wound Image       Shape -- Healed       Inactive Orders   Date Order Priority Status Authorizing Provider   02/12/24 1333 Change dressing Routine Discontinued ANURADHA Riley-DRAGAN       Wound 02/09/24 Venous Ulcer Leg Medial;Right (Active)   Date First Assessed/Time First Assessed: 02/09/24 1432   Primary Wound Type: Venous Ulcer  Location: Leg  Wound Location " Orientation: Medial;Right      Assessments 2/9/2024  2:30 PM 3/21/2024  2:10 PM   Wound Image      Site Assessment -- Pink;Red;Granulation;Fibrinous;Necrotic;Eschar   Laney-Wound Assessment -- Intact   Shape irregular unstageable ulcers tendon exposed   Wound Length (cm) -- 11.5 cm   Wound Width (cm) -- 10 cm   Wound Surface Area (cm^2) -- 115 cm^2   Wound Depth (cm) -- 0.3 cm   Wound Volume (cm^3) -- 34.5 cm^3   Wound Healing % -- -6   State of Healing Non-healing --   Wound Bed Granulation (%) -- 10 %   Drainage Description -- Serosanguineous   Drainage Amount -- Large       Inactive Orders   Date Order Priority Status Authorizing Provider   03/04/24 1218 Debridement Routine Completed Maegan Marquez MD   02/19/24 1228 Debridement Routine Completed Maegan Marquez MD   02/12/24 1333 Change dressing Routine Discontinued JONEL Riley       Wound 02/09/24 Venous Ulcer Ankle Right;Medial (Active)   Date First Assessed/Time First Assessed: 02/09/24 1433   Primary Wound Type: Venous Ulcer  Location: Ankle  Wound Location Orientation: Right;Medial      Assessments 2/9/2024  2:30 PM 3/21/2024  2:09 PM   Wound Image      Site Assessment -- Pink;Red;Granulation;Fibrinous   Laney-Wound Assessment -- Intact   Non-staged Wound Description -- Full thickness   Shape irregular --   Wound Length (cm) -- 0.5 cm   Wound Width (cm) -- 0.5 cm   Wound Surface Area (cm^2) -- 0.25 cm^2   Wound Depth (cm) -- 0.1 cm   Wound Volume (cm^3) -- 0.025 cm^3   Wound Healing % -- 17   State of Healing Non-healing --   Wound Bed Granulation (%) -- 10 %   Drainage Description -- Serosanguineous   Drainage Amount -- Small       Inactive Orders   Date Order Priority Status Authorizing Provider   02/12/24 1333 Change dressing Routine Discontinued JONEL Riley       Wound 02/09/24 Incision Pelvis Right (Active)   Date First Assessed/Time First Assessed: 02/09/24 1430   Present on Original Admission: No  Primary Wound Type:  Incision  Location: Pelvis  Wound Location Orientation: Right      Assessments 2/9/2024  2:30 PM 2/19/2024 11:08 AM   Wound Image      Site Assessment Clean;Dry;Intact --   Laney-Wound Assessment Clean;Dry --       No associated orders.       Wound 02/19/24 Arterial Ulcer Toe (Comment  which one) Left (Active)   Date First Assessed: 02/19/24   Primary Wound Type: Arterial Ulcer  Location: (c) Toe (Comment  which one)  Wound Location Orientation: Left      Assessments 2/19/2024 10:52 AM 3/21/2024  2:05 PM   Wound Image        Site Assessment -- Necrotic   Shape Photo documentation 100% necrotic, foul odor       No associated orders.       Wound 02/19/24 Arterial Ulcer Toe (Comment  which one) Left (Active)   Date First Assessed: 02/19/24   Primary Wound Type: Arterial Ulcer  Location: (c) Toe (Comment  which one)  Wound Location Orientation: Left      Assessments 2/19/2024 10:54 AM 3/21/2024  2:06 PM   Wound Image        Site Assessment -- Necrotic   Shape Photo documentation 100% necrotic, foul odor       No associated orders.       Wound 02/19/24 Arterial Ulcer Foot Left (Active)   Date First Assessed: 02/19/24   Primary Wound Type: Arterial Ulcer  Location: Foot  Wound Location Orientation: Left      Assessments 2/19/2024 11:02 AM 3/21/2024  2:06 PM   Wound Image           Site Assessment Pink;Red;Fibrinous;Eschar Fibrinous;Pink;Red;Granulation;Eschar   Laney-Wound Assessment Intact Intact   Non-staged Wound Description Full thickness Full thickness   Shape CIRCUM --   Wound Length (cm) 19 cm 20 cm   Wound Width (cm) 30 cm 31 cm   Wound Surface Area (cm^2) 570 cm^2 620 cm^2   Wound Depth (cm) 0.4 cm 0.2 cm   Wound Volume (cm^3) 228 cm^3 124 cm^3   Wound Healing % -- 46   Wound Bed Granulation (%) 10 % 10 %   Drainage Description Serosanguineous;Foul odor Serosanguineous   Drainage Amount Large Large       Inactive Orders   Date Order Priority Status Authorizing Provider   03/04/24 1220 Debridement Routine Completed  Maegan Marquez MD   02/19/24 1231 Debridement Routine Completed Maegan Marquez MD       Wound 02/19/24 Venous Ulcer Leg Left (Active)   Date First Assessed: 02/19/24   Primary Wound Type: Venous Ulcer  Location: Leg  Wound Location Orientation: Left      Assessments 2/19/2024 11:03 AM 3/21/2024  2:07 PM   Wound Image        Site Assessment Pink;Red;Fibrinous;Eschar Pink;Red;Fibrinous;Granulation   Laney-Wound Assessment Intact Intact   Non-staged Wound Description Full thickness Full thickness   Shape CIRCUM --   Wound Length (cm) 24 cm 20.5 cm   Wound Width (cm) 30 cm 33 cm   Wound Surface Area (cm^2) 720 cm^2 676.5 cm^2   Wound Depth (cm) 0.4 cm 0.2 cm   Wound Volume (cm^3) 288 cm^3 135.3 cm^3   Wound Healing % -- 53   Wound Bed Granulation (%) 10 % 10 %   Drainage Description Serosanguineous;Foul odor Serosanguineous   Drainage Amount Large Large       Inactive Orders   Date Order Priority Status Authorizing Provider   03/04/24 1221 Debridement Routine Completed Maegan Marquez MD   02/19/24 1229 Debridement Routine Completed Maegan Marquez MD       Wound 03/21/24 Venous Ulcer Leg Right;Distal;Medial (Active)   Date First Assessed/Time First Assessed: 03/21/24 1412   Hand Hygiene Completed: Yes  Primary Wound Type: Venous Ulcer  Location: Leg  Wound Location Orientation: Right;Distal;Medial      Assessments 3/21/2024  2:13 PM   Wound Image     Site Assessment Fibrinous;Red;Pink   Laney-Wound Assessment Intact   Non-staged Wound Description Full thickness   Wound Length (cm) 0.5 cm   Wound Width (cm) 0.4 cm   Wound Surface Area (cm^2) 0.2 cm^2   Wound Depth (cm) 0.1 cm   Wound Volume (cm^3) 0.02 cm^3   Wound Bed Granulation (%) 95 %   Drainage Description Serosanguineous   Drainage Amount Small       No associated orders.       Wound 03/21/24 Venous Ulcer Leg Right;Anterior (Active)   Date First Assessed/Time First Assessed: 03/21/24 1414   Primary Wound Type: Venous Ulcer  Location: Leg  Wound Location  Orientation: Right;Anterior      Assessments 3/21/2024  2:16 PM   Wound Image     Site Assessment Fibrinous;Pink;Red   Laney-Wound Assessment Intact   Non-staged Wound Description Full thickness   Wound Length (cm) 1.8 cm   Wound Width (cm) 0.5 cm   Wound Surface Area (cm^2) 0.9 cm^2   Wound Depth (cm) 0.1 cm   Wound Volume (cm^3) 0.09 cm^3   Wound Bed Granulation (%) 10 %   Drainage Description Serosanguineous   Drainage Amount Small       No associated orders.          Debridement   Wound 02/09/24 Venous Ulcer Leg Medial;Right    Consent obtained? verbal  Consent given by: patient  Performed by: physician and resident  Debridement type: surgical  Level of debridement: subcutaneous tissue  Pain control: lidocaine 2% and benzocaine 20%  Post-debridement measurements  Length (cm): 11.5  Width (cm): 9  Depth (cm): 0.4  Percent debrided: 85%  Surface Area (cm^2): 103.5  Area debrided (cm^2): 87.98  Volume (cm^3): 41.4  Tissue and other material debrided: subcutaneous tissue and tendon  Devitalized tissue debrided: fibrin, necrotic debris and slough  Instrument(s) utilized: blade, curette, forceps and scissors  Bleeding: small  Hemostasis obtained with: not applicable  Response to treatment: procedure was tolerated well    Debridement   Wound 02/19/24 Arterial Ulcer Foot Left    Consent obtained? verbal  Consent given by: patient  Performed by: resident and physician  Debridement type: surgical  Level of debridement: subcutaneous tissue  Pain control: lidocaine 2%  Post-debridement measurements  Length (cm): 20  Width (cm): 31  Depth (cm): 0.3  Percent debrided: 40%  Surface Area (cm^2): 620  Area debrided (cm^2): 248  Volume (cm^3): 186  Tissue and other material debrided: subcutaneous tissue  Devitalized tissue debrided: fibrin and slough  Instrument(s) utilized: curette and forceps  Bleeding: small  Hemostasis obtained with: not applicable  Response to treatment: procedure was tolerated well    Debridement   Wound  02/19/24 Venous Ulcer Leg Left    Consent obtained? verbal  Consent given by: patient  Performed by: resident and physician  Debridement type: surgical  Level of debridement: subcutaneous tissue  Pain control: lidocaine 2%  Post-debridement measurements  Length (cm): 21  Width (cm): 29  Depth (cm): 0.3  Percent debrided: 90%  Surface Area (cm^2): 609  Area debrided (cm^2): 548.1  Volume (cm^3): 182.7  Tissue and other material debrided: subcutaneous tissue  Devitalized tissue debrided: fibrin and slough  Instrument(s) utilized: curette, blade and forceps  Bleeding: small  Hemostasis obtained with: not applicable  Response to treatment: procedure was tolerated well        Assessment/Plan   Ulcers improving. Foot demarcating. Does have exposed tendon right with some necrosis. Continue the same skin care, dressing and compression. Follow up 3 weeks. Limit walking - avoid trauma.     VISIT ORDERS:  Vascular Study Required: pending per Mely  Labs Required:  n  Radiology Imaging Required: n  Consultation Required: n  Rx Provided:   Changes to Plan of Care: n      NEW ORDERS:  Wash: sera  Irrigate/Soak:  Skin Care: aquaphor  Dressing: silvadene and ABD to the wounds change daily. Betadine moist to the edge of the toes left  Compression: single latex free tubigrip   Offloading: limit walking avoid trauma    DISCHARGE PLANNING:    Follow Up: 3 weeks  Nurse Visit: prn    General Instructions:  Center RN to apply EMLA/Lidocaine 1% jelly/LMX  topically to wound(s) prior to debridement by physician.  Center RN my see patient as a nurse consult in my absence.      RN Post Procedure Note:    BLE rewashed with warm soapy sera, rinsed, and patted dry. Aquaphor to the intact skin, Betadine paint to the edges of the toes left, silvadene to the base of the wounds covered with DSD. Pt tolerated procedure well, Patient scheduled for vasc. Lab and 3wk fuv.   Wouund care done by JAMESON May RN/ECF/Assisted  Living  Agency/Facility:   days/week:  Contacted Regarding Changes:

## 2024-03-28 ENCOUNTER — APPOINTMENT (OUTPATIENT)
Dept: VASCULAR MEDICINE | Facility: CLINIC | Age: 54
End: 2024-03-28
Payer: COMMERCIAL

## 2024-04-08 PROCEDURE — RXMED WILLOW AMBULATORY MEDICATION CHARGE

## 2024-04-09 ENCOUNTER — PHARMACY VISIT (OUTPATIENT)
Dept: PHARMACY | Facility: CLINIC | Age: 54
End: 2024-04-09
Payer: COMMERCIAL

## 2024-04-11 ENCOUNTER — HOSPITAL ENCOUNTER (OUTPATIENT)
Dept: VASCULAR MEDICINE | Facility: CLINIC | Age: 54
Discharge: HOME | End: 2024-04-11
Payer: COMMERCIAL

## 2024-04-11 ENCOUNTER — OFFICE VISIT (OUTPATIENT)
Dept: WOUND CARE | Facility: CLINIC | Age: 54
End: 2024-04-11
Payer: COMMERCIAL

## 2024-04-11 VITALS
SYSTOLIC BLOOD PRESSURE: 170 MMHG | DIASTOLIC BLOOD PRESSURE: 97 MMHG | WEIGHT: 295 LBS | HEIGHT: 71 IN | RESPIRATION RATE: 16 BRPM | HEART RATE: 62 BPM | BODY MASS INDEX: 41.3 KG/M2

## 2024-04-11 DIAGNOSIS — L97.523: ICD-10-CM

## 2024-04-11 DIAGNOSIS — I70.223 CRITICAL LIMB ISCHEMIA OF BOTH LOWER EXTREMITIES (MULTI): ICD-10-CM

## 2024-04-11 DIAGNOSIS — L97.212: ICD-10-CM

## 2024-04-11 DIAGNOSIS — L97.509 TOE ULCER (MULTI): ICD-10-CM

## 2024-04-11 DIAGNOSIS — L97.222 CHRONIC ULCER OF LEFT CALF WITH FAT LAYER EXPOSED (MULTI): Primary | ICD-10-CM

## 2024-04-11 DIAGNOSIS — I96 GANGRENE OF TOE OF LEFT FOOT (MULTI): ICD-10-CM

## 2024-04-11 PROCEDURE — 3051F HG A1C>EQUAL 7.0%<8.0%: CPT | Performed by: INTERNAL MEDICINE

## 2024-04-11 PROCEDURE — 93922 UPR/L XTREMITY ART 2 LEVELS: CPT | Performed by: SURGERY

## 2024-04-11 PROCEDURE — 93922 UPR/L XTREMITY ART 2 LEVELS: CPT

## 2024-04-11 PROCEDURE — 11045 DBRDMT SUBQ TISS EACH ADDL: CPT | Performed by: INTERNAL MEDICINE

## 2024-04-11 PROCEDURE — 3077F SYST BP >= 140 MM HG: CPT | Performed by: INTERNAL MEDICINE

## 2024-04-11 PROCEDURE — 4010F ACE/ARB THERAPY RXD/TAKEN: CPT | Performed by: INTERNAL MEDICINE

## 2024-04-11 PROCEDURE — 11042 DBRDMT SUBQ TIS 1ST 20SQCM/<: CPT | Performed by: INTERNAL MEDICINE

## 2024-04-11 PROCEDURE — 3080F DIAST BP >= 90 MM HG: CPT | Performed by: INTERNAL MEDICINE

## 2024-04-11 RX ORDER — ACETAMINOPHEN 500 MG
1000 TABLET ORAL EVERY 6 HOURS PRN
COMMUNITY

## 2024-04-11 ASSESSMENT — COLUMBIA-SUICIDE SEVERITY RATING SCALE - C-SSRS
2. HAVE YOU ACTUALLY HAD ANY THOUGHTS OF KILLING YOURSELF?: NO
1. IN THE PAST MONTH, HAVE YOU WISHED YOU WERE DEAD OR WISHED YOU COULD GO TO SLEEP AND NOT WAKE UP?: NO
6. HAVE YOU EVER DONE ANYTHING, STARTED TO DO ANYTHING, OR PREPARED TO DO ANYTHING TO END YOUR LIFE?: NO

## 2024-04-11 ASSESSMENT — PAIN SCALES - GENERAL: PAINLEVEL: 4

## 2024-04-11 NOTE — PROGRESS NOTES
"REFERRAL REQUESTED BY:  Dr. Esteban Boone    LAST SEEN:  3/21/24    Patient ID: Tim Stokes is a 53 y.o. male     HPI:   Here for follow up ambar LE ulcers and PAD. Reports has a lot of drainage. Overall ulcers seem to be healing.     CURRENT DRESSING:  Who is performing the dressing change:  [facility] and caregiver   Dressing applied: silvadene, ABD, Kerlix to leg wounds and betadine paint to necrotic toes  Dressing Frequency: daily     EDEMA MANAGEMENT:  Compression:  Right: Tubigrip CALF 38.0cm    Left: Tubigrip   CALF 41.0cm      Other Modality  Sleeping in Bed: yes  Elevating FOB:  yes    Offloading/Pressure Relief  Activity Level:  chairfast  Protection Devices: tries to not let any wounds rest of the bed.      Offloading/Pressure Relief Effective?     ROS:      Objective     VITALS:  BP (!) 170/97 (BP Location: Right arm, Patient Position: Lying)   Pulse 62   Resp 16   Ht 1.803 m (5' 11\")   Wt 134 kg (295 lb)   BMI 41.14 kg/m²       Physical Exam    ARRIVAL:  Wheelchair  TRANSFER:  One Assist, Minimal    PSYCHIATRIC:  Oriented to person, place and time: A & O x 3    CONSTITUTIONAL:    Appearance:  Well Groomed    SKIN:   chronic skin changes, necrosis forefoot, exposed tendon.      PULSES:     EXTREMITY TEMPERATURE:    RIGHT: normal  LEFT: normal    EDEMA: mild    WOUND ASSESSMENT:    Wound 02/09/24 Venous Ulcer Leg Medial;Right (Active)   Date First Assessed/Time First Assessed: 02/09/24 1432   Primary Wound Type: Venous Ulcer  Location: Leg  Wound Location Orientation: Medial;Right      Assessments 2/9/2024  2:30 PM 4/11/2024 12:26 PM   Wound Image      Site Assessment -- Pink;Fibrinous;Eschar   Laney-Wound Assessment -- Intact;Dry   Shape irregular unstageable ulcers Tendon exposed   Wound Length (cm) -- 11.6 cm   Wound Width (cm) -- 10.8 cm   Wound Surface Area (cm^2) -- 125.28 cm^2   Wound Depth (cm) -- 0.6 cm   Wound Volume (cm^3) -- 75.168 cm^3   Wound Healing % -- -132   State of " Healing Non-healing --   Wound Bed Granulation (%) -- 20 %   Wound Bed Eschar (%) -- 10 %   Drainage Description -- Serosanguineous   Drainage Amount -- Large       Inactive Orders   Date Order Priority Status Authorizing Provider   03/21/24 1533 Debridement Routine Completed Maegan Marquez MD   03/04/24 1218 Debridement Routine Completed Maegan Marquez MD   02/19/24 1228 Debridement Routine Completed Maegan Marquez MD   02/12/24 1333 Change dressing Routine Discontinued JONEL Riley       Wound 02/09/24 Venous Ulcer Ankle Right;Medial (Active)   Date First Assessed/Time First Assessed: 02/09/24 1433   Primary Wound Type: Venous Ulcer  Location: Ankle  Wound Location Orientation: Right;Medial      Assessments 2/9/2024  2:30 PM 4/11/2024 12:25 PM   Wound Image      Site Assessment -- Pink   Laney-Wound Assessment -- Intact;Dry   Shape irregular --   Wound Length (cm) -- 0.4 cm   Wound Width (cm) -- 0.3 cm   Wound Surface Area (cm^2) -- 0.12 cm^2   Wound Depth (cm) -- 0.1 cm   Wound Volume (cm^3) -- 0.012 cm^3   Wound Healing % -- 60   State of Healing Non-healing --   Wound Bed Granulation (%) -- 15 %   Drainage Description -- Serosanguineous   Drainage Amount -- Small       Inactive Orders   Date Order Priority Status Authorizing Provider   02/12/24 1333 Change dressing Routine Discontinued JONEL Riley       Wound 02/19/24 Arterial Ulcer Toe (Comment  which one) Left;Upper (Active)   Date First Assessed: 02/19/24   Primary Wound Type: Arterial Ulcer  Location: (c) Toe (Comment  which one)  Wound Location Orientation: Left;Upper      Assessments 2/19/2024 10:52 AM 4/11/2024 12:37 PM   Wound Image        Site Assessment -- Necrotic   Shape Photo documentation Photo documentation       No associated orders.       Wound 02/19/24 Arterial Ulcer Toe (Comment  which one) Left (Active)   Date First Assessed: 02/19/24   Primary Wound Type: Arterial Ulcer  Location: (c) Toe (Comment  which one)   Wound Location Orientation: Left      Assessments 2/19/2024 10:54 AM 4/11/2024 12:38 PM   Wound Image        Site Assessment -- Necrotic   Shape Photo documentation Photo documentation       No associated orders.       Wound 02/19/24 Arterial Ulcer Foot Left (Active)   Date First Assessed: 02/19/24   Primary Wound Type: Arterial Ulcer  Location: Foot  Wound Location Orientation: Left      Assessments 2/19/2024 11:02 AM 4/11/2024 12:38 PM   Wound Image            Site Assessment Pink;Red;Fibrinous;Eschar Red;Fibrinous;Granulation   Laney-Wound Assessment Intact Maceration;Dry;Intact   Non-staged Wound Description Full thickness Full thickness   Shape CIRCUM --   Wound Length (cm) 19 cm 19.5 cm   Wound Width (cm) 30 cm 31 cm   Wound Surface Area (cm^2) 570 cm^2 604.5 cm^2   Wound Depth (cm) 0.4 cm 0.2 cm   Wound Volume (cm^3) 228 cm^3 120.9 cm^3   Wound Healing % -- 47   Wound Bed Granulation (%) 10 % 30 %   Wound Bed Slough (%) -- 50 %   Drainage Description Serosanguineous;Foul odor Serosanguineous   Drainage Amount Large Large       Inactive Orders   Date Order Priority Status Authorizing Provider   03/21/24 1534 Debridement Routine Completed Maegan Marquez MD   03/04/24 1220 Debridement Routine Completed Maegan Marquez MD   02/19/24 1231 Debridement Routine Completed Maegan Marquez MD       Wound 02/19/24 Venous Ulcer Leg Left;Anterior (Active)   Date First Assessed: 02/19/24   Primary Wound Type: Venous Ulcer  Location: Leg  Wound Location Orientation: Left;Anterior      Assessments 2/19/2024 11:03 AM 4/11/2024 12:50 PM   Wound Image       Site Assessment Pink;Red;Fibrinous;Eschar Red;Fibrinous   Laney-Wound Assessment Intact Intact;Dry   Non-staged Wound Description Full thickness --   Shape CIRCUM No longer Circum   Wound Length (cm) 24 cm 12 cm   Wound Width (cm) 30 cm 8.4 cm   Wound Surface Area (cm^2) 720 cm^2 100.8 cm^2   Wound Depth (cm) 0.4 cm 0.3 cm   Wound Volume (cm^3) 288 cm^3 30.24 cm^3   Wound  Healing % -- 90   Wound Bed Granulation (%) 10 % 75 %   Drainage Description Serosanguineous;Foul odor Serosanguineous   Drainage Amount Large Large       Inactive Orders   Date Order Priority Status Authorizing Provider   03/21/24 1535 Debridement Routine Completed Maegan Marquez MD   03/04/24 1221 Debridement Routine Completed Maegan Marquez MD   02/19/24 1229 Debridement Routine Completed Maegan Marquez MD       Wound 03/21/24 Venous Ulcer Leg Right;Anterior (Active)   Date First Assessed/Time First Assessed: 03/21/24 1414   Primary Wound Type: Venous Ulcer  Location: Leg  Wound Location Orientation: Right;Anterior      Assessments 3/21/2024  2:16 PM 4/11/2024 12:28 PM   Wound Image       Site Assessment Fibrinous;Pink;Red Pink;Fibrinous;Eschar   Laney-Wound Assessment Intact Intact;Dry   Non-staged Wound Description Full thickness --   Wound Length (cm) 1.8 cm 2.5 cm   Wound Width (cm) 0.5 cm 0.5 cm   Wound Surface Area (cm^2) 0.9 cm^2 1.25 cm^2   Wound Depth (cm) 0.1 cm 0.1 cm   Wound Volume (cm^3) 0.09 cm^3 0.125 cm^3   Wound Healing % -- -39   Wound Bed Granulation (%) 10 % --   Wound Bed Eschar (%) -- 90 %   Drainage Description Serosanguineous Serosanguineous   Drainage Amount Small Large       No associated orders.       Wound Arterial Ulcer Foot Left;Plantar (Active)   No Date First Assessed or Time First Assessed found.   Primary Wound Type: Arterial Ulcer  Location: Foot  Wound Location Orientation: Left;Plantar      Assessments 4/11/2024 12:44 PM   Wound Image     Site Assessment Red;Fibrinous   Laney-Wound Assessment Intact   Non-staged Wound Description Full thickness   Wound Length (cm) 1.1 cm   Wound Width (cm) 1.4 cm   Wound Surface Area (cm^2) 1.54 cm^2   Wound Depth (cm) 0.1 cm   Wound Volume (cm^3) 0.154 cm^3   Wound Bed Granulation (%) 90 %   Drainage Description Serosanguineous   Drainage Amount Small       No associated orders.       Wound Arterial Ulcer Heel Left;Lateral (Active)   No  Date First Assessed or Time First Assessed found.   Primary Wound Type: Arterial Ulcer  Location: Heel  Wound Location Orientation: Left;Lateral      Assessments 4/11/2024 12:43 PM   Wound Image     Site Assessment Red;Fibrinous   Laney-Wound Assessment Maceration;Dry   Non-staged Wound Description Full thickness   Shape exposed tendon   Wound Length (cm) 4.2 cm   Wound Width (cm) 2.5 cm   Wound Surface Area (cm^2) 10.5 cm^2   Wound Depth (cm) 0.2 cm   Wound Volume (cm^3) 2.1 cm^3   Wound Bed Granulation (%) 25 %   Drainage Description Serosanguineous   Drainage Amount Moderate       No associated orders.       Wound 04/11/24 Venous Ulcer Leg Left;Medial (Active)   Date First Assessed: 04/11/24   Primary Wound Type: Venous Ulcer  Location: Leg  Wound Location Orientation: Left;Medial      Assessments 4/11/2024 12:52 PM   Wound Image     Site Assessment Red;Fibrinous   Laney-Wound Assessment Intact;Dry   Non-staged Wound Description Full thickness   Wound Length (cm) 2.2 cm   Wound Width (cm) 3 cm   Wound Surface Area (cm^2) 6.6 cm^2   Wound Depth (cm) 0.1 cm   Wound Volume (cm^3) 0.66 cm^3   Wound Bed Granulation (%) 5 %   Drainage Description Serosanguineous   Drainage Amount Small       No associated orders.       Wound 04/11/24 Venous Ulcer Leg Left;Lateral (Active)   Date First Assessed: 04/11/24   Primary Wound Type: Venous Ulcer  Location: Leg  Wound Location Orientation: Left;Lateral      Assessments 4/11/2024 12:48 PM   Wound Image     Site Assessment Red;Fibrinous   Laney-Wound Assessment Intact   Non-staged Wound Description Full thickness   Wound Length (cm) 2.4 cm   Wound Width (cm) 1.9 cm   Wound Surface Area (cm^2) 4.56 cm^2   Wound Depth (cm) 0.1 cm   Wound Volume (cm^3) 0.456 cm^3   Wound Bed Granulation (%) 75 %   Drainage Description Serosanguineous   Drainage Amount Small       No associated orders.          Debridement   Wound 02/09/24 Venous Ulcer Leg Medial;Right    Performed by: Maegan Marquez,  MD  Authorized by: Maegan Marquez MD      Consent   Consent obtained? verbal  Consent given by: patient    Debridement Details  Performed by: physician  Debridement type: surgical  Level of debridement: subcutaneous tissue  Pain control: lidocaine 2%  Pain control administration type: topical    Pre-debridement measurements  Length (cm): 11.6  Width (cm): 10.8  Depth (cm): 0.6  Surface Area (cm^2): 125.28    Post-debridement measurements  Length (cm): 10.6  Width (cm): 11.8  Depth (cm): 0.7  Percent debrided: 80%  Surface Area (cm^2): 125.08  Area Debrided (cm^2): 100.06  Volume (cm^3): 87.56    Tissue and other material debrided: subcutaneous tissue  Comment regarding debrided tissue: Tendon and calcified sc tissue  Devitalized tissue debrided: fibrin and slough  Instrument(s) utilized: curette, blade, forceps, nippers and scissors  Bleeding: small  Hemostasis obtained with: not applicable  Response to treatment: procedure was tolerated well    Debridement   Wound 02/09/24 Venous Ulcer Ankle Right;Medial    Performed by: Maegan Marquez MD  Authorized by: Maegan Marquez MD      Consent   Consent obtained? verbal  Consent given by: patient    Debridement Details  Performed by: physician  Debridement type: surgical  Level of debridement: subcutaneous tissue  Pain control: lidocaine 2%    Pre-debridement measurements  Length (cm): 0.4  Width (cm): 0.3  Depth (cm): 0.1  Surface Area (cm^2): 0.12    Post-debridement measurements  Length (cm): 0.4  Width (cm): 0.3  Depth (cm): 0.2  Percent debrided: 100%  Surface Area (cm^2): 0.12  Area Debrided (cm^2): 0.12  Volume (cm^3): 0.02    Tissue and other material debrided: subcutaneous tissue  Devitalized tissue debrided: fibrin and slough  Instrument(s) utilized: blade  Bleeding: small  Hemostasis obtained with: not applicable  Response to treatment: procedure was tolerated well    Debridement   Wound 02/19/24 Arterial Ulcer Foot Left    Performed by: Maegan Marquez  MD  Authorized by: Maegan Marquez MD      Consent   Consent obtained? verbal  Consent given by: patient    Debridement Details  Performed by: physician  Debridement type: surgical  Level of debridement: subcutaneous tissue  Pain control: lidocaine 2%    Pre-debridement measurements  Length (cm): 19.5  Width (cm): 31  Depth (cm): 0.2  Surface Area (cm^2): 604.5    Post-debridement measurements  Length (cm): 19.5  Width (cm): 31  Depth (cm): 0.3  Percent debrided: 40%  Surface Area (cm^2): 604.5  Area Debrided (cm^2): 241.8  Volume (cm^3): 181.35    Tissue and other material debrided: subcutaneous tissue  Devitalized tissue debrided: fibrin and slough  Instrument(s) utilized: curette, forceps and blade  Bleeding: small  Hemostasis obtained with: not applicable  Response to treatment: procedure was tolerated well    Debridement   Wound 02/19/24 Venous Ulcer Leg Left;Anterior    Performed by: Maegan Marquez MD  Authorized by: Maegan Marquez MD      Consent   Consent obtained? verbal  Consent given by: patient    Debridement Details  Performed by: physician  Debridement type: surgical  Level of debridement: subcutaneous tissue  Pain control: lidocaine 2%    Pre-debridement measurements  Length (cm): 12  Width (cm): 8.4  Depth (cm): 0.3  Surface Area (cm^2): 100.8    Post-debridement measurements  Length (cm): 12  Width (cm): 8.4  Depth (cm): 0.4  Percent debrided: 100%  Surface Area (cm^2): 100.8  Area Debrided (cm^2): 100.8  Volume (cm^3): 40.32    Tissue and other material debrided: subcutaneous tissue  Devitalized tissue debrided: fibrin and slough  Instrument(s) utilized: curette and forceps  Bleeding: small  Hemostasis obtained with: not applicable  Response to treatment: procedure was tolerated well    Debridement   Wound 03/21/24 Venous Ulcer Leg Right;Anterior    Performed by: Maegan Marquez MD  Authorized by: Maegan Marquez MD      Consent   Consent obtained? verbal  Consent given by:  patient    Debridement Details  Performed by: physician  Debridement type: surgical  Level of debridement: subcutaneous tissue  Pain control: lidocaine 2%    Pre-debridement measurements  Length (cm): 2.5  Width (cm): 0.5  Depth (cm): 0.1  Surface Area (cm^2): 1.25    Post-debridement measurements  Length (cm): 2.5  Width (cm): 0.4  Depth (cm): 0.2  Percent debrided: 100%  Surface Area (cm^2): 1  Area Debrided (cm^2): 1  Volume (cm^3): 0.2    Tissue and other material debrided: subcutaneous tissue  Devitalized tissue debrided: fibrin and slough  Instrument(s) utilized: curette and forceps  Bleeding: small  Hemostasis obtained with: not applicable  Response to treatment: procedure was tolerated well    Debridement   Wound Arterial Ulcer Foot Left;Plantar    Performed by: Maegan Marquez MD  Authorized by: Maegan Marquez MD      Consent   Consent obtained? verbal  Consent given by: patient    Debridement Details  Performed by: physician  Debridement type: surgical  Level of debridement: subcutaneous tissue  Pain control: lidocaine 2%    Pre-debridement measurements  Length (cm): 1.1  Width (cm): 1.4  Depth (cm): 0.1  Surface Area (cm^2): 1.54    Post-debridement measurements  Length (cm): 1.1  Width (cm): 1.5  Depth (cm): 0.1  Percent debrided: 100%  Surface Area (cm^2): 1.65  Area Debrided (cm^2): 1.65  Volume (cm^3): 0.17    Tissue and other material debrided: subcutaneous tissue  Devitalized tissue debrided: fibrin and slough  Instrument(s) utilized: curette  Bleeding: small  Hemostasis obtained with: not applicable  Response to treatment: procedure was tolerated well    Debridement   Wound Arterial Ulcer Heel Left;Lateral    Performed by: Maegan Marquez MD  Authorized by: Maegan Marquez MD      Consent   Consent obtained? verbal  Consent given by: patient    Debridement Details  Performed by: physician  Debridement type: surgical  Level of debridement: subcutaneous tissue  Pain control: lidocaine  2%    Pre-debridement measurements  Length (cm): 4.2  Width (cm): 2.5  Depth (cm): 0.2  Surface Area (cm^2): 10.5    Post-debridement measurements  Length (cm): 4.5  Width (cm): 4.5  Depth (cm): 0.4  Percent debrided: 100%  Surface Area (cm^2): 20.25  Area Debrided (cm^2): 20.25  Volume (cm^3): 8.1    Tissue and other material debrided: subcutaneous tissue  Devitalized tissue debrided: fibrin and slough  Instrument(s) utilized: curette, forceps and scissors  Bleeding: small  Hemostasis obtained with: not applicable  Response to treatment: procedure was tolerated well    Debridement   Wound 04/11/24 Venous Ulcer Leg Left;Medial    Performed by: Maegan Marquez MD  Authorized by: Maegan Marquez MD      Consent   Consent obtained? verbal  Consent given by: patient    Debridement Details  Performed by: physician  Debridement type: surgical  Level of debridement: subcutaneous tissue  Pain control: lidocaine 2%    Pre-debridement measurements  Length (cm): 2.2  Width (cm): 3  Depth (cm): 0.1  Surface Area (cm^2): 6.6    Post-debridement measurements  Length (cm): 1.6  Width (cm): 2.2  Depth (cm): 0.2  Percent debrided: 100%  Surface Area (cm^2): 3.52  Area Debrided (cm^2): 3.52  Volume (cm^3): 0.7    Tissue and other material debrided: subcutaneous tissue  Devitalized tissue debrided: fibrin and slough  Instrument(s) utilized: curette  Bleeding: small  Hemostasis obtained with: not applicable  Response to treatment: procedure was tolerated well    Debridement   Wound 04/11/24 Venous Ulcer Leg Left;Lateral    Performed by: Maegan Marquez MD  Authorized by: Maegan Marquez MD      Consent   Consent obtained? verbal  Consent given by: patient    Debridement Details  Performed by: physician  Debridement type: surgical  Level of debridement: subcutaneous tissue  Pain control: lidocaine 2%    Pre-debridement measurements  Length (cm): 2.4  Width (cm): 1.9  Depth (cm): 0.1  Surface Area (cm^2): 4.56    Post-debridement  "measurements  Length (cm): 2.4  Width (cm): 1.9  Depth (cm): 0.2  Percent debrided: 100%  Surface Area (cm^2): 4.56  Area Debrided (cm^2): 4.56  Volume (cm^3): 0.91    Tissue and other material debrided: subcutaneous tissue  Devitalized tissue debrided: fibrin and slough  Instrument(s) utilized: curette  Bleeding: small  Hemostasis obtained with: not applicable  Response to treatment: procedure was tolerated well        Assessment/Plan   Ulcers - left healing well. Continue the bertadine to the forefoot; continue the silvadene to the ulcer base. Right - change to polymem. ABD for the drainage. Latex free tubigrip. Follow up 3 weeks.   VISIT ORDERS:  Vascular Study Required: n  Labs Required: n  Radiology Imaging Required: n  Consultation Required: n  Rx Provided:   Changes to Plan of Care: y      NEW ORDERS:  Wash: anne  Irrigate/Soak:  Skin Care: A&D or aquaphor  Dressing:  left betadine moist around the forefoot; silvadene and ABD to the ulcers change daily; right - polymem and dry dressing change daily/every other day based on drainage   Compression: latex free tubigrip   Offloading:  keep away from pressure     DISCHARGE PLANNING:    Follow Up: 3 weeks  Nurse Visit: prn    General Instructions:  Center RN to apply EMLA/Lidocaine 1% jelly/LMX  topically to wound(s) prior to debridement by physician.  Center RN my see patient as a nurse consult in my absence.      RN Post Procedure Note:    BLE rewashed with warm soapy Anne, rinsed, and patted dry. A&D to the to the intact skin. Left- Betadine paint to the LLE, silvadene to the ulcers, covered with ABDs; R- Polmem to the ulcers. BLE in size \"F\" tubigrip per pt's request, pt tolerated he cannot tolerate size \"E\". Pt and family member updated on new dressing orders. Pt tolerated procedure ok.   JAMESON May RN, S. MA III    [facility]  Agency/Facility:  [facility]   days/week: 3 x week  Contacted Regarding Changes: yes      "

## 2024-04-11 NOTE — PATIENT INSTRUCTIONS
Assessment/Plan   Ulcers - left healing well. Continue the bertadine to the forefoot; continue the silvadene to the ulcer base. Right - change to polymem. ABD for the drainage. Latex free tubigrip. Follow up 3 weeks.   VISIT ORDERS:  Vascular Study Required: n  Labs Required: n  Radiology Imaging Required: n  Consultation Required: n  Rx Provided:   Changes to Plan of Care: y      NEW ORDERS:  Wash: sera  Irrigate/Soak:  Skin Care: A&D or aquaphor  Dressing:  left betadine moist around the forefoot; silvadene and ABD to the ulcers change daily; right - polymem and dry dressing change daily/every other day based on drainage   Compression: latex free tubigrip   Offloading:  keep away from pressure     DISCHARGE PLANNING:    Follow Up: 3 weeks  Nurse Visit: prn

## 2024-05-02 ENCOUNTER — OFFICE VISIT (OUTPATIENT)
Dept: WOUND CARE | Facility: CLINIC | Age: 54
End: 2024-05-02
Payer: COMMERCIAL

## 2024-05-02 VITALS
DIASTOLIC BLOOD PRESSURE: 99 MMHG | HEIGHT: 71 IN | BODY MASS INDEX: 40.88 KG/M2 | RESPIRATION RATE: 18 BRPM | HEART RATE: 101 BPM | SYSTOLIC BLOOD PRESSURE: 183 MMHG | WEIGHT: 292 LBS

## 2024-05-02 DIAGNOSIS — L97.222 CHRONIC ULCER OF LEFT CALF WITH FAT LAYER EXPOSED (MULTI): Primary | ICD-10-CM

## 2024-05-02 DIAGNOSIS — L97.523: ICD-10-CM

## 2024-05-02 DIAGNOSIS — I70.223 CRITICAL LIMB ISCHEMIA OF BOTH LOWER EXTREMITIES (MULTI): ICD-10-CM

## 2024-05-02 DIAGNOSIS — L97.212: ICD-10-CM

## 2024-05-02 DIAGNOSIS — I96 GANGRENE OF TOE OF LEFT FOOT (MULTI): ICD-10-CM

## 2024-05-02 PROCEDURE — 11045 DBRDMT SUBQ TISS EACH ADDL: CPT | Performed by: INTERNAL MEDICINE

## 2024-05-02 PROCEDURE — 11042 DBRDMT SUBQ TIS 1ST 20SQCM/<: CPT | Performed by: INTERNAL MEDICINE

## 2024-05-02 ASSESSMENT — COLUMBIA-SUICIDE SEVERITY RATING SCALE - C-SSRS
6. HAVE YOU EVER DONE ANYTHING, STARTED TO DO ANYTHING, OR PREPARED TO DO ANYTHING TO END YOUR LIFE?: NO
2. HAVE YOU ACTUALLY HAD ANY THOUGHTS OF KILLING YOURSELF?: NO
1. IN THE PAST MONTH, HAVE YOU WISHED YOU WERE DEAD OR WISHED YOU COULD GO TO SLEEP AND NOT WAKE UP?: NO

## 2024-05-02 NOTE — PATIENT INSTRUCTIONS
Assessment/Plan   Ulcers continue to heal. Will need podiatry for completion of the forefoot. Continue the silvadene and DSD. Betadine moist to the gangrenous toe. Continue the tubigrip. Keep pressure off the ulcers. Follow up 3 weeks.     VISIT ORDERS:  Vascular Study Required: n  Labs Required: n  Radiology Imaging Required: n  Consultation Required: n  Rx Provided:   Changes to Plan of Care: n      NEW ORDERS:  Wash: sera  Irrigate/Soak:  Skin Care: A&D  Dressing:  silvadene and DSD change daily  Compression:  latex free tubigrip  Offloading: keep pressure off the ulcers at all times    DISCHARGE PLANNING:    Follow Up: 3 weeks  Nurse Visit:

## 2024-05-02 NOTE — PROGRESS NOTES
"REFERRAL REQUESTED BY:  Dr. Esteban Boone    LAST SEEN:  4/11/24    Patient ID: Tim Stokes is a 53 y.o. male     HPI:     Here for follow up ambar LE ulcers. Has more odor - necrotic toes. Reports sees Dr. Boone on Tuesday.     CURRENT DRESSING:  Who is performing the dressing change:  [facility]  Dressing applied: LLE-betadine to toes, silvadene to ulcers, ABD, R-polymem, ABD, conform gauze  Dressing Frequency: 3x week    EDEMA MANAGEMENT:  Compression:  Right: Tubigrip latex free CALF 37.0   Left: Tubigrip latex free CALF 43.0     Other Modality  Sleeping in Bed: yes  Elevating FOB:  yes    Offloading/Pressure Relief  Activity Level:  ad joie  Protection Devices:  none    Offloading/Pressure Relief Effective?     ROS:      Objective     VITALS:  BP (!) 183/99 (BP Location: Left arm)   Pulse 101   Resp 18   Ht 1.803 m (5' 11\")   Wt 132 kg (292 lb)   BMI 40.73 kg/m²       Physical Exam    ARRIVAL:  Ambulating, wheelchair  TRANSFER:  One Assist, Minimal    PSYCHIATRIC:  Oriented to person, place and time: A & O x 3    CONSTITUTIONAL:    Appearance:  Well Groomed    SKIN:   chronic scarring     PULSES:     EXTREMITY TEMPERATURE:    RIGHT: normal  LEFT: normal    EDEMA: 1+ ambar    WOUND ASSESSMENT:    Wound 02/09/24 Venous Ulcer Leg Medial;Right (Active)   Date First Assessed/Time First Assessed: 02/09/24 1432   Primary Wound Type: Venous Ulcer  Location: Leg  Wound Location Orientation: Medial;Right      Assessments 2/9/2024  2:30 PM 5/2/2024  2:38 PM   Wound Image      Site Assessment -- Fibrinous;Granulation;Pink;Red   Laney-Wound Assessment -- Intact;Dry   Non-staged Wound Description -- Full thickness   Shape irregular unstageable ulcers exposed tendon   Wound Length (cm) -- 11 cm   Wound Width (cm) -- 11 cm   Wound Surface Area (cm^2) -- 121 cm^2   Wound Depth (cm) -- 0.3 cm   Wound Volume (cm^3) -- 36.3 cm^3   Wound Healing % -- -12   State of Healing Non-healing --   Wound Bed " Granulation (%) -- 10 %   Drainage Description -- Serosanguineous   Drainage Amount -- Moderate       Inactive Orders   Date Order Priority Status Authorizing Provider   04/11/24 1344 Debridement Routine Completed Maegan Marquez MD   03/21/24 1533 Debridement Routine Completed Maegan Marquez MD   03/04/24 1218 Debridement Routine Completed Maegan Marquez MD   02/19/24 1228 Debridement Routine Completed Maegan Marquez MD   02/12/24 1333 Change dressing Routine Discontinued ANURADHA Riley-DRAGAN       Wound 02/09/24 Venous Ulcer Ankle Right;Medial (Active)   Date First Assessed/Time First Assessed: 02/09/24 1433   Primary Wound Type: Venous Ulcer  Location: Ankle  Wound Location Orientation: Right;Medial      Assessments 2/9/2024  2:30 PM 5/2/2024  2:38 PM   Wound Image      Site Assessment -- Eschar   Shape irregular --   Wound Length (cm) -- 0.5 cm   Wound Width (cm) -- 0.5 cm   Wound Surface Area (cm^2) -- 0.25 cm^2   State of Healing Non-healing --   Drainage Amount -- None       Inactive Orders   Date Order Priority Status Authorizing Provider   04/11/24 1348 Debridement Routine Completed Maegan Marquez MD   02/12/24 1333 Change dressing Routine Discontinued ANURADHA Riley-DRAGAN       Wound 02/19/24 Arterial Ulcer Toe (Comment  which one) Left;Upper (Active)   Date First Assessed: 02/19/24   Primary Wound Type: Arterial Ulcer  Location: (c) Toe (Comment  which one)  Wound Location Orientation: Left;Upper      Assessments 2/19/2024 10:52 AM 5/2/2024  2:59 PM   Wound Image        Site Assessment -- Necrotic   Shape Photo documentation photo documentation       No associated orders.       Wound 02/19/24 Arterial Ulcer Toe (Comment  which one) Left (Active)   Date First Assessed: 02/19/24   Primary Wound Type: Arterial Ulcer  Location: (c) Toe (Comment  which one)  Wound Location Orientation: Left      Assessments 2/19/2024 10:54 AM 5/2/2024  2:42 PM   Wound Image        Site Assessment -- Necrotic    Shape Photo documentation photo documentation       No associated orders.       Wound 02/19/24 Arterial Ulcer Foot Left (Active)   Date First Assessed: 02/19/24   Primary Wound Type: Arterial Ulcer  Location: Foot  Wound Location Orientation: Left      Assessments 2/19/2024 11:02 AM 5/2/2024  2:49 PM   Wound Image          Site Assessment Pink;Red;Fibrinous;Eschar Fibrinous;Granulation;Pink;Red   Laney-Wound Assessment Intact Intact;Dry   Non-staged Wound Description Full thickness Full thickness   Shape CIRCUM circumferential   Wound Length (cm) 19 cm 15 cm   Wound Width (cm) 30 cm 16 cm   Wound Surface Area (cm^2) 570 cm^2 240 cm^2   Wound Depth (cm) 0.4 cm 1.5 cm   Wound Volume (cm^3) 228 cm^3 360 cm^3   Wound Healing % -- -58   Wound Bed Granulation (%) 10 % --   Drainage Description Serosanguineous;Foul odor Serosanguineous   Drainage Amount Large Large       Inactive Orders   Date Order Priority Status Authorizing Provider   04/11/24 1348 Debridement Routine Completed Maegan Marquez MD   03/21/24 1534 Debridement Routine Completed Maegan Marquez MD   03/04/24 1220 Debridement Routine Completed Maegan Marquez MD   02/19/24 1231 Debridement Routine Completed Maegan Marquez MD       Wound 02/19/24 Venous Ulcer Leg Left;Anterior (Active)   Date First Assessed: 02/19/24   Primary Wound Type: Venous Ulcer  Location: Leg  Wound Location Orientation: Left;Anterior      Assessments 2/19/2024 11:03 AM 5/2/2024  2:48 PM   Wound Image       Site Assessment Pink;Red;Fibrinous;Eschar Fibrinous;Granulation;Pink;Red   Laney-Wound Assessment Intact Intact;Dry   Non-staged Wound Description Full thickness Full thickness   Shape CIRCUM --   Wound Length (cm) 24 cm 11 cm   Wound Width (cm) 30 cm 9 cm   Wound Surface Area (cm^2) 720 cm^2 99 cm^2   Wound Depth (cm) 0.4 cm 0.2 cm   Wound Volume (cm^3) 288 cm^3 19.8 cm^3   Wound Healing % -- 93   Wound Bed Granulation (%) 10 % 25 %   Drainage Description Serosanguineous;Foul  odor Serosanguineous   Drainage Amount Large Large       Inactive Orders   Date Order Priority Status Authorizing Provider   04/11/24 1350 Debridement Routine Completed Maegan Marquez MD   03/21/24 1535 Debridement Routine Completed Maegan Marquez MD   03/04/24 1221 Debridement Routine Completed Maegan Marquez MD   02/19/24 1229 Debridement Routine Completed Maegan Marquez MD       Wound 03/21/24 Venous Ulcer Leg Right;Anterior (Active)   Date First Assessed/Time First Assessed: 03/21/24 1414   Primary Wound Type: Venous Ulcer  Location: Leg  Wound Location Orientation: Right;Anterior      Assessments 3/21/2024  2:16 PM 5/2/2024  2:38 PM   Wound Image       Site Assessment Fibrinous;Pink;Red Fibrinous;Granulation;Pink;Red;Sloughing   Laney-Wound Assessment Intact Intact   Non-staged Wound Description Full thickness Full thickness   Wound Length (cm) 1.8 cm 2.5 cm   Wound Width (cm) 0.5 cm 0.7 cm   Wound Surface Area (cm^2) 0.9 cm^2 1.75 cm^2   Wound Depth (cm) 0.1 cm 0.1 cm   Wound Volume (cm^3) 0.09 cm^3 0.175 cm^3   Wound Healing % -- -94   Wound Bed Granulation (%) 10 % 10 %   Wound Bed Slough (%) -- 90 %   Drainage Description Serosanguineous Serosanguineous   Drainage Amount Small Small       Inactive Orders   Date Order Priority Status Authorizing Provider   04/11/24 1351 Debridement Routine Completed Maegan Marquez MD       Wound Arterial Ulcer Foot Left;Plantar (Active)   No Date First Assessed or Time First Assessed found.   Primary Wound Type: Arterial Ulcer  Location: Foot  Wound Location Orientation: Left;Plantar      Assessments 4/11/2024 12:44 PM 5/2/2024  2:44 PM   Wound Image       Site Assessment Red;Fibrinous --   Laney-Wound Assessment Intact --   Non-staged Wound Description Full thickness --   Shape -- healed   Wound Length (cm) 1.1 cm --   Wound Width (cm) 1.4 cm --   Wound Surface Area (cm^2) 1.54 cm^2 --   Wound Depth (cm) 0.1 cm --   Wound Volume (cm^3) 0.154 cm^3 --   Wound Bed  Granulation (%) 90 % --   Drainage Description Serosanguineous --   Drainage Amount Small --       Inactive Orders   Date Order Priority Status Authorizing Provider   04/11/24 1352 Debridement Routine Completed Maegan Marquez MD       Wound Arterial Ulcer Heel Left;Lateral (Active)   No Date First Assessed or Time First Assessed found.   Primary Wound Type: Arterial Ulcer  Location: Heel  Wound Location Orientation: Left;Lateral      Assessments 4/11/2024 12:43 PM 5/2/2024  2:45 PM   Wound Image       Site Assessment Red;Fibrinous Fibrinous;Granulation;Pink;Red   Laney-Wound Assessment Maceration;Dry Intact;Dry   Non-staged Wound Description Full thickness Full thickness   Shape exposed tendon --   Wound Length (cm) 4.2 cm 4 cm   Wound Width (cm) 2.5 cm 1.5 cm   Wound Surface Area (cm^2) 10.5 cm^2 6 cm^2   Wound Depth (cm) 0.2 cm 0.3 cm   Wound Volume (cm^3) 2.1 cm^3 1.8 cm^3   Wound Healing % -- 14   Wound Bed Granulation (%) 25 % 25 %   Drainage Description Serosanguineous Serosanguineous   Drainage Amount Moderate Moderate       Inactive Orders   Date Order Priority Status Authorizing Provider   04/11/24 1352 Debridement Routine Completed Maegan Marquez MD       Wound 04/11/24 Venous Ulcer Leg Left;Medial (Active)   Date First Assessed: 04/11/24   Primary Wound Type: Venous Ulcer  Location: Leg  Wound Location Orientation: Left;Medial      Assessments 4/11/2024 12:52 PM 5/2/2024  2:51 PM   Wound Image       Site Assessment Red;Fibrinous Fibrinous;Granulation;Pink;Red   Laney-Wound Assessment Intact;Dry Intact;Dry   Non-staged Wound Description Full thickness Full thickness   Wound Length (cm) 2.2 cm 0.6 cm   Wound Width (cm) 3 cm 2.5 cm   Wound Surface Area (cm^2) 6.6 cm^2 1.5 cm^2   Wound Depth (cm) 0.1 cm 0.1 cm   Wound Volume (cm^3) 0.66 cm^3 0.15 cm^3   Wound Healing % -- 77   Wound Bed Granulation (%) 5 % 10 %   Drainage Description Serosanguineous Serosanguineous   Drainage Amount Small Small        Inactive Orders   Date Order Priority Status Authorizing Provider   04/11/24 1353 Debridement Routine Completed Maegan Marquez MD       Wound 04/11/24 Venous Ulcer Leg Left;Lateral (Active)   Date First Assessed: 04/11/24   Primary Wound Type: Venous Ulcer  Location: Leg  Wound Location Orientation: Left;Lateral      Assessments 4/11/2024 12:48 PM 5/2/2024  2:51 PM   Wound Image       Site Assessment Red;Fibrinous Fibrinous;Granulation;Pink;Red   Laney-Wound Assessment Intact Intact;Dry   Non-staged Wound Description Full thickness Full thickness   Wound Length (cm) 2.4 cm 0.4 cm   Wound Width (cm) 1.9 cm 0.4 cm   Wound Surface Area (cm^2) 4.56 cm^2 0.16 cm^2   Wound Depth (cm) 0.1 cm 0.1 cm   Wound Volume (cm^3) 0.456 cm^3 0.016 cm^3   Wound Healing % -- 96   Wound Bed Granulation (%) 75 % 50 %   Drainage Description Serosanguineous Serosanguineous   Drainage Amount Small Small       Inactive Orders   Date Order Priority Status Authorizing Provider   04/11/24 1354 Debridement Routine Completed Maegan Marquez MD          Debridement   Wound 02/09/24 Venous Ulcer Leg Medial;Right    Performed by: Maegan Marquez MD  Authorized by: Maegan Marquez MD      Consent   Consent obtained? verbal  Consent given by: patient    Debridement Details  Performed by: physician  Debridement type: surgical  Level of debridement: subcutaneous tissue  Pain control: lidocaine 2%  Pain control administration type: topical    Pre-debridement measurements  Length (cm): 11  Width (cm): 11  Depth (cm): 0.3  Surface Area (cm^2): 121    Post-debridement measurements  Length (cm): 11  Width (cm): 11  Depth (cm): 0.4  Percent debrided: 60%  Surface Area (cm^2): 121  Area Debrided (cm^2): 72.6  Volume (cm^3): 48.4    Tissue and other material debrided: subcutaneous tissue  Devitalized tissue debrided: fibrin, slough and dessicated tendon  Instrument(s) utilized: curette, blade, forceps and scissors  Bleeding: small  Hemostasis obtained  with: not applicable  Response to treatment: procedure was tolerated well    Debridement   Wound 02/19/24 Arterial Ulcer Toe (Comment  which one) Left    Performed by: Maegan Marquez MD  Authorized by: Maegan Marquez MD      Consent   Consent obtained? verbal  Consent given by: patient    Debridement Details  Performed by: physician  Debridement type: surgical  Level of debridement: bone  Pain control: lidocaine 2%  Pain control administration type: topical    Post-debridement measurements  Length (cm): 4  Width (cm): 2.5  Depth (cm): 1.5  Percent debrided: 100%  Surface Area (cm^2): 10  Area Debrided (cm^2): 10  Volume (cm^3): 15    Tissue and other material debrided: bone, joint capsule, ligament and tendon  Devitalized tissue debrided: fibrin, slough and necrotic toe  Instrument(s) utilized: blade, forceps, scissors and nippers  Bleeding: small  Hemostasis obtained with: pressure  Response to treatment: procedure was tolerated well    Debridement   Wound 02/19/24 Arterial Ulcer Toe (Comment  which one) Left;Upper    Performed by: Maegan Marquez MD  Authorized by: Maegan Marquez MD      Consent   Consent obtained? verbal  Consent given by: patient    Debridement Details  Performed by: physician  Debridement type: surgical  Level of debridement: subcutaneous tissue  Pain control: lidocaine 2%  Pain control administration type: topical    Post-debridement measurements  Length (cm): 5.5  Width (cm): 2.2  Depth (cm): 0.5  Percent debrided: 30%  Surface Area (cm^2): 12.1  Area Debrided (cm^2): 3.63  Volume (cm^3): 6.05    Tissue and other material debrided: subcutaneous tissue  Devitalized tissue debrided: fibrin, necrotic debris and slough  Instrument(s) utilized: curette, forceps and scissors  Bleeding: small  Hemostasis obtained with: not applicable  Response to treatment: procedure was tolerated well    Debridement   Wound 02/19/24 Arterial Ulcer Foot Left    Performed by: Maegan Marquez MD  Authorized  by: Maegan Marquez MD      Consent   Consent obtained? verbal  Consent given by: patient    Debridement Details  Performed by: physician  Debridement type: surgical  Level of debridement: subcutaneous tissue  Pain control: lidocaine 2%  Pain control administration type: topical    Pre-debridement measurements  Length (cm): 15  Width (cm): 16  Depth (cm): 1.5  Surface Area (cm^2): 240    Post-debridement measurements  Length (cm): 16.5  Width (cm): 14  Depth (cm): 0.2  Percent debrided: 60%  Surface Area (cm^2): 231  Area Debrided (cm^2): 138.6  Volume (cm^3): 46.2    Tissue and other material debrided: subcutaneous tissue  Devitalized tissue debrided: fibrin and slough  Instrument(s) utilized: curette, forceps and blade  Bleeding: small  Hemostasis obtained with: not applicable  Response to treatment: procedure was tolerated well    Debridement   Wound 02/19/24 Venous Ulcer Leg Left;Anterior    Performed by: Maegan Marquez MD  Authorized by: Maegan Marquez MD      Consent   Consent obtained? verbal  Consent given by: patient    Debridement Details  Performed by: physician  Debridement type: surgical  Level of debridement: subcutaneous tissue  Pain control: lidocaine 2%  Pain control administration type: topical    Pre-debridement measurements  Length (cm): 11  Width (cm): 9  Depth (cm): 0.2  Surface Area (cm^2): 99    Post-debridement measurements  Length (cm): 11  Width (cm): 9  Depth (cm): 0.2  Percent debrided: 60%  Surface Area (cm^2): 99  Area Debrided (cm^2): 59.4  Volume (cm^3): 19.8    Tissue and other material debrided: subcutaneous tissue  Devitalized tissue debrided: fibrin and slough  Instrument(s) utilized: scissors and curette  Bleeding: small  Hemostasis obtained with: not applicable  Response to treatment: procedure was tolerated well    Debridement   Wound 03/21/24 Venous Ulcer Leg Right;Anterior    Performed by: Maegan Marquez MD  Authorized by: Maegan Marquez MD      Consent   Consent  obtained? verbal  Consent given by: patient    Debridement Details  Performed by: physician  Debridement type: surgical  Level of debridement: subcutaneous tissue  Pain control: lidocaine 2%  Pain control administration type: topical    Pre-debridement measurements  Length (cm): 2.5  Width (cm): 0.7  Depth (cm): 0.1  Surface Area (cm^2): 1.75    Post-debridement measurements  Length (cm): 2.5  Width (cm): 0.6  Depth (cm): 0.2  Percent debrided: 100%  Surface Area (cm^2): 1.5  Area Debrided (cm^2): 1.5  Volume (cm^3): 0.3    Tissue and other material debrided: subcutaneous tissue  Devitalized tissue debrided: fibrin and slough  Instrument(s) utilized: blade  Bleeding: small  Hemostasis obtained with: not applicable  Response to treatment: procedure was tolerated well    Debridement   Wound Arterial Ulcer Heel Left;Lateral    Performed by: Maegan Marquez MD  Authorized by: Maegan Marquez MD      Consent   Consent obtained? verbal  Consent given by: patient    Debridement Details  Performed by: physician  Debridement type: surgical  Level of debridement: subcutaneous tissue  Pain control: lidocaine 2%    Pre-debridement measurements  Length (cm): 4  Width (cm): 1.5  Depth (cm): 0.3  Surface Area (cm^2): 6    Post-debridement measurements  Length (cm): 1.7  Width (cm): 2.5  Depth (cm): 0.3  Percent debrided: 100%  Surface Area (cm^2): 4.25  Area Debrided (cm^2): 4.25  Volume (cm^3): 1.27    Tissue and other material debrided: subcutaneous tissue  Comment regarding debrided tissue: epibole  Devitalized tissue debrided: fibrin and slough  Instrument(s) utilized: curette, forceps and nippers  Bleeding: small  Hemostasis obtained with: not applicable  Response to treatment: procedure was tolerated well    Debridement   Wound 04/11/24 Venous Ulcer Leg Left;Lateral    Performed by: Maegan Marquez MD  Authorized by: Maegan Marquez MD      Consent   Consent obtained? verbal  Consent given by: patient    Debridement  Details  Performed by: physician  Debridement type: surgical  Level of debridement: subcutaneous tissue  Pain control: lidocaine 2%  Pain control administration type: topical    Pre-debridement measurements  Length (cm): 0.4  Width (cm): 0.4  Depth (cm): 0.1  Surface Area (cm^2): 0.16    Post-debridement measurements  Length (cm): 0.5  Width (cm): 0.5  Depth (cm): 0.1  Percent debrided: 100%  Surface Area (cm^2): 0.25  Area Debrided (cm^2): 0.25  Volume (cm^3): 0.03    Tissue and other material debrided: subcutaneous tissue  Devitalized tissue debrided: fibrin and slough  Instrument(s) utilized: blade  Bleeding: small  Hemostasis obtained with: not applicable  Response to treatment: procedure was tolerated well        Assessment/Plan   Ulcers continue to heal. Will need podiatry for completion of the forefoot. Continue the silvadene and DSD. Betadine moist to the gangrenous toe. Continue the tubigrip. Keep pressure off the ulcers. Follow up 3 weeks.     VISIT ORDERS:  Vascular Study Required: n  Labs Required: n  Radiology Imaging Required: n  Consultation Required: n  Rx Provided:   Changes to Plan of Care: n      NEW ORDERS:  Wash: sera  Irrigate/Soak:  Skin Care: A&D  Dressing:  silvadene and DSD change daily  Compression:  latex free tubigrip  Offloading: keep pressure off the ulcers at all times    DISCHARGE PLANNING:    Follow Up: 3 weeks  Nurse Visit:    General Instructions:  Center RN to apply EMLA/Lidocaine 1% jelly/LMX  topically to wound(s) prior to debridement by physician.  Center RN my see patient as a nurse consult in my absence.      RN Post Procedure Note:      Promogran to great toe bleeding area.  Silvadene to wound beds with ABD and kerlix, latex free tubigrip.  Revision sent. SUJEY Anaya RN, FRANCISCA Doyle RN     [facility]  Agency/Facility: [facility]  days/week: 3  Contacted Regarding Changes:

## 2024-05-06 NOTE — PROGRESS NOTES
"Referred by self for PAD f/u     History of Present Illness  Tim Stokes is a 53 y.o. year old male with PMH of morbid obesity, DM2, smoker, HTN, HLD, CAD s/p CABG, HF, PAD s/p left and right iliac stentings, chronic venous dermatitis, who originally presented in January with extensive bilateral leg wounds with low EDIN/TBI, concerning for RC VI (CLTI).  - 2/9/24 s/p PTA/DCB of left CFA/SFA/popliteal, C/b distal embolization. S/p PTA and aspiration thrombectomy of Peroneal, PT(glass collateral) and AT(transcollateral) with Dr Boone.  Pt is following with Dr Marquez for wound care- Ulcers continue to heal with great progress, however toe gangrene needs to be addressed by podiatry. At his last visit with Dr Marquez, Lt gangrenous 2nd toe was amputated, need management of the 1 toe gangrene.   Pt is presenting today with 3 family members. He denies Lt foot pain (attributes to neuropathy), some Rt leg pain. Takes Tylenol. Pt is compliant with his medication regimen.    Past Medical History  History reviewed. No pertinent past medical history.    Past Surgical History  Past Surgical History:   Procedure Laterality Date    INVASIVE VASCULAR PROCEDURE N/A 2/9/2024    Procedure: Lower Extremity Angiogram;  Surgeon: Jorge Boone DO;  Location: [place] [address] Cardiac Cath Lab;  Service: Cardiovascular;  Laterality: N/A;  CPT CODE 08615       Social History  Social History     Tobacco Use    Smoking status: Every Day     Types: Cigarettes     Passive exposure: Current    Smokeless tobacco: Never    Tobacco comments:     Patient says he is trying to quit. Does not quantify how much he is smoking but says that it is just a few \"puffs here and there\"   Substance Use Topics    Alcohol use: Not Currently    Drug use: Never       Family History   No family history on file.    Review of Systems  As per HPI, all other systems reviewed and negative.    Allergies:  No Known Allergies     Outpatient Medications:  Current " Outpatient Medications   Medication Instructions    acetaminophen (TYLENOL) 1,000 mg, oral, Every 6 hours PRN    aspirin 81 mg, oral, Daily    atorvastatin (LIPITOR) 40 mg, oral, Daily    cilostazol (PLETAL) 50 mg, oral, 2 times daily    clopidogrel (PLAVIX) 75 mg, oral, Daily    glipiZIDE (GLUCOTROL) 5 mg, oral, 2 times daily    losartan (COZAAR) 50 mg, oral, Daily    oxyCODONE (ROXICODONE) 5 mg, oral, Every 6 hours PRN    semaglutide (OZEMPIC) 1 mg, subcutaneous, Every 7 days    white petrolatum (Aquaphor) 41 % ointment ointment 1 Application, Topical, Daily    Xarelto 2.5 mg, oral, 2 times daily         Vitals:  Vitals:    05/07/24 1417   BP: (!) 189/92   Pulse: 89       Physical Exam:  AOx3, NAD, morbidly obese male, sitting in wheelchair   Appropriate mood and behavior  Breathing unlabored,  COLLAZO  Skin: No  discolorations,  Extr:  No edema, bilateral legs wrapped in Unaboots. Pictures of the wounds from Dr Marquez's visit on 5/2/24 reviewed, leg wounds healing nicely. Lt hallux dry gangrene noted.     Reviewed Study(s):  Vascular Studies   Vascular US Ankle Brachial Index (EDIN) Without Exercise            [place] [address]  Tel [phone] and Fax [phone]       Vascular Lab Report  [facility] US ANKLE BRACHIAL INDEX (EDIN) WITHOUT EXERCISE       Patient Name:      KRYSTYNA Cadena Physician:  [id number] Juwan Perkins DO  Study Date:        4/11/2024            Ordering Physician: [id number] SAMUEL  MRN/PID:           [id number]             Technologist:       Haley Augustine T  Accession#:        [id number]         Technologist 2:  Date of Birth/Age: 1970 / 53 years Encounter#:         [id number]  Gender:            M  Admission Status:  Outpatient           Location Performed: [facility]       Diagnosis/ICD: Atherosclerosis of native arteries of extremities with rest pain,                 bilateral legs-I70.223  Indication:    Critical limb  ischemia, bilateral lower extremity  CPT Codes:     40105 Peripheral artery EDIN Only       Patient History Hyperlipidemia and Diabetes. S/P Left PTA and DCB of left CFA,                  SFA and Pop arteries 2/9/24.  Smoker:         Former.       **CRITICAL RESULT**  Critical Result: Severe arterial occlusive diseae in the left lower extremity at rest.  Notification called to Dr. Maegan Marquez on 4/11/2024 at 12:12:34 PM by Haley Augustine RVT.     CONCLUSIONS:  Right Lower PVR: Evidence of severe arterial occlusive disease in the right lower extremity at rest. Decreased digital perfusion noted. Monophasic flow is noted in the right posterior tibial artery and right dorsalis pedis artery. Triphasic flow is noted in the right common femoral artery. Results called by Doppler tracings and PVR waveforms due to paritially calcified vessels. Unable to obtain digit pressure due to abnormal tracings.  Left Lower PVR: Evidence of moderate arterial occlusive disease in the left lower extremity at rest. Monophasic flow is noted in the left posterior tibial artery and left dorsalis pedis artery. Biphasic flow is noted in the left common femoral artery. Results called by Doppler tracings and PVR waveforms due to paritially calcified vessels. Unable to obtain digit tracings due to ovelrying bandages.     Imaging & Doppler Findings:     RIGHT Lower PVR                Pressures Ratios  Right Posterior Tibial (Ankle) 78 mmHg   0.43  Right Dorsalis Pedis (Ankle)   74 mmHg   0.41          LEFT Lower PVR                Pressures Ratios  Left Posterior Tibial (Ankle) 158 mmHg  0.87  Left Dorsalis Pedis (Ankle)   155 mmHg  0.85                          Right     Left  Brachial Pressure 182 mmHg 171 mmHg          [id number] Juwan Perkins DO  Electronically signed by [id number] Juwan Perkins DO on 4/11/2024 at 1:31:44 PM       ** Final **       Assessment/Plan   Tim Stokes is a 53 y.o. year old male with PMH of morbid obesity, DM2, smoker, HTN, HLD,  CAD s/p CABG, HF, PAD s/p left and right iliac stentings, chronic venous dermatitis, who originally presented in January with extensive bilateral leg wounds with low EDIN/TBI, concerning for RC VI (CLTI).  - 2/9/24 s/p PTA/DCB of left CFA/SFA/popliteal, C/b distal embolization. S/p PTA and aspiration thrombectomy of Peroneal, PT(glass collateral) and AT(transcollateral) with Dr Boone.  Pt is following with Dr Marquez for wound care- Ulcers continue to heal with great progress, however toe gangrene needs to be addressed by podiatry. At his last visit with Dr Marquez, Lt gangrenous 2nd toe was amputated, need management of the 1 toe gangrene.   Pt is presenting today with 3 family members. He denies Lt foot pain (attributes to neuropathy) some Rt leg pain. Takes Tylenol. Pt is compliant with his medication regimen.  EDIN/TBI done on 4/11/24 reviewed: Rt 0.43, Lt 0.87, blood flow adequate.   Wounds are healing.  Continue with wound care per Dr Marquez.  Will refer to Dr Velasquez podiatry for toe amputation.   F/u in our clinic in 1 month (no repeat testing needed) to reassess wound healing.    Jorge Boone DO

## 2024-05-07 ENCOUNTER — OFFICE VISIT (OUTPATIENT)
Dept: CARDIOLOGY | Facility: CLINIC | Age: 54
End: 2024-05-07
Payer: COMMERCIAL

## 2024-05-07 ENCOUNTER — APPOINTMENT (OUTPATIENT)
Dept: CARDIOLOGY | Facility: CLINIC | Age: 54
End: 2024-05-07
Payer: COMMERCIAL

## 2024-05-07 VITALS
BODY MASS INDEX: 39.55 KG/M2 | SYSTOLIC BLOOD PRESSURE: 189 MMHG | HEIGHT: 72 IN | DIASTOLIC BLOOD PRESSURE: 92 MMHG | HEART RATE: 89 BPM | WEIGHT: 292 LBS

## 2024-05-07 DIAGNOSIS — I73.9 PAD (PERIPHERAL ARTERY DISEASE) (CMS-HCC): Primary | ICD-10-CM

## 2024-05-07 PROCEDURE — RXMED WILLOW AMBULATORY MEDICATION CHARGE

## 2024-05-07 PROCEDURE — 4010F ACE/ARB THERAPY RXD/TAKEN: CPT | Performed by: INTERNAL MEDICINE

## 2024-05-07 PROCEDURE — 3080F DIAST BP >= 90 MM HG: CPT | Performed by: INTERNAL MEDICINE

## 2024-05-07 PROCEDURE — 3077F SYST BP >= 140 MM HG: CPT | Performed by: INTERNAL MEDICINE

## 2024-05-07 PROCEDURE — 3051F HG A1C>EQUAL 7.0%<8.0%: CPT | Performed by: INTERNAL MEDICINE

## 2024-05-07 PROCEDURE — 99214 OFFICE O/P EST MOD 30 MIN: CPT | Performed by: INTERNAL MEDICINE

## 2024-05-09 ENCOUNTER — PHARMACY VISIT (OUTPATIENT)
Dept: PHARMACY | Facility: CLINIC | Age: 54
End: 2024-05-09
Payer: COMMERCIAL

## 2024-05-09 ENCOUNTER — PREP FOR PROCEDURE (OUTPATIENT)
Dept: WOUND CARE | Facility: CLINIC | Age: 54
End: 2024-05-09
Payer: COMMERCIAL

## 2024-05-09 ENCOUNTER — HOSPITAL ENCOUNTER (OUTPATIENT)
Facility: HOSPITAL | Age: 54
Setting detail: OUTPATIENT SURGERY
End: 2024-05-09
Attending: STUDENT IN AN ORGANIZED HEALTH CARE EDUCATION/TRAINING PROGRAM
Payer: COMMERCIAL

## 2024-05-09 DIAGNOSIS — M86.172 OSTEOMYELITIS OF FOOT, LEFT, ACUTE (MULTI): ICD-10-CM

## 2024-05-09 DIAGNOSIS — I73.9 GANGRENE DUE TO PERIPHERAL VASCULAR DISEASE (CMS-HCC): Primary | ICD-10-CM

## 2024-05-09 DIAGNOSIS — L97.922: ICD-10-CM

## 2024-05-09 RX ORDER — SODIUM CHLORIDE 9 MG/ML
100 INJECTION, SOLUTION INTRAVENOUS CONTINUOUS
Status: CANCELLED | OUTPATIENT
Start: 2024-05-09

## 2024-05-10 ENCOUNTER — PREP FOR PROCEDURE (OUTPATIENT)
Dept: PODIATRY | Facility: HOSPITAL | Age: 54
End: 2024-05-10
Payer: COMMERCIAL

## 2024-05-15 ENCOUNTER — CLINICAL SUPPORT (OUTPATIENT)
Dept: PREADMISSION TESTING | Facility: HOSPITAL | Age: 54
End: 2024-05-15
Payer: COMMERCIAL

## 2024-05-15 ASSESSMENT — ENCOUNTER SYMPTOMS
NEUROLOGICAL NEGATIVE: 1
RESPIRATORY NEGATIVE: 1
CONSTITUTIONAL NEGATIVE: 1
CARDIOVASCULAR NEGATIVE: 1
SINUS CONGESTION: 1
GASTROINTESTINAL NEGATIVE: 1

## 2024-05-15 ASSESSMENT — DUKE ACTIVITY SCORE INDEX (DASI)
CAN YOU DO MODERATE WORK AROUND THE HOUSE LIKE VACUUMING, SWEEPING FLOORS OR CARRYING GROCERIES: YES
CAN YOU WALK INDOORS, SUCH AS AROUND YOUR HOUSE: YES
CAN YOU PARTICIPATE IN MODERATE RECREATIONAL ACTIVITIES LIKE GOLF, BOWLING, DANCING, DOUBLES TENNIS OR THROWING A BASEBALL OR FOOTBALL: NO
CAN YOU PARTICIPATE IN STRENOUS SPORTS LIKE SWIMMING, SINGLES TENNIS, FOOTBALL, BASKETBALL, OR SKIING: NO
TOTAL_SCORE: 15.95
DASI METS SCORE: 4.7
CAN YOU HAVE SEXUAL RELATIONS: YES
CAN YOU CLIMB A FLIGHT OF STAIRS OR WALK UP A HILL: NO
CAN YOU RUN A SHORT DISTANCE: NO
CAN YOU DO LIGHT WORK AROUND THE HOUSE LIKE DUSTING OR WASHING DISHES: YES
CAN YOU DO HEAVY WORK AROUND THE HOUSE LIKE SCRUBBING FLOORS OR LIFTING AND MOVING HEAVY FURNITURE: NO
CAN YOU TAKE CARE OF YOURSELF (EAT, DRESS, BATHE, OR USE TOILET): YES
CAN YOU DO YARD WORK LIKE RAKING LEAVES, WEEDING OR PUSHING A MOWER: NO
CAN YOU WALK A BLOCK OR TWO ON LEVEL GROUND: NO

## 2024-05-15 NOTE — CPM/PAT NURSE NOTE
CPM/PAT Nurse Note      Name: Tim Stokes (Tim Stokes)  /Age: 1970/53 y.o. is being seen in [facility] on 2024 for Amputation Digit Foot - Left with Cecilia Velasquez DPM on 2024.    PCP: Jany Nina, [facility], LOV 2024    -EKG 2024  Sinus rhythm with 1st degree AV block  Otherwise normal ECG  When compared with ECG of 2024 16:09,  No significant change was found    WOUND: Maegan Alma, , LOV 2024 for chronic ulcer of b/l calves, gangrene left toe, ischemia of b/l LE -no longer seeing, will be seeing Dr. 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 (Multi)     Chronic ulcer of right calf with fat layer exposed (Multi)     Critical limb ischemia of both lower extremities (Multi)     Diabetic vasculopathy (Multi)     Emphysema lung (Multi)     Gangrene of toe of left foot (Multi)     Hyperlipidemia     Hypertension     Osteomyelitis (Multi)     PAD (peripheral artery disease) (CMS-HCC)     Peripheral neuropathy     Severe sepsis (Multi)     Type 2 diabetes mellitus (Multi)     Venous ulcer of left leg (Multi)     Vision loss     Wound infection        Past Surgical History:   Procedure Laterality Date    CORONARY ARTERY BYPASS GRAFT      INVASIVE VASCULAR PROCEDURE N/A 2024    Procedure: Lower Extremity Angiogram;  Surgeon: Jorge Boone DO;  Location: [place] [address] Cardiac Cath Lab;  Service: Cardiovascular;  Laterality: N/A;  CPT CODE 38261    TOE AMPUTATION         Patient  has no history on file for sexual activity.    No family history on file.    No Known Allergies    Prior to Admission medications    Medication Sig Start Date End Date Taking?  Authorizing Provider   acetaminophen (Tylenol) 500 mg tablet Take 2 tablets (1,000 mg) by mouth every 6 hours if needed for mild pain (1 - 3).   Yes Historical Provider, MD   aspirin 81 mg EC tablet Take 1 tablet (81 mg) by mouth once daily.   Yes Historical Provider, MD   atorvastatin (Lipitor) 40 mg tablet Take 1 tablet (40 mg) by mouth once daily.   Yes Historical Provider, MD   cilostazol (Pletal) 50 mg tablet Take 1 tablet (50 mg) by mouth 2 times a day. 2/13/24 2/12/25 Yes JONEL Riley   clopidogrel (Plavix) 75 mg tablet Take 1 tablet (75 mg) by mouth once daily. 2/9/24 2/8/25 Yes Stephanie Mendiola PA-C   glipiZIDE (Glucotrol) 5 mg tablet Take 1 tablet (5 mg) by mouth 2 times a day.   Yes Historical Provider, MD   losartan (Cozaar) 50 mg tablet Take 1 tablet (50 mg) by mouth once daily.   Yes Historical Provider, MD   rivaroxaban (Xarelto) 2.5 mg tablet Take 1 tablet (2.5 mg) by mouth 2 times a day. 2/9/24 2/8/25 Yes Stephanie Mendiola PA-C   semaglutide (OZEMPIC) 1 mg/dose (4 mg/3 mL) pen injector Inject 1 mg under the skin every 7 days. Mondays   Yes Historical Provider, MD   white petrolatum (Aquaphor) 41 % ointment ointment Apply 1 Application topically once daily. 2/13/24  Yes JONEL Riley   oxyCODONE (Roxicodone) 5 mg immediate release tablet Take 1 tablet (5 mg) by mouth every 6 hours if needed. 2/9/24 5/15/24  Historical Provider, MD [place] [facility]:   Constitutional:   neg    Neuro/Psych:   neg    Eyes:   Ears:   Nose:    sinus congestion  Mouth:   Throat:   neg    Neck:   Cardio:   neg    Respiratory:   neg    Endocrine:   GI:   neg    :   neg    Musculoskeletal:   Hematologic:   neg    Skin:      DASI Risk Score      Flowsheet Row Most Recent Value   DASI SCORE 15.95   METS Score (Will be calculated only when all the questions are answered) 4.7          Caprini DVT Assessment    No data to display       Modified Frailty Index    No data to display       CHADS2 Stroke Risk   "Current as of 5 hours ago        N/A 3 to 100%: High Risk   2 to < 3%: Medium Risk   0 to < 2%: Low Risk     Last Change: N/A          This score determines the patient's risk of having a stroke if the patient has atrial fibrillation.        This score is not applicable to this patient. Components are not calculated.          Revised Cardiac Risk Index    No data to display       Apfel Simplified Score    No data to display       Risk Analysis Index Results This Encounter    No data found in the last 1 encounters.         Nurse Plan of Action:     Pt is a poor historian and cannot provide any additional medical details or providers.     This RN contact Stephanie Mendiola with Dr. Boone's office regarding instructions for Plavix/Xarelto. Per Stephanie Mendiola: \"Ok to hold xarelto for 3 days. Toe amputation can be done with plavix on board. If podiatry insists on stopping plavix, then need to place on ASA 81 mg daily while off of above, resume xarelto as soon as possible.\". Dr. Martel added to communication.      Pt takes Ozempic on Mondays but reports last dose was 5/14/2024. This RN messaged Dr. Velasquez and included Dr. Martel on message as last dose recommendations would be last dose May 12, 2024.     Medication Instructions: Please stop all vitamins, supplements, and any NSAIDs (Alevel, Ibuprofen, Motrin, etc) 7 days prior to surgery.        "

## 2024-05-16 ENCOUNTER — PRE-ADMISSION TESTING (OUTPATIENT)
Dept: PREADMISSION TESTING | Facility: HOSPITAL | Age: 54
End: 2024-05-16
Payer: COMMERCIAL

## 2024-05-16 VITALS
SYSTOLIC BLOOD PRESSURE: 156 MMHG | BODY MASS INDEX: 40.6 KG/M2 | TEMPERATURE: 98.5 F | WEIGHT: 290 LBS | HEIGHT: 71 IN | HEART RATE: 89 BPM | DIASTOLIC BLOOD PRESSURE: 81 MMHG

## 2024-05-16 DIAGNOSIS — I73.9 GANGRENE DUE TO PERIPHERAL VASCULAR DISEASE (CMS-HCC): ICD-10-CM

## 2024-05-16 DIAGNOSIS — Z01.818 PREOPERATIVE CLEARANCE: Primary | ICD-10-CM

## 2024-05-16 DIAGNOSIS — M86.172 OSTEOMYELITIS OF FOOT, LEFT, ACUTE (MULTI): ICD-10-CM

## 2024-05-16 DIAGNOSIS — L97.922: ICD-10-CM

## 2024-05-16 LAB
ANION GAP SERPL CALC-SCNC: 15 MMOL/L (ref 10–20)
BUN SERPL-MCNC: 21 MG/DL (ref 6–23)
CALCIUM SERPL-MCNC: 8.7 MG/DL (ref 8.6–10.6)
CHLORIDE SERPL-SCNC: 102 MMOL/L (ref 98–107)
CO2 SERPL-SCNC: 24 MMOL/L (ref 21–32)
CREAT SERPL-MCNC: 0.56 MG/DL (ref 0.5–1.3)
EGFRCR SERPLBLD CKD-EPI 2021: >90 ML/MIN/1.73M*2
ERYTHROCYTE [DISTWIDTH] IN BLOOD BY AUTOMATED COUNT: 16.9 % (ref 11.5–14.5)
GLUCOSE SERPL-MCNC: 168 MG/DL (ref 74–99)
HCT VFR BLD AUTO: 40 % (ref 41–52)
HGB BLD-MCNC: 12.9 G/DL (ref 13.5–17.5)
MCH RBC QN AUTO: 26.8 PG (ref 26–34)
MCHC RBC AUTO-ENTMCNC: 32.3 G/DL (ref 32–36)
MCV RBC AUTO: 83 FL (ref 80–100)
NRBC BLD-RTO: 0 /100 WBCS (ref 0–0)
PLATELET # BLD AUTO: 322 X10*3/UL (ref 150–450)
POTASSIUM SERPL-SCNC: 4.3 MMOL/L (ref 3.5–5.3)
RBC # BLD AUTO: 4.81 X10*6/UL (ref 4.5–5.9)
SODIUM SERPL-SCNC: 137 MMOL/L (ref 136–145)
WBC # BLD AUTO: 8.4 X10*3/UL (ref 4.4–11.3)

## 2024-05-16 PROCEDURE — 99205 OFFICE O/P NEW HI 60 MIN: CPT | Performed by: ANESTHESIOLOGY

## 2024-05-16 PROCEDURE — 36415 COLL VENOUS BLD VENIPUNCTURE: CPT

## 2024-05-16 PROCEDURE — 80048 BASIC METABOLIC PNL TOTAL CA: CPT

## 2024-05-16 PROCEDURE — 85027 COMPLETE CBC AUTOMATED: CPT

## 2024-05-16 ASSESSMENT — ENCOUNTER SYMPTOMS
GASTROINTESTINAL NEGATIVE: 1
MYALGIAS: 1
WOUND: 1
EYES NEGATIVE: 1
CARDIOVASCULAR NEGATIVE: 1
NECK NEGATIVE: 1
ENDOCRINE NEGATIVE: 1
ARTHRALGIAS: 1
RESPIRATORY NEGATIVE: 1
NEUROLOGICAL NEGATIVE: 1

## 2024-05-16 ASSESSMENT — DUKE ACTIVITY SCORE INDEX (DASI)
CAN YOU HAVE SEXUAL RELATIONS: YES
CAN YOU DO LIGHT WORK AROUND THE HOUSE LIKE DUSTING OR WASHING DISHES: YES
CAN YOU DO HEAVY WORK AROUND THE HOUSE LIKE SCRUBBING FLOORS OR LIFTING AND MOVING HEAVY FURNITURE: NO
CAN YOU PARTICIPATE IN STRENOUS SPORTS LIKE SWIMMING, SINGLES TENNIS, FOOTBALL, BASKETBALL, OR SKIING: NO
CAN YOU DO MODERATE WORK AROUND THE HOUSE LIKE VACUUMING, SWEEPING FLOORS OR CARRYING GROCERIES: YES
CAN YOU PARTICIPATE IN MODERATE RECREATIONAL ACTIVITIES LIKE GOLF, BOWLING, DANCING, DOUBLES TENNIS OR THROWING A BASEBALL OR FOOTBALL: NO
TOTAL_SCORE: 15.95
CAN YOU WALK INDOORS, SUCH AS AROUND YOUR HOUSE: YES
CAN YOU TAKE CARE OF YOURSELF (EAT, DRESS, BATHE, OR USE TOILET): YES
CAN YOU DO YARD WORK LIKE RAKING LEAVES, WEEDING OR PUSHING A MOWER: NO
CAN YOU RUN A SHORT DISTANCE: NO
CAN YOU CLIMB A FLIGHT OF STAIRS OR WALK UP A HILL: NO
CAN YOU WALK A BLOCK OR TWO ON LEVEL GROUND: NO
DASI METS SCORE: 4.7

## 2024-05-16 ASSESSMENT — LIFESTYLE VARIABLES: SMOKING_STATUS: SMOKER

## 2024-05-16 NOTE — PREPROCEDURE INSTRUCTIONS
Thank you for visiting [facility] ([facility]) today for your pre-procedure evaluation, you were seen by Dr Geronimo ([phone])    This summary includes instructions and information to aid you during your perioperative period.  Please read carefully. If you have any questions about your visit today, please call the number listed above.  If you become ill or have any changes to your health before your surgery, please contact your primary care provider and alert your surgeon.    Preparing for your Surgery       Exercises  Preoperative Deep Breathing Exercises  Why it is important to do deep breathing exercises before my surgery?  Deep breathing exercises strengthen your breathing muscles.  This helps you to recover after your surgery and decreases the chance of breathing complications.  How are the deep breathing exercises done?  Sit straight with your back supported.  Breathe in deeply and slowly through your nose. Your lower rib cage should expand and your abdomen may move forward.  Hold that breath for 3 to 5 seconds.  Breathe out through pursed lips, slowly and completely.  Rest and repeat 10 times every hour while awake.  Rest longer if you become dizzy or lightheaded.       Incentive Spirometer   You were provided with an incentive spirometer in CPM/PAT, please follow the below instructions.  What is an incentive spirometer?  An incentive spirometer is a device used before and after surgery to “exercise” your lungs.  It helps you to take deeper breaths to expand your lungs.  Below is an example of a basic incentive spirometer.  The device you receive may differ slightly but they all function the same.    Why do I need to use an incentive spirometer?  Using your incentive spirometer prepares your lungs for surgery and helps prevent lung problems after surgery.  How do I use my incentive spirometer?  When you're using your incentive spirometer, make sure to breathe through your mouth. If  you breathe through your nose, the incentive spirometer won't work properly. You can hold your nose if you have trouble.  If you feel dizzy at any time, stop and rest. Try again at a later time.  Follow the steps below:  Set up your incentive spirometer, expand the flexible tubing and connect to the outlet.  Sit upright in a chair or bed. Hold the incentive spirometer at eye level.   Put the mouthpiece in your mouth and close your lips tightly around it. Slowly breathe out (exhale) completely.  Breathe in (inhale) slowly through your mouth as deeply as you can. As you take a breath, you will see the piston rise inside the large column. While the piston rises, the indicator should move upwards. It should stay in between the 2 arrows (see Figure).  Try to get the piston as high as you can, while keeping the indicator between the arrows.   If the indicator doesn't stay between the arrows, you're breathing either too fast or too slow.  When you get it as high as you can, hold your breath for 10 seconds, or as long as possible. While you're holding your breath, the piston will slowly fall to the base of the spirometer.  Once the piston reaches the bottom of the spirometer, breathe out slowly through your mouth. Rest for a few seconds.  Repeat 10 times. Try to get the piston to the same level with each breath.  Repeat every hour while awake  You can carefully clean the outside of the mouthpiece with an alcohol wipe or soap and water.    Sit-to-Stand Exercise    What is the sit-to-stand exercise?  The sit-to-stand exercise strengthens the muscles of your lower body and muscles in the center of your body (core muscles for stability) helping to maintain and improve your strength and mobility.  How do I do the sit-to-stand exercise?  The goal is to do this exercise without using your arms or hands.  If this is too difficult, use your arms and hands or a chair with armrests to help slowly push yourself to the standing position  and lower yourself back to the sitting position. As the movement becomes easier use your arms and hands less.    Steps to the sit-to-stand exercise  Sit up tall in a sturdy chair, knees bent, feet flat on the floor shoulder-width apart.  Shift your hips/pelvis forward in the chair to correctly position yourself for the next movement.  Lean forward at your hips.  Stand up straight putting equal weight on both feet.  Check to be sure you are properly aligned with the chair, in a slow controlled movement sit back down.  Repeat this exercise 10-15 times.  If needed you can do it fewer times until your strength improves.  Rest for 1 minute.  Do another 10-15 sit-to-stand exercises.  Try to do this in the morning and evening.        Instructions    Preoperative Fasting Guidelines    Why must I stop eating and drinking near surgery time?  With sedation, food or liquid in your stomach can enter your lungs causing serious complications  Food can increase nausea and vomiting  When do I need to stop eating and drinking before my surgery?      Do not eat any food or drink any liquids after midnight the night before your surgery/procedure.  You may have sips of water to take medications.            Simple things you can do to help prevent blood clots     Blood clots are blockages that can form in the body's veins. When a blood clot forms in your deep veins, it may be called a deep vein thrombosis, or DVT for short. Blood clots can happen in any part of the body where blood flows, but they are most common in the arms and legs. If a piece of a blood clot breaks free and travels to the lungs, it is called a pulmonary embolus (PE). A PE can be a very serious problem.         Being in the hospital or having surgery can raise your chances of getting a blood clot because you may not be well enough to move around as much as you normally do.         Ways you can help prevent blood clots in the hospital       Wearing SCDs  SCDs stands for  Sequential Compression Devices.   SCDs are special sleeves that wrap around your legs. They attach to a pump that fills them with air to gently squeeze your legs every few minutes.  This helps return the blood in your legs to your heart.   SCDs should only be taken off when walking or bathing. SCDs may not be comfortable, but they can help save your life.              Pump SCD leg sleeves  Wearing compression stockings - if your doctor orders them. These special snug-fitting stockings gently squeeze your legs to help blood flow.       Walking. Walking helps move the blood in your legs.   If your doctor says it is ok, try walking the halls at least   5 times a day. Ask us to help you get up, so you don't fall.      Taking any blood-thinning medicines your doctor orders.              Ways you can help prevent blood clots at home         Wearing compression stockings - if your doctor orders them.   Walking - to help move the blood in your legs.    Taking any blood-thinning medicines your doctor orders.      Signs of a blood clot or PE    Tell your doctor or nurse right away if you have any of the problems listed below.         If you are at home, seek medical care right away. Call 911 for chest pain or problems breathing.            Signs of a blood clot (DVT) - such as pain, swelling, redness, or warmth in your arm or legs.  Signs of a pulmonary embolism (PE) - such as chest pain or feeling short of breath      Tobacco and Alcohol;  Do not drink alcohol or smoke within 24 hours of surgery.  It is best to quit smoking for as long as possible before any surgery or procedure.    Quitting Smoking; Recognizing Dangerous Situations:  1-Alcohol use during the first month after quitting, 2-Being around smoke or someone who smokes 3-Times situation routinely smoked  4-Triggers-car, breaks, coffee, when awakening, social events  Coping Skills: 1-Learning new ways to manage stress 2-Exercising 3-Relaxation breathing   4-Change  routines 5-Distraction techniques    Websites:  Smoke-Free - offers free text messages and an nick to help you quit. Info includes eating and mood issues that may come with quitting. There is a Live Helpline to talk to an expert. Go to smokefree.gov; Become an Ex-Smoker - the free EX Plan is based on scientific research and useful advice from ex-smokers. It isn't just about quitting smoking.  It's about re-learning life without cigarettes using a 3-step program.  Go to becomeanex.Vook   Centers for Disease Control offer many suggestions for helping you quit. Includes a Quit Guide and real-life stories. There are sections for specific groups such as LGBT, , different ethnic groups, and pregnant women.  Go to cdc.gov/tobacco/campaign/tips    Other Resources:  [place] Tobacco Quit Line - call 1-800QUIT-NOW or 1-642.412.2997.  [facility] 2-1-1 - to find local programs and resources. Call 211 or go to [employer].  [facility] Tobacco Cessation Program - call 586-041-9304.  American Lung Association offers classes for quitting smoking. Some places may charge a fee. For a list of classes, go to lung.org or call 8-723-LUNGNtirety.     Some things to think about:; The health benefits of quitting smoking can help most of the major parts of your body. There is no safe amount of cigarette smoke. Quitting smoking can add years to your life. When you quit, you'll also protect your loved ones from dangerous secondhand smoke. Make a plan, join a support group, and talk to your physician to assist in quitting smoking.                                                                                                                    The Week before Surgery        Seven days before Surgery  Check your CPM medication instructions  Do the exercises provided to you by CPM   Arrange for a responsible, adult licensed  to take you home after surgery and stay with you for 24 hours.  You will not be permitted to drive yourself  home if you have received any anesthetic/sedation  Six days before surgery  Check your CPM medication instructions  Do the exercises provided to you by CPM   Start using Chlorhexidene (CHG) body wash if prescribed  Five days before surgery  Check your CPM medication instructions  Do the exercises provided to you by CPM   Continue to use CHG body wash if prescribed  Three days before surgery  Check your CPM medication instructions  Do the exercises provided to you by CPM   Continue to use CHG body wash if prescribed  Two days before surgery  Check your CPM medication instructions  Do the exercises provided to you by CPM   Continue to use CHG body wash if prescribed    The Day before Surgery       Check your CPM medication and all other CPM instructions including when to stop eating and drinking  You will be called with your arrival time for surgery in the late afternoon.  If you do not receive a call please reach out to your surgeon's office.  Do not smoke or drink 24 hours before surgery  Prepare items to bring with you to the hospital  Shower with your chlorhexidine wash if prescribed  Brush your teeth and use your chlorhexidine dental rinse if prescribed    The Day of Surgery       Check your CPM medication instructions  Ensure you follow the instructions for when to stop eating and drinking  Shower, if prescribed use CHG.  Do not apply any lotions, creams, moisturizers, perfume or deodorant  Brush your teeth and use your CHG dental rinse if prescribed  Wear loose comfortable clothing  Avoid make-up  Remove  jewelry and piercings, consider professional piercing removal with a plastic spacer if needed  Bring photo ID and Insurance card  Bring an accurate medication list that includes medication dose, frequency and allergies  Bring a copy of your advanced directives (will, health care power of )  Bring any devices and controllers as well as medical devices you have been provided with for surgery (CPAP,  slings, braces, etc.)  Dentures, eyeglasses, and contacts will be removed before surgery, please bring cases for contacts or glasses

## 2024-05-16 NOTE — H&P (VIEW-ONLY)
CPM/PAT Evaluation       Name: Tim Stokes (Craig Adorni)  /Age: 1970/53 y.o.     In-Person       Chief Complaint: Evaluation prior to surgery     HPI  53 year old male scheduled for L Amputation Digit Foot on 2024 with Dr. Velasquez for  L toe gangrene.  PMHX includes HTN, HLD, CAD sp CABG (3/2022), DM2, PAD sp right and left iliac stents.  Presents to [facility] today for perioperative risk stratification and optimization    Past Medical History:   Diagnosis Date    Acute decompensated heart failure (Multi)     Atherosclerosis of both lower extremities with bilateral ulceration (Multi)     Chronic ulcer of left calf with fat layer exposed (Multi)     Chronic ulcer of left foot with necrosis of muscle (Multi)     Chronic ulcer of right calf with fat layer exposed (Multi)     Critical limb ischemia of both lower extremities (Multi)     Diabetic vasculopathy (Multi)     Emphysema lung (Multi)     Gangrene of toe of left foot (Multi)     Hyperlipidemia     Hypertension     Osteomyelitis (Multi)     PAD (peripheral artery disease) ([facility])     Peripheral neuropathy     Severe sepsis (Multi)     Type 2 diabetes mellitus (Multi)     Venous ulcer of left leg (Multi)     Vision loss     Wound infection        Past Surgical History:   Procedure Laterality Date    CORONARY ARTERY BYPASS GRAFT      INVASIVE VASCULAR PROCEDURE N/A 2024    Procedure: Lower Extremity Angiogram;  Surgeon: Jorge Boone DO;  Location: [place] [address] Cardiac Cath Lab;  Service: Cardiovascular;  Laterality: N/A;  CPT CODE 32274    TOE AMPUTATION         Patient  has no history on file for sexual activity.    No family history on file.    No Known Allergies    Prior to Admission medications    Medication Sig Start Date End Date Taking? Authorizing Provider   acetaminophen (Tylenol) 500 mg tablet Take 2 tablets (1,000 mg) by mouth every 6 hours if needed for mild pain (1 - 3).    Historical Provider, MD   aspirin 81 mg EC tablet Take 1  tablet (81 mg) by mouth once daily.    Historical Provider, MD   atorvastatin (Lipitor) 40 mg tablet Take 1 tablet (40 mg) by mouth once daily.    Historical Provider, MD   cilostazol (Pletal) 50 mg tablet Take 1 tablet (50 mg) by mouth 2 times a day. 2/13/24 2/12/25  JONEL Riley   clopidogrel (Plavix) 75 mg tablet Take 1 tablet (75 mg) by mouth once daily. 2/9/24 2/8/25  ONEIL Bermeo   glipiZIDE (Glucotrol) 5 mg tablet Take 1 tablet (5 mg) by mouth 2 times a day.    Historical Provider, MD   losartan (Cozaar) 50 mg tablet Take 1 tablet (50 mg) by mouth once daily.    Historical Provider, MD   rivaroxaban (Xarelto) 2.5 mg tablet Take 1 tablet (2.5 mg) by mouth 2 times a day. 2/9/24 2/8/25  ONEIL Bermeo   semaglutide (OZEMPIC) 1 mg/dose (4 mg/3 mL) pen injector Inject 1 mg under the skin every 7 days. Mondays    Historical Provider, MD   white petrolatum (Aquaphor) 41 % ointment ointment Apply 1 Application topically once daily. 2/13/24   JONEL Riley   oxyCODONE (Roxicodone) 5 mg immediate release tablet Take 1 tablet (5 mg) by mouth every 6 hours if needed. 2/9/24 5/15/24  Historical Provider, MD [place] [facility]:   Constitutional:   Neuro/Psych:   neg    Eyes:   neg    Ears:   neg    Nose:   Mouth:   Throat:   neg    Neck:   neg    Cardio:   neg    Respiratory:   neg    Endocrine:   neg    GI:   neg    :   neg    Musculoskeletal:    arthralgias   myalgias  Hematologic:   neg    Skin:   sores/wound      Physical Exam  Constitutional:       Appearance: Normal appearance. He is normal weight.   HENT:      Head: Normocephalic.      Nose: Nose normal.   Eyes:      Extraocular Movements: Extraocular movements intact.      Pupils: Pupils are equal, round, and reactive to light.   Cardiovascular:      Rate and Rhythm: Normal rate and regular rhythm.   Pulmonary:      Effort: Pulmonary effort is normal.      Breath sounds: Normal breath sounds.   Abdominal:      General: Abdomen is  flat.      Palpations: Abdomen is soft.   Musculoskeletal:         General: Normal range of motion.   Skin:     Findings: Bruising and lesion present.   Neurological:      General: No focal deficit present.      Mental Status: He is alert and oriented to person, place, and time. Mental status is at baseline.         PAT AIRWAY:   Airway:     Mallampati::  III    TM distance::  >3 FB    Neck ROM::  Full    There were no vitals taken for this visit.    DASI Risk Score    No data to display       Caprini DVT Assessment    No data to display       Modified Frailty Index    No data to display       CHADS2 Stroke Risk  Current as of about an hour ago        N/A 3 to 100%: High Risk   2 to < 3%: Medium Risk   0 to < 2%: Low Risk     Last Change: N/A          This score determines the patient's risk of having a stroke if the patient has atrial fibrillation.        This score is not applicable to this patient. Components are not calculated.          Revised Cardiac Risk Index    No data to display       Apfel Simplified Score    No data to display       Risk Analysis Index Results This Encounter    No data found in the last 1 encounters.         Assessment and Plan:     No results found for this or any previous visit (from the past 24 hour(s)).     Assessment & Plan:    53 y.o.  male scheduled for L Amputation Digit Foot on 5/16/2024 with Dr. Velasquez for  L toe gangrene.  PMHX includes HTN, HLD, CAD sp CABG (3/2022), DM2, PAD sp right and left iliac stents.  Presents to CPM today for perioperative risk stratification and optimization    Neuro:  No neurologic diagnosis, however, the patient is at increased risk for perioperative delirium secondary to depression, polypharmacy  Patient is at increased risk for perioperative CVA secondary to perioperative interruption of antithrombotic, perioperative interruption of anticoagulant, HTN, DM, increased age    HEENT:  No HEENT diagnosis or significant findings on chart review or  clinical presentation and evaluation. No further preoperative testing/intervention indicated at this time.    Cardiovascular:  HTN on losartan   HLD on atorvastatin  CAD sp CABG (3/2022): complicated by sternal would infection, required bring back to the OR to remove wires. Patient developed afib in the setting of sepsis post cardiac surgery. No recurrence of afib since then  Echocardiography (05/18/2022) LVEF 60 +/- 5%, normal RV  METS: 4.7  RCRI: 1 point, 6.0% risk for postoperative MACE   KAYA: 0.9% risk for 30 day postoperative MACE  EKG (2/9/2024)- Sinus rhythm with 1st degree AV block     Pulmonary:  COPD: ex smoker stopped 3 months ago   No pulmonary diagnosis, however patient is at increased risk of perioperative complications secondary to COPD, Tobacco abuse, functional dependence  Stop Bang score is 6 placing patient at high risk for ALONSO  ARISCAT: <26 points, 1.6% risk of in-hospital postoperative pulmonary complication  PRODIGY: High risk for opioid induced respiratory depression  Smoking cessation discussed with patient, patient also provided cessation education/hotline handout, [facility] toilet education discussed, patient also provided deep breathing exercises and incentive spirometry educational handout    Renal:   No renal diagnosis, however patient is at increase risk for perioperative renal complications secondary to  BMI equal to or greater than 30, HTN, PAD    Endocrine:  DM2: on glepzide and ozempic. Patient took ozempic on 5/12/2024. Recommended to stop ozempic 7 days prior to surgery in order to decrease the risk of aspiration.     Hematologic:  No hematologic diagnosis, however patient is at an increased risk for DVT  Caprini Score 6, patient at Moderate risk for perioperative DVT.  Patient provided with VTE education/handout.    Vascular: PAD sp right and left iliac stents. Complicated by lower extremity ulcers and left toe gangrene. Patient currently on xarelto and plavix. Ok to stop Xarelto  3 days prior to surgery per cardiology. Continue plavix.     Gastrointestinal:   No GI diagnosis or significant findings on chart review or clinical presentation and evaluation.   Eat-10 score 0  Apfel 0    Infectious disease:   No infectious diagnosis or significant findings on chart review or clinical presentation and evaluation.     Musculoskeletal:   No diagnosis or significant findings on chart review or clinical presentation and evaluation.     Anesthesia/Airway:  No anesthesia complications    Medication instructions and NPO guidelines reviewed with the patient.  All questions or concerns discussed and addressed.      Patient seen and staffed with Dr. Aguilar

## 2024-05-16 NOTE — CPM/PAT H&P
CPM/PAT Evaluation       Name: Tim Stokes (Craig Adorni)  /Age: 1970/53 y.o.     In-Person       Chief Complaint: Evaluation prior to surgery     HPI  53 year old male scheduled for L Amputation Digit Foot on 2024 with Dr. Velasquez for  L toe gangrene.  PMHX includes HTN, HLD, CAD sp CABG (3/2022), DM2, PAD sp right and left iliac stents.  Presents to [facility] today for perioperative risk stratification and optimization    Past Medical History:   Diagnosis Date    Acute decompensated heart failure (Multi)     Atherosclerosis of both lower extremities with bilateral ulceration (Multi)     Chronic ulcer of left calf with fat layer exposed (Multi)     Chronic ulcer of left foot with necrosis of muscle (Multi)     Chronic ulcer of right calf with fat layer exposed (Multi)     Critical limb ischemia of both lower extremities (Multi)     Diabetic vasculopathy (Multi)     Emphysema lung (Multi)     Gangrene of toe of left foot (Multi)     Hyperlipidemia     Hypertension     Osteomyelitis (Multi)     PAD (peripheral artery disease) (CMS-[facility])     Peripheral neuropathy     Severe sepsis (Multi)     Type 2 diabetes mellitus (Multi)     Venous ulcer of left leg (Multi)     Vision loss     Wound infection        Past Surgical History:   Procedure Laterality Date    CORONARY ARTERY BYPASS GRAFT      INVASIVE VASCULAR PROCEDURE N/A 2024    Procedure: Lower Extremity Angiogram;  Surgeon: Jorge Boone DO;  Location: [place] [address] Cardiac Cath Lab;  Service: Cardiovascular;  Laterality: N/A;  CPT CODE 24281    TOE AMPUTATION         Patient  has no history on file for sexual activity.    No family history on file.    No Known Allergies    Prior to Admission medications    Medication Sig Start Date End Date Taking? Authorizing Provider   acetaminophen (Tylenol) 500 mg tablet Take 2 tablets (1,000 mg) by mouth every 6 hours if needed for mild pain (1 - 3).    Historical Provider, MD   aspirin 81 mg EC tablet Take 1  tablet (81 mg) by mouth once daily.    Historical Provider, MD   atorvastatin (Lipitor) 40 mg tablet Take 1 tablet (40 mg) by mouth once daily.    Historical Provider, MD   cilostazol (Pletal) 50 mg tablet Take 1 tablet (50 mg) by mouth 2 times a day. 2/13/24 2/12/25  JONEL Riley   clopidogrel (Plavix) 75 mg tablet Take 1 tablet (75 mg) by mouth once daily. 2/9/24 2/8/25  ONEIL Bermeo   glipiZIDE (Glucotrol) 5 mg tablet Take 1 tablet (5 mg) by mouth 2 times a day.    Historical Provider, MD   losartan (Cozaar) 50 mg tablet Take 1 tablet (50 mg) by mouth once daily.    Historical Provider, MD   rivaroxaban (Xarelto) 2.5 mg tablet Take 1 tablet (2.5 mg) by mouth 2 times a day. 2/9/24 2/8/25  ONEIL Bermeo   semaglutide (OZEMPIC) 1 mg/dose (4 mg/3 mL) pen injector Inject 1 mg under the skin every 7 days. Mondays    Historical Provider, MD   white petrolatum (Aquaphor) 41 % ointment ointment Apply 1 Application topically once daily. 2/13/24   JONEL Riley   oxyCODONE (Roxicodone) 5 mg immediate release tablet Take 1 tablet (5 mg) by mouth every 6 hours if needed. 2/9/24 5/15/24  Historical Provider, MD [facility] ROS:   Constitutional:   Neuro/Psych:   neg    Eyes:   neg    Ears:   neg    Nose:   Mouth:   Throat:   neg    Neck:   neg    Cardio:   neg    Respiratory:   neg    Endocrine:   neg    GI:   neg    :   neg    Musculoskeletal:    arthralgias   myalgias  Hematologic:   neg    Skin:   sores/wound      Physical Exam  Constitutional:       Appearance: Normal appearance. He is normal weight.   HENT:      Head: Normocephalic.      Nose: Nose normal.   Eyes:      Extraocular Movements: Extraocular movements intact.      Pupils: Pupils are equal, round, and reactive to light.   Cardiovascular:      Rate and Rhythm: Normal rate and regular rhythm.   Pulmonary:      Effort: Pulmonary effort is normal.      Breath sounds: Normal breath sounds.   Abdominal:      General: Abdomen is  flat.      Palpations: Abdomen is soft.   Musculoskeletal:         General: Normal range of motion.   Skin:     Findings: Bruising and lesion present.   Neurological:      General: No focal deficit present.      Mental Status: He is alert and oriented to person, place, and time. Mental status is at baseline.         PAT AIRWAY:   Airway:     Mallampati::  III    TM distance::  >3 FB    Neck ROM::  Full    There were no vitals taken for this visit.    DASI Risk Score    No data to display       Caprini DVT Assessment    No data to display       Modified Frailty Index    No data to display       CHADS2 Stroke Risk  Current as of about an hour ago        N/A 3 to 100%: High Risk   2 to < 3%: Medium Risk   0 to < 2%: Low Risk     Last Change: N/A          This score determines the patient's risk of having a stroke if the patient has atrial fibrillation.        This score is not applicable to this patient. Components are not calculated.          Revised Cardiac Risk Index    No data to display       Apfel Simplified Score    No data to display       Risk Analysis Index Results This Encounter    No data found in the last 1 encounters.         Assessment and Plan:     No results found for this or any previous visit (from the past 24 hour(s)).     Assessment & Plan:    53 y.o.  male scheduled for L Amputation Digit Foot on 5/16/2024 with Dr. Velasquez for  L toe gangrene.  PMHX includes HTN, HLD, CAD sp CABG (3/2022), DM2, PAD sp right and left iliac stents.  Presents to CPM today for perioperative risk stratification and optimization    Neuro:  No neurologic diagnosis, however, the patient is at increased risk for perioperative delirium secondary to depression, polypharmacy  Patient is at increased risk for perioperative CVA secondary to perioperative interruption of antithrombotic, perioperative interruption of anticoagulant, HTN, DM, increased age    HEENT:  No HEENT diagnosis or significant findings on chart review or  clinical presentation and evaluation. No further preoperative testing/intervention indicated at this time.    Cardiovascular:  HTN on losartan   HLD on atorvastatin  CAD sp CABG (3/2022): complicated by sternal would infection, required bring back to the OR to remove wires. Patient developed afib in the setting of sepsis post cardiac surgery. No recurrence of afib since then  Echocardiography (05/18/2022) LVEF 60 +/- 5%, normal RV  METS: 4.7  RCRI: 1 point, 6.0% risk for postoperative MACE   KAYA: 0.9% risk for 30 day postoperative MACE  EKG (2/9/2024)- Sinus rhythm with 1st degree AV block     Pulmonary:  COPD: ex smoker stopped 3 months ago   No pulmonary diagnosis, however patient is at increased risk of perioperative complications secondary to COPD, Tobacco abuse, functional dependence  Stop Bang score is 6 placing patient at high risk for ALONSO  ARISCAT: <26 points, 1.6% risk of in-hospital postoperative pulmonary complication  PRODIGY: High risk for opioid induced respiratory depression  Smoking cessation discussed with patient, patient also provided cessation education/hotline handout, [facility] toilet education discussed, patient also provided deep breathing exercises and incentive spirometry educational handout    Renal:   No renal diagnosis, however patient is at increase risk for perioperative renal complications secondary to  BMI equal to or greater than 30, HTN, PAD    Endocrine:  DM2: on glepzide and ozempic. Patient took ozempic on 5/12/2024. Recommended to stop ozempic 7 days prior to surgery in order to decrease the risk of aspiration.     Hematologic:  No hematologic diagnosis, however patient is at an increased risk for DVT  Caprini Score 6, patient at Moderate risk for perioperative DVT.  Patient provided with VTE education/handout.    Vascular: PAD sp right and left iliac stents. Complicated by lower extremity ulcers and left toe gangrene. Patient currently on xarelto and plavix. Ok to stop Xarelto  3 days prior to surgery per cardiology. Continue plavix.     Gastrointestinal:   No GI diagnosis or significant findings on chart review or clinical presentation and evaluation.   Eat-10 score 0  Apfel 0    Infectious disease:   No infectious diagnosis or significant findings on chart review or clinical presentation and evaluation.     Musculoskeletal:   No diagnosis or significant findings on chart review or clinical presentation and evaluation.     Anesthesia/Airway:  No anesthesia complications    Medication instructions and NPO guidelines reviewed with the patient.  All questions or concerns discussed and addressed.      Patient seen and staffed with Dr. Aguilar

## 2024-05-17 ENCOUNTER — PREP FOR PROCEDURE (OUTPATIENT)
Dept: PODIATRY | Facility: HOSPITAL | Age: 54
End: 2024-05-17
Payer: COMMERCIAL

## 2024-05-17 ENCOUNTER — ANESTHESIA EVENT (OUTPATIENT)
Dept: OPERATING ROOM | Facility: HOSPITAL | Age: 54
End: 2024-05-17

## 2024-05-20 ENCOUNTER — ANESTHESIA (OUTPATIENT)
Dept: OPERATING ROOM | Facility: HOSPITAL | Age: 54
End: 2024-05-20
Payer: COMMERCIAL

## 2024-05-23 ENCOUNTER — ANESTHESIA EVENT (OUTPATIENT)
Dept: OPERATING ROOM | Facility: HOSPITAL | Age: 54
End: 2024-05-23
Payer: COMMERCIAL

## 2024-05-23 ENCOUNTER — APPOINTMENT (OUTPATIENT)
Dept: WOUND CARE | Facility: CLINIC | Age: 54
End: 2024-05-23
Payer: COMMERCIAL

## 2024-05-24 ENCOUNTER — HOSPITAL ENCOUNTER (OUTPATIENT)
Facility: HOSPITAL | Age: 54
Setting detail: OUTPATIENT SURGERY
Discharge: HOME | End: 2024-05-24
Attending: STUDENT IN AN ORGANIZED HEALTH CARE EDUCATION/TRAINING PROGRAM | Admitting: STUDENT IN AN ORGANIZED HEALTH CARE EDUCATION/TRAINING PROGRAM
Payer: COMMERCIAL

## 2024-05-24 ENCOUNTER — ANESTHESIA (OUTPATIENT)
Dept: OPERATING ROOM | Facility: HOSPITAL | Age: 54
End: 2024-05-24
Payer: COMMERCIAL

## 2024-05-24 VITALS
BODY MASS INDEX: 40.74 KG/M2 | TEMPERATURE: 98.6 F | WEIGHT: 291.01 LBS | HEIGHT: 71 IN | DIASTOLIC BLOOD PRESSURE: 79 MMHG | OXYGEN SATURATION: 97 % | HEART RATE: 74 BPM | SYSTOLIC BLOOD PRESSURE: 169 MMHG | RESPIRATION RATE: 16 BRPM

## 2024-05-24 DIAGNOSIS — I73.9 GANGRENE DUE TO PERIPHERAL VASCULAR DISEASE (CMS-HCC): ICD-10-CM

## 2024-05-24 DIAGNOSIS — I96 GANGRENE OF TOE OF LEFT FOOT (MULTI): Primary | ICD-10-CM

## 2024-05-24 DIAGNOSIS — M86.172 OSTEOMYELITIS OF FOOT, LEFT, ACUTE (MULTI): ICD-10-CM

## 2024-05-24 DIAGNOSIS — L97.922: ICD-10-CM

## 2024-05-24 LAB
GLUCOSE BLD MANUAL STRIP-MCNC: 144 MG/DL (ref 74–99)
GLUCOSE BLD MANUAL STRIP-MCNC: 175 MG/DL (ref 74–99)

## 2024-05-24 PROCEDURE — 87070 CULTURE OTHR SPECIMN AEROBIC: CPT | Mod: AHULAB | Performed by: STUDENT IN AN ORGANIZED HEALTH CARE EDUCATION/TRAINING PROGRAM

## 2024-05-24 PROCEDURE — 82947 ASSAY GLUCOSE BLOOD QUANT: CPT

## 2024-05-24 PROCEDURE — A28810 PR AMPUTATION METATARSAL+TOE,SINGLE: Performed by: STUDENT IN AN ORGANIZED HEALTH CARE EDUCATION/TRAINING PROGRAM

## 2024-05-24 PROCEDURE — 2500000004 HC RX 250 GENERAL PHARMACY W/ HCPCS (ALT 636 FOR OP/ED): Performed by: NURSE ANESTHETIST, CERTIFIED REGISTERED

## 2024-05-24 PROCEDURE — 2500000001 HC RX 250 WO HCPCS SELF ADMINISTERED DRUGS (ALT 637 FOR MEDICARE OP): Performed by: STUDENT IN AN ORGANIZED HEALTH CARE EDUCATION/TRAINING PROGRAM

## 2024-05-24 PROCEDURE — 2500000004 HC RX 250 GENERAL PHARMACY W/ HCPCS (ALT 636 FOR OP/ED): Performed by: STUDENT IN AN ORGANIZED HEALTH CARE EDUCATION/TRAINING PROGRAM

## 2024-05-24 PROCEDURE — 3600000008 HC OR TIME - EACH INCREMENTAL 1 MINUTE - PROCEDURE LEVEL THREE: Performed by: STUDENT IN AN ORGANIZED HEALTH CARE EDUCATION/TRAINING PROGRAM

## 2024-05-24 PROCEDURE — 7100000010 HC PHASE TWO TIME - EACH INCREMENTAL 1 MINUTE: Performed by: STUDENT IN AN ORGANIZED HEALTH CARE EDUCATION/TRAINING PROGRAM

## 2024-05-24 PROCEDURE — 2780000003 HC OR 278 NO HCPCS: Performed by: STUDENT IN AN ORGANIZED HEALTH CARE EDUCATION/TRAINING PROGRAM

## 2024-05-24 PROCEDURE — C1762 CONN TISS, HUMAN(INC FASCIA): HCPCS | Performed by: STUDENT IN AN ORGANIZED HEALTH CARE EDUCATION/TRAINING PROGRAM

## 2024-05-24 PROCEDURE — 7100000002 HC RECOVERY ROOM TIME - EACH INCREMENTAL 1 MINUTE: Performed by: STUDENT IN AN ORGANIZED HEALTH CARE EDUCATION/TRAINING PROGRAM

## 2024-05-24 PROCEDURE — 3600000003 HC OR TIME - INITIAL BASE CHARGE - PROCEDURE LEVEL THREE: Performed by: STUDENT IN AN ORGANIZED HEALTH CARE EDUCATION/TRAINING PROGRAM

## 2024-05-24 PROCEDURE — 3700000002 HC GENERAL ANESTHESIA TIME - EACH INCREMENTAL 1 MINUTE: Performed by: STUDENT IN AN ORGANIZED HEALTH CARE EDUCATION/TRAINING PROGRAM

## 2024-05-24 PROCEDURE — 2500000005 HC RX 250 GENERAL PHARMACY W/O HCPCS: Performed by: STUDENT IN AN ORGANIZED HEALTH CARE EDUCATION/TRAINING PROGRAM

## 2024-05-24 PROCEDURE — 3700000001 HC GENERAL ANESTHESIA TIME - INITIAL BASE CHARGE: Performed by: STUDENT IN AN ORGANIZED HEALTH CARE EDUCATION/TRAINING PROGRAM

## 2024-05-24 PROCEDURE — 7100000001 HC RECOVERY ROOM TIME - INITIAL BASE CHARGE: Performed by: STUDENT IN AN ORGANIZED HEALTH CARE EDUCATION/TRAINING PROGRAM

## 2024-05-24 PROCEDURE — 2720000007 HC OR 272 NO HCPCS: Performed by: STUDENT IN AN ORGANIZED HEALTH CARE EDUCATION/TRAINING PROGRAM

## 2024-05-24 PROCEDURE — A28810 PR AMPUTATION METATARSAL+TOE,SINGLE: Performed by: NURSE ANESTHETIST, CERTIFIED REGISTERED

## 2024-05-24 PROCEDURE — 7100000009 HC PHASE TWO TIME - INITIAL BASE CHARGE: Performed by: STUDENT IN AN ORGANIZED HEALTH CARE EDUCATION/TRAINING PROGRAM

## 2024-05-24 PROCEDURE — 2500000005 HC RX 250 GENERAL PHARMACY W/O HCPCS: Performed by: NURSE ANESTHETIST, CERTIFIED REGISTERED

## 2024-05-24 DEVICE — IMPLANTABLE DEVICE: Type: IMPLANTABLE DEVICE | Site: FOOT | Status: FUNCTIONAL

## 2024-05-24 RX ORDER — HYDRALAZINE HYDROCHLORIDE 20 MG/ML
5 INJECTION INTRAMUSCULAR; INTRAVENOUS EVERY 30 MIN PRN
Status: DISCONTINUED | OUTPATIENT
Start: 2024-05-24 | End: 2024-05-24 | Stop reason: HOSPADM

## 2024-05-24 RX ORDER — LABETALOL HYDROCHLORIDE 5 MG/ML
5 INJECTION, SOLUTION INTRAVENOUS ONCE AS NEEDED
Status: DISCONTINUED | OUTPATIENT
Start: 2024-05-24 | End: 2024-05-24 | Stop reason: HOSPADM

## 2024-05-24 RX ORDER — HYDRALAZINE HYDROCHLORIDE 20 MG/ML
INJECTION INTRAMUSCULAR; INTRAVENOUS AS NEEDED
Status: DISCONTINUED | OUTPATIENT
Start: 2024-05-24 | End: 2024-05-24

## 2024-05-24 RX ORDER — ONDANSETRON HYDROCHLORIDE 2 MG/ML
INJECTION, SOLUTION INTRAVENOUS AS NEEDED
Status: DISCONTINUED | OUTPATIENT
Start: 2024-05-24 | End: 2024-05-24

## 2024-05-24 RX ORDER — LIDOCAINE HYDROCHLORIDE 10 MG/ML
0.1 INJECTION, SOLUTION EPIDURAL; INFILTRATION; INTRACAUDAL; PERINEURAL ONCE
Status: DISCONTINUED | OUTPATIENT
Start: 2024-05-24 | End: 2024-05-24 | Stop reason: HOSPADM

## 2024-05-24 RX ORDER — SODIUM CHLORIDE, SODIUM LACTATE, POTASSIUM CHLORIDE, CALCIUM CHLORIDE 600; 310; 30; 20 MG/100ML; MG/100ML; MG/100ML; MG/100ML
100 INJECTION, SOLUTION INTRAVENOUS CONTINUOUS
Status: DISCONTINUED | OUTPATIENT
Start: 2024-05-24 | End: 2024-05-24 | Stop reason: HOSPADM

## 2024-05-24 RX ORDER — ONDANSETRON HYDROCHLORIDE 2 MG/ML
4 INJECTION, SOLUTION INTRAVENOUS ONCE AS NEEDED
Status: DISCONTINUED | OUTPATIENT
Start: 2024-05-24 | End: 2024-05-24 | Stop reason: HOSPADM

## 2024-05-24 RX ORDER — DEXMEDETOMIDINE IN 0.9 % NACL 20 MCG/5ML
SYRINGE (ML) INTRAVENOUS AS NEEDED
Status: DISCONTINUED | OUTPATIENT
Start: 2024-05-24 | End: 2024-05-24

## 2024-05-24 RX ORDER — DIPHENHYDRAMINE HYDROCHLORIDE 50 MG/ML
12.5 INJECTION INTRAMUSCULAR; INTRAVENOUS ONCE AS NEEDED
Status: DISCONTINUED | OUTPATIENT
Start: 2024-05-24 | End: 2024-05-24 | Stop reason: HOSPADM

## 2024-05-24 RX ORDER — CEFAZOLIN 1 G/1
INJECTION, POWDER, FOR SOLUTION INTRAVENOUS AS NEEDED
Status: DISCONTINUED | OUTPATIENT
Start: 2024-05-24 | End: 2024-05-24

## 2024-05-24 RX ORDER — ALBUTEROL SULFATE 0.83 MG/ML
2.5 SOLUTION RESPIRATORY (INHALATION) ONCE AS NEEDED
Status: DISCONTINUED | OUTPATIENT
Start: 2024-05-24 | End: 2024-05-24 | Stop reason: HOSPADM

## 2024-05-24 RX ORDER — OXYCODONE HYDROCHLORIDE 5 MG/1
5 TABLET ORAL EVERY 4 HOURS PRN
Status: DISCONTINUED | OUTPATIENT
Start: 2024-05-24 | End: 2024-05-24 | Stop reason: HOSPADM

## 2024-05-24 RX ORDER — LIDOCAINE HYDROCHLORIDE 20 MG/ML
INJECTION, SOLUTION EPIDURAL; INFILTRATION; INTRACAUDAL; PERINEURAL AS NEEDED
Status: DISCONTINUED | OUTPATIENT
Start: 2024-05-24 | End: 2024-05-24

## 2024-05-24 RX ORDER — OXYCODONE AND ACETAMINOPHEN 5; 325 MG/1; MG/1
1 TABLET ORAL EVERY 6 HOURS PRN
Qty: 21 TABLET | Refills: 0 | Status: SHIPPED | OUTPATIENT
Start: 2024-05-24

## 2024-05-24 RX ORDER — LABETALOL HYDROCHLORIDE 5 MG/ML
INJECTION, SOLUTION INTRAVENOUS AS NEEDED
Status: DISCONTINUED | OUTPATIENT
Start: 2024-05-24 | End: 2024-05-24

## 2024-05-24 RX ORDER — TRAMADOL HYDROCHLORIDE 50 MG/1
50 TABLET ORAL EVERY 6 HOURS PRN
Qty: 20 TABLET | Refills: 0 | Status: SHIPPED | OUTPATIENT
Start: 2024-05-24

## 2024-05-24 RX ORDER — DROPERIDOL 2.5 MG/ML
0.62 INJECTION, SOLUTION INTRAMUSCULAR; INTRAVENOUS ONCE AS NEEDED
Status: DISCONTINUED | OUTPATIENT
Start: 2024-05-24 | End: 2024-05-24 | Stop reason: HOSPADM

## 2024-05-24 RX ORDER — MEPERIDINE HYDROCHLORIDE 25 MG/ML
12.5 INJECTION INTRAMUSCULAR; INTRAVENOUS; SUBCUTANEOUS EVERY 10 MIN PRN
Status: DISCONTINUED | OUTPATIENT
Start: 2024-05-24 | End: 2024-05-24 | Stop reason: HOSPADM

## 2024-05-24 RX ORDER — FENTANYL CITRATE 50 UG/ML
INJECTION, SOLUTION INTRAMUSCULAR; INTRAVENOUS AS NEEDED
Status: DISCONTINUED | OUTPATIENT
Start: 2024-05-24 | End: 2024-05-24

## 2024-05-24 RX ORDER — SODIUM CHLORIDE 9 MG/ML
100 INJECTION, SOLUTION INTRAVENOUS CONTINUOUS
Status: DISCONTINUED | OUTPATIENT
Start: 2024-05-24 | End: 2024-05-24 | Stop reason: HOSPADM

## 2024-05-24 RX ORDER — MIDAZOLAM HYDROCHLORIDE 1 MG/ML
INJECTION INTRAMUSCULAR; INTRAVENOUS AS NEEDED
Status: DISCONTINUED | OUTPATIENT
Start: 2024-05-24 | End: 2024-05-24

## 2024-05-24 RX ORDER — PROPOFOL 10 MG/ML
INJECTION, EMULSION INTRAVENOUS CONTINUOUS PRN
Status: DISCONTINUED | OUTPATIENT
Start: 2024-05-24 | End: 2024-05-24

## 2024-05-24 RX ORDER — OXYCODONE HYDROCHLORIDE 5 MG/1
10 TABLET ORAL EVERY 4 HOURS PRN
Status: DISCONTINUED | OUTPATIENT
Start: 2024-05-24 | End: 2024-05-24 | Stop reason: HOSPADM

## 2024-05-24 RX ADMIN — HYDROMORPHONE HYDROCHLORIDE 0.4 MG: 1 INJECTION, SOLUTION INTRAMUSCULAR; INTRAVENOUS; SUBCUTANEOUS at 12:20

## 2024-05-24 RX ADMIN — Medication 8 MCG: at 09:54

## 2024-05-24 RX ADMIN — PROPOFOL 3.9 MG: 10 INJECTION, EMULSION INTRAVENOUS at 09:57

## 2024-05-24 RX ADMIN — Medication 40 MG: at 09:36

## 2024-05-24 RX ADMIN — PROPOFOL 40 MG: 10 INJECTION, EMULSION INTRAVENOUS at 09:36

## 2024-05-24 RX ADMIN — HYDRALAZINE HYDROCHLORIDE 10 MG: 20 INJECTION INTRAMUSCULAR; INTRAVENOUS at 11:26

## 2024-05-24 RX ADMIN — Medication 4 MCG: at 10:17

## 2024-05-24 RX ADMIN — HYDROMORPHONE HYDROCHLORIDE 0.4 MG: 1 INJECTION, SOLUTION INTRAMUSCULAR; INTRAVENOUS; SUBCUTANEOUS at 11:43

## 2024-05-24 RX ADMIN — LIDOCAINE HYDROCHLORIDE 50 MG: 20 INJECTION, SOLUTION EPIDURAL; INFILTRATION; INTRACAUDAL; PERINEURAL at 09:29

## 2024-05-24 RX ADMIN — HYDROMORPHONE HYDROCHLORIDE 0.4 MG: 1 INJECTION, SOLUTION INTRAMUSCULAR; INTRAVENOUS; SUBCUTANEOUS at 11:54

## 2024-05-24 RX ADMIN — FENTANYL CITRATE 100 MCG: 50 INJECTION, SOLUTION INTRAMUSCULAR; INTRAVENOUS at 09:23

## 2024-05-24 RX ADMIN — SODIUM CHLORIDE, POTASSIUM CHLORIDE, SODIUM LACTATE AND CALCIUM CHLORIDE 100 ML/HR: 600; 310; 30; 20 INJECTION, SOLUTION INTRAVENOUS at 12:30

## 2024-05-24 RX ADMIN — SODIUM CHLORIDE, SODIUM LACTATE, POTASSIUM CHLORIDE, AND CALCIUM CHLORIDE: 600; 310; 30; 20 INJECTION, SOLUTION INTRAVENOUS at 09:32

## 2024-05-24 RX ADMIN — GLYCOPYRROLATE 0.2 MG: 0.2 INJECTION, SOLUTION INTRAMUSCULAR; INTRAVITREAL at 09:58

## 2024-05-24 RX ADMIN — OXYCODONE HYDROCHLORIDE 10 MG: 5 TABLET ORAL at 12:49

## 2024-05-24 RX ADMIN — LABETALOL HYDROCHLORIDE 10 MG: 5 INJECTION INTRAVENOUS at 11:21

## 2024-05-24 RX ADMIN — Medication 8 MCG: at 10:19

## 2024-05-24 RX ADMIN — Medication 12 MCG: at 11:21

## 2024-05-24 RX ADMIN — LABETALOL HYDROCHLORIDE 10 MG: 5 INJECTION INTRAVENOUS at 09:43

## 2024-05-24 RX ADMIN — CEFAZOLIN 3 G: 330 INJECTION, POWDER, FOR SOLUTION INTRAMUSCULAR; INTRAVENOUS at 09:30

## 2024-05-24 RX ADMIN — Medication 10 MG: at 09:33

## 2024-05-24 RX ADMIN — PROPOFOL 200 MCG/KG/MIN: 10 INJECTION, EMULSION INTRAVENOUS at 09:28

## 2024-05-24 RX ADMIN — PROPOFOL 40 MG: 10 INJECTION, EMULSION INTRAVENOUS at 09:29

## 2024-05-24 RX ADMIN — MIDAZOLAM HYDROCHLORIDE 2 MG: 1 INJECTION, SOLUTION INTRAMUSCULAR; INTRAVENOUS at 09:23

## 2024-05-24 RX ADMIN — PROPOFOL 30 MG: 10 INJECTION, EMULSION INTRAVENOUS at 09:40

## 2024-05-24 RX ADMIN — Medication 8 MCG: at 11:22

## 2024-05-24 RX ADMIN — ONDANSETRON 4 MG: 2 INJECTION INTRAMUSCULAR; INTRAVENOUS at 09:57

## 2024-05-24 ASSESSMENT — PAIN DESCRIPTION - LOCATION
LOCATION: FOOT

## 2024-05-24 ASSESSMENT — PAIN DESCRIPTION - DESCRIPTORS
DESCRIPTORS: BURNING;THROBBING
DESCRIPTORS: DULL;THROBBING
DESCRIPTORS: DULL;THROBBING
DESCRIPTORS: BURNING;THROBBING

## 2024-05-24 ASSESSMENT — PAIN SCALES - GENERAL
PAINLEVEL_OUTOF10: 6
PAINLEVEL_OUTOF10: 0 - NO PAIN
PAINLEVEL_OUTOF10: 7
PAINLEVEL_OUTOF10: 10 - WORST POSSIBLE PAIN
PAINLEVEL_OUTOF10: 4
PAINLEVEL_OUTOF10: 9
PAINLEVEL_OUTOF10: 9
PAINLEVEL_OUTOF10: 5 - MODERATE PAIN
PAINLEVEL_OUTOF10: 10 - WORST POSSIBLE PAIN
PAINLEVEL_OUTOF10: 4
PAINLEVEL_OUTOF10: 10 - WORST POSSIBLE PAIN
PAINLEVEL_OUTOF10: 6
PAIN_LEVEL: 4

## 2024-05-24 ASSESSMENT — PAIN - FUNCTIONAL ASSESSMENT
PAIN_FUNCTIONAL_ASSESSMENT: VAS (VISUAL ANALOG SCALE)
PAIN_FUNCTIONAL_ASSESSMENT: 0-10

## 2024-05-24 ASSESSMENT — PAIN DESCRIPTION - ORIENTATION
ORIENTATION: RIGHT;LEFT

## 2024-05-24 NOTE — ANESTHESIA POSTPROCEDURE EVALUATION
Patient: Tim Stokes    Procedure Summary       Date: 05/24/24 Room / Location: [facility] A OR 05 / Virtual [facility] A OR    Anesthesia Start: 0920 Anesthesia Stop: 1137    Procedures:       Left Foot Digit Amputation (Left: Foot)      Bilatetal Feet Bone Debridement; Bone Biopsy (Bilateral: Foot)      Bilateral Feet Allograft Application (Bilateral: Foot) Diagnosis:       Gangrene due to peripheral vascular disease (CMS-HCC)      Osteomyelitis of foot, left, acute (Multi)      Chronic ulcer of leg, left, with fat layer exposed (Multi)      (Gangrene due to peripheral vascular disease (CMS-HCC) [I73.9])      (Osteomyelitis of foot, left, acute (Multi) [M86.172])      (Chronic ulcer of leg, left, with fat layer exposed (Multi) [L97.922])    Surgeons: Cecilia Velasquez DPM Responsible Provider: Peggy Bueno MD    Anesthesia Type: general ASA Status: 4            Anesthesia Type: general    Vitals Value Taken Time   /70 05/24/24 1134   Temp 36 05/24/24 1138   Pulse 69 05/24/24 1138   Resp 16 05/24/24 1138   SpO2 100 % 05/24/24 1138   Vitals shown include unfiled device data.    Anesthesia Post Evaluation    Patient location during evaluation: PACU  Patient participation: complete - patient participated  Level of consciousness: sleepy but conscious  Pain score: 4  Pain management: adequate  Airway patency: patent  Cardiovascular status: acceptable  Respiratory status: acceptable and face mask  Hydration status: acceptable  Postoperative Nausea and Vomiting: none        No notable events documented.

## 2024-05-24 NOTE — ANESTHESIA PROCEDURE NOTES
Airway  Date/Time: 5/24/2024 9:36 AM  Urgency: elective    Airway not difficult    Staffing  Performed: CRNA   Authorized by: Peggy Bueno MD    Performed by: TIMOTHY Saldivar  Patient location during procedure: OR    Indications and Patient Condition  Indications for airway management: anesthesia  Spontaneous ventilation: present  Sedation level: deep  Preoxygenated: yes  Patient position: sniffing  MILS maintained throughout      Final Airway Details  Final airway type: supraglottic airway      Successful airway: unique  Size 5     Number of attempts at approach: 1

## 2024-05-24 NOTE — ANESTHESIA PREPROCEDURE EVALUATION
Patient: Tim Stokes    Procedure Information       Date/Time: 05/24/24 0830    Procedures:       Left Foot Digit Amputation (Left: Foot)      Bilatetal Feet Bone Debridement; Bone Biopsy (Bilateral: Foot)      Bilateral Feet Allograft Application (Bilateral: Foot)    Location: [facility] A OR 05 / Virtual [facility] A OR    Surgeons: Cecilia Velasquez DPM            Relevant Problems   Cardiac   (+) Atherosclerosis of both lower extremities with bilateral ulceration (Multi)   (+) Atherosclerosis of native artery of left lower extremity with ulceration of midfoot (Multi)   (+) Critical limb ischemia of both lower extremities (Multi)   (+) Essential hypertension, benign   (+) Hypercholesteremia   (+) PAD (peripheral artery disease) (CMS-HCC)      Pulmonary   (+) Centrilobular emphysema (Multi)      Endocrine   (+) Morbid obesity due to excess calories (Multi)      ID   (+) Osteomyelitis of foot, left, acute (Multi)   (+) Wound infection       Clinical information reviewed:   Tobacco  Allergies  Meds   Med Hx  Surg Hx   Fam Hx  Soc Hx        NPO Detail:  NPO/Void Status  Carbohydrate Drink Given Prior to Surgery? : N  Date of Last Liquid: 05/24/24  Time of Last Liquid: 0530  Date of Last Solid: 05/23/24  Time of Last Solid: 1900  Last Intake Type: Clear fluids (water)  Time of Last Void: 0600         Physical Exam    Airway  Mallampati: III  TM distance: >3 FB  Neck ROM: full     Cardiovascular   Rhythm: regular  Rate: normal     Dental - normal exam     Pulmonary - normal exam     Abdominal   (+) obese             Anesthesia Plan    History of general anesthesia?: yes  History of complications of general anesthesia?: no    ASA 4     MAC     intravenous induction   Anesthetic plan and risks discussed with patient.  Use of blood products discussed with patient who.    Plan discussed with CRNA and attending.

## 2024-05-24 NOTE — DISCHARGE INSTRUCTIONS
Please keep dressing clean, dry, and intact until you see Dr. Velasquez. Patient able to be partial weightbearing to the left heel and full weight bearing to the right foot. Both feet in a surgical shoe. Please rest, Ice, and elevate legs when sitting, reclining, or lying down.

## 2024-05-25 NOTE — OP NOTE
Left Foot Digit Amputation (L), Bilatetal Feet Bone Debridement; Bone Biopsy (B), Bilateral Feet Allograft Application (B) Operative Note     Date: 2024  OR Location: [facility] A OR    Name: Tim Stokes, : 1970, Age: 53 y.o., MRN: [id number], Sex: male    Diagnosis  Pre-op Diagnosis     * Gangrene due to peripheral vascular disease (CMS-[facility]) [I73.9]     * Osteomyelitis of foot, left, acute (Multi) [M86.172]     * Chronic ulcer of leg, left, with fat layer exposed (Multi) [L97.922] Post-op Diagnosis     * Gangrene due to peripheral vascular disease (CMS-[facility]) [I73.9]     * Osteomyelitis of foot, left, acute (Multi) [M86.172]     * Chronic ulcer of leg, left, with fat layer exposed (Multi) [L97.922]     Procedures  Left Foot Digit Amputation  76044 - [place] AMPUTATION TOE METATARSOPHALANGEAL JOINT    Bilatetal Feet Bone Debridement; Bone Biopsy  21167 - [place] INCISION BONE CORTEX FOOT    Bilatetal Feet Bone Debridement; Bone Biopsy  55454 - [place] DEBRIDEMENT SUBCUTANEOUS TISSUE 1ST 20 SQ CM/<    Bilatetal Feet Bone Debridement; Bone Biopsy  13144 - [place] DEBRIDEMENT MUSCLE &/FASCIA 1ST 20 SQ CM/<    Bilateral Feet Allograft Application  40868 - [place] HAYES SKN SUB GRFT T/A/L AREA/100SQ CM /<1ST 25    Bilateral Feet Allograft Application  03148 - [place] SUB GRFT F/S/N/H/F/G/M/D <100SQ CM 1ST 25 SQ CM      Surgeons      * Cecilia Velasquez - Primary    Resident/Fellow/Other Assistant:  Surgeons and Role:  * No surgeons found with a matching role *    Procedure Summary  Anesthesia: Consult  ASA: IV  Anesthesia Staff: Anesthesiologist: Peggy Bueno MD  CRNA: ANURADHA Saldivar-CRNA  Estimated Blood Loss: 300 mL  Intra-op Medications: Administrations occurring from 0830 to 1030 on 24:  * No intraprocedure medications in log *           Anesthesia Record               Intraprocedure I/O Totals          Intake    LR bolus 900.00 mL    Propofol Drip 0.00 mL    The total shown is the total volume documented since Anesthesia  Start was filed.    Total Intake 900 mL          Specimen:   ID Type Source Tests Collected by Time   A : POST LAVAGE CULTURE OF LEFT FOOT Swab FOOT AMPUTATION LEFT TISSUE/WOUND CULTURE/SMEAR Cecilia Velasquez DPM 2024 1048        Staff:   Circulator: Dorothy Yost Person: Lexie Beard Circulator: Shamir Beard Scrub: Julieta         Drains and/or Catheters: * None in log *    Tourniquet Times:   * Missing tourniquet times found for documented tourniquets in lo *     Implants:  Implants       Type Name Action Serial No.      Graft AXIOFILL, 2000MG - [id number] - [id number] Implanted -[id number]              Findings: The patient was transferred from the OR to PACU with all vital signs stable and vascular status unchanged to the right and left lower extremity.    Indications: Tim Stokes is an 53 y.o. male who is having surgery for Gangrene due to peripheral vascular disease (CMS-HCC) [I73.9]  Osteomyelitis of foot, left, acute (Multi) [M86.172]  Chronic ulcer of leg, left, with fat layer exposed (Multi) [L97.922].     The patient was seen in the preoperative area. The risks, benefits, complications, treatment options, non-operative alternatives, expected recovery and outcomes were discussed with the patient. The possibilities of reaction to medication, pulmonary aspiration, injury to surrounding structures, bleeding, recurrent infection, the need for additional procedures, failure to diagnose a condition, and creating a complication requiring transfusion or operation were discussed with the patient. The patient concurred with the proposed plan, giving informed consent.  The site of surgery was properly noted/marked if necessary per policy. The patient has been actively warmed in preoperative area. Preoperative antibiotics have been ordered and given within 2 hours of incision. Venous thrombosis prophylaxis are not indicated.    Procedure Details:     Indication for  Operation:    This patient presents for podiatric surgical intervention today. Patient has had unsuccessful conservative treatment in the past and wishes to have surgical intervention. All of the patient's questions have been answered and concerns have been appropriately addressed including an explanation of risks and benefits of today's planned surgical intervention. The patient states that have been NPO since midnight. An updated H&P and consent have been completed prior to today's surgical intervention.    Operative Report:    The patient was transferred from the preop holding area to the OR and placed on the OR table in a secure supine position. Following IV sedation local anesthesia was obtained utilizing 10ccs of a 1:1 mixture of 1% lidocaine plain and 0.5% marcaine plain in ankle block fashion to left leg. The right and left lower extremity was prepped and draped in sterile aseptic technique. An appropriate timeout was performed all in the room were in agreement.    Attention was directed to the left forefoot where the left hallux was noted to be gangrenous secondary to peripheral vascular disease and severely abducted. Prior acquired transmetatarsal amputation noted to digits 2-4 with exposed muscle, bones noted to be soft. An incision was made at a level of the metatarsals such that this incision allowed adequate coverage of the remaining metatarsal bones for closure while still allowing for resection of the infected bone. This incision was deepened to the level of the bone and utilizing sharp dissection, the distal aspects of the metatarsals were removed after a sagittal saw was used to perform the osteotomy of the metatarsals in a manner that allowed for the maintenance of a parabola-shape of the metatarsals. It was clear after resection of the bone that the remaining bone exhibited good bone bleeding and cortex displayed appropriate hardness and strength. Utilizing a rongeur and other instrumentation,  all devitalized soft tissue was removed from the area and a bone rasp was used to smooth the remaining surfaces so as to leave no sharp edges at the end of the bones. The wound was debrided using Misonix with a rake handpiece attachment and a post debridement culture taken Bovie and dirk powder with applied pressure used to control bleeding. Closure was obtained with 2-0 prolene and staples. A Prevena wound vac placed to incisional site. The Prevena wound vac was noted to be fully functioning at 125 mmHg with no breaks in the seal.     Wound #1:   Noted to the right medial aspect of the leg was debrided wound was debrided using Misonix with a rake handpiece attachment. Wound was debrided down to and including the level of muscle with post debridement measures approximately 10 cm x 6 cm x 0.3 cm. Application of Allograft. Covered with adaptic and steri strips.    Wound #2:   Lateral aspect of left leg was debrided wound was debrided using Misonix with a rake handpiece attachment. Wound was debrided down to including the level of fat with post debridement measures approximately 9.5 cm x 5.3 cm x 0.2 cm. Application of Allograft. Covered with adaptic and steri strips.    Wound #3:   Medial aspect of right leg was debrided wound was debrided using Misonix with a rake handpiece attachment. Wound was debrided down to including the level of fat with post debridement measures approximately 8 cm x 3.5 cm x 0.2 cm. Application of Allograft. Covered with adaptic and steri strips.    A dry, sterile dressing was applied consisting of Adaptic, 4x4s, abdominal padding, Webril, Ace wrap, webril, and a compression ace  wrap. The patient tolerated the procedure and anesthesia well and without complication.    The patient was transferred from the OR to PACU with all vital signs stable and vascular status unchanged to the right and left lower extremity.      Complications:  None; patient tolerated the procedure well.    Disposition:  PACU - hemodynamically stable.  Condition: stable           Attending Attestation:     Cecilia Velasquez  Phone Number: [phone]

## 2024-05-26 LAB
BACTERIA SPEC CULT: ABNORMAL
GRAM STN SPEC: ABNORMAL
GRAM STN SPEC: ABNORMAL

## 2024-05-30 ENCOUNTER — OFFICE VISIT (OUTPATIENT)
Dept: WOUND CARE | Facility: CLINIC | Age: 54
End: 2024-05-30
Payer: COMMERCIAL

## 2024-05-30 PROCEDURE — 99213 OFFICE O/P EST LOW 20 MIN: CPT

## 2024-05-30 PROCEDURE — 11045 DBRDMT SUBQ TISS EACH ADDL: CPT

## 2024-05-30 PROCEDURE — 11042 DBRDMT SUBQ TIS 1ST 20SQCM/<: CPT

## 2024-06-06 ENCOUNTER — OFFICE VISIT (OUTPATIENT)
Dept: WOUND CARE | Facility: CLINIC | Age: 54
End: 2024-06-06
Payer: COMMERCIAL

## 2024-06-06 PROCEDURE — 97606 NEG PRS WND THER DME>50 SQCM: CPT

## 2024-06-06 PROCEDURE — 11043 DBRDMT MUSC&/FSCA 1ST 20/<: CPT

## 2024-06-06 PROCEDURE — 11046 DBRDMT MUSC&/FSCA EA ADDL: CPT

## 2024-06-06 PROCEDURE — 11042 DBRDMT SUBQ TIS 1ST 20SQCM/<: CPT | Mod: 59

## 2024-06-06 PROCEDURE — 11045 DBRDMT SUBQ TISS EACH ADDL: CPT

## 2024-06-10 PROCEDURE — RXMED WILLOW AMBULATORY MEDICATION CHARGE

## 2024-06-11 ENCOUNTER — PHARMACY VISIT (OUTPATIENT)
Dept: PHARMACY | Facility: CLINIC | Age: 54
End: 2024-06-11
Payer: COMMERCIAL

## 2024-06-13 ENCOUNTER — OFFICE VISIT (OUTPATIENT)
Dept: WOUND CARE | Facility: CLINIC | Age: 54
End: 2024-06-13
Payer: COMMERCIAL

## 2024-06-13 PROCEDURE — 11043 DBRDMT MUSC&/FSCA 1ST 20/<: CPT

## 2024-06-13 PROCEDURE — 11046 DBRDMT MUSC&/FSCA EA ADDL: CPT

## 2024-06-20 ENCOUNTER — OFFICE VISIT (OUTPATIENT)
Dept: WOUND CARE | Facility: CLINIC | Age: 54
End: 2024-06-20
Payer: COMMERCIAL

## 2024-06-20 DIAGNOSIS — Z89.432: Primary | ICD-10-CM

## 2024-06-20 DIAGNOSIS — I70.223 CRITICAL LIMB ISCHEMIA OF BOTH LOWER EXTREMITIES (MULTI): ICD-10-CM

## 2024-06-27 ENCOUNTER — OFFICE VISIT (OUTPATIENT)
Dept: WOUND CARE | Facility: CLINIC | Age: 54
End: 2024-06-27
Payer: COMMERCIAL

## 2024-06-27 PROCEDURE — 11042 DBRDMT SUBQ TIS 1ST 20SQCM/<: CPT

## 2024-06-27 PROCEDURE — 11045 DBRDMT SUBQ TISS EACH ADDL: CPT

## 2024-07-04 PROCEDURE — RXMED WILLOW AMBULATORY MEDICATION CHARGE

## 2024-07-11 ENCOUNTER — APPOINTMENT (OUTPATIENT)
Dept: WOUND CARE | Facility: CLINIC | Age: 54
End: 2024-07-11
Payer: COMMERCIAL

## 2024-07-11 DIAGNOSIS — L97.509 TOE ULCER (MULTI): ICD-10-CM

## 2024-07-11 DIAGNOSIS — I70.222 CRITICAL LIMB ISCHEMIA OF LEFT LOWER EXTREMITY (MULTI): Primary | ICD-10-CM

## 2024-07-16 ENCOUNTER — APPOINTMENT (OUTPATIENT)
Dept: CARDIOLOGY | Facility: CLINIC | Age: 54
End: 2024-07-16
Payer: COMMERCIAL

## 2024-07-16 ENCOUNTER — HOSPITAL ENCOUNTER (OUTPATIENT)
Dept: VASCULAR MEDICINE | Facility: HOSPITAL | Age: 54
Discharge: HOME | End: 2024-07-16
Payer: COMMERCIAL

## 2024-07-16 VITALS
HEART RATE: 90 BPM | SYSTOLIC BLOOD PRESSURE: 167 MMHG | DIASTOLIC BLOOD PRESSURE: 94 MMHG | BODY MASS INDEX: 40.59 KG/M2 | HEIGHT: 71 IN

## 2024-07-16 DIAGNOSIS — I73.9 PAD (PERIPHERAL ARTERY DISEASE) (CMS-HCC): Primary | ICD-10-CM

## 2024-07-16 DIAGNOSIS — I70.222 CRITICAL LIMB ISCHEMIA OF LEFT LOWER EXTREMITY (MULTI): ICD-10-CM

## 2024-07-16 DIAGNOSIS — I73.9 PERIPHERAL VASCULAR DISEASE, UNSPECIFIED (CMS-HCC): ICD-10-CM

## 2024-07-16 DIAGNOSIS — L97.509 TOE ULCER (MULTI): ICD-10-CM

## 2024-07-16 PROCEDURE — 3051F HG A1C>EQUAL 7.0%<8.0%: CPT | Performed by: INTERNAL MEDICINE

## 2024-07-16 PROCEDURE — 93922 UPR/L XTREMITY ART 2 LEVELS: CPT

## 2024-07-16 PROCEDURE — 4010F ACE/ARB THERAPY RXD/TAKEN: CPT | Performed by: INTERNAL MEDICINE

## 2024-07-16 PROCEDURE — 3077F SYST BP >= 140 MM HG: CPT | Performed by: INTERNAL MEDICINE

## 2024-07-16 PROCEDURE — 3080F DIAST BP >= 90 MM HG: CPT | Performed by: INTERNAL MEDICINE

## 2024-07-16 PROCEDURE — 93922 UPR/L XTREMITY ART 2 LEVELS: CPT | Performed by: SURGERY

## 2024-07-16 PROCEDURE — 99213 OFFICE O/P EST LOW 20 MIN: CPT | Performed by: INTERNAL MEDICINE

## 2024-07-16 ASSESSMENT — PAIN SCALES - GENERAL: PAINLEVEL: 0-NO PAIN

## 2024-07-17 NOTE — PROGRESS NOTES
"Chief Complaint:   Follow-up     History Of Present Illness:    Tim Stokes is a 54 y.o. male with PMH of morbid obesity, DM2, smoker, HTN, HLD, CAD s/p CABG, HF, PAD s/p left and right iliac stentings, chronic venous dermatitis, who originally presented in January with extensive bilateral leg wounds with low EDIN/TBI, concerning for RC VI (CLTI).  - 2/9/24 s/p PTA/DCB of left CFA/SFA/popliteal, C/b distal embolization. S/p PTA and aspiration thrombectomy of Peroneal, PT(glass collateral) and AT(transcollateral) with Dr Boone.  Pt was following with Dr Marquez for wound care but now follows with Dr. Cecilia Velasquez because of reduced travel time from his home to her medical office.  Left leg ulcers continue to heal with good progress and is s/p amputation of L 1st and 2nd toe for gangrene.  Pt is worried about Right LE disease. He has a subjective feeling that his Right Leg circulation is improving based upon his observation of improved color and improving symptoms. However, he was concerned with the EDIN results showing severe disease of the RLE.     Last Recorded Vitals:  Vitals:    07/16/24 1306   BP: (!) 167/94   Pulse: 90   Height: 1.803 m (5' 11\")       Past Medical History:  He has a past medical history of Acute decompensated heart failure (Multi), Atherosclerosis of both lower extremities with bilateral ulceration (Multi), Chronic ulcer of left calf with fat layer exposed (Multi), Chronic ulcer of left foot with necrosis of muscle (Multi), Chronic ulcer of right calf with fat layer exposed (Multi), Critical limb ischemia of both lower extremities (Multi), Diabetic vasculopathy (Multi), Emphysema lung (Multi), Gangrene of toe of left foot (Multi), Hyperlipidemia, Hypertension, Osteomyelitis (Multi), PAD (peripheral artery disease) (CMS-HCC), Peripheral neuropathy, Severe sepsis (Multi), Type 2 diabetes mellitus (Multi), Venous ulcer of left leg (Multi), Vision loss, and Wound infection.    Past Surgical " History:  He has a past surgical history that includes Invasive Vascular Procedure (N/A, 02/09/2024); Coronary artery bypass graft; and Toe amputation.      Social History:  He reports that he has been smoking cigarettes. He has been exposed to tobacco smoke. He has never used smokeless tobacco. He reports that he does not currently use alcohol. He reports that he does not use drugs.    Family History:  No family history on file.     Allergies:  Patient has no known allergies.    Outpatient Medications:  Current Outpatient Medications   Medication Instructions    acetaminophen (TYLENOL) 1,000 mg, oral, Every 6 hours PRN    aspirin 81 mg, oral, Daily    atorvastatin (LIPITOR) 40 mg, oral, Daily    cilostazol (PLETAL) 50 mg, oral, 2 times daily    clopidogrel (PLAVIX) 75 mg, oral, Daily    glipiZIDE (GLUCOTROL) 5 mg, oral, 2 times daily    losartan (COZAAR) 50 mg, oral, Daily    oxyCODONE-acetaminophen (Percocet) 5-325 mg tablet 1 tablet, oral, Every 6 hours PRN    semaglutide (OZEMPIC) 1 mg, subcutaneous, Every 7 days, Mondays    traMADol (ULTRAM) 50 mg, oral, Every 6 hours PRN    white petrolatum (Aquaphor) 41 % ointment ointment 1 Application, Topical, Daily    Xarelto 2.5 mg, oral, 2 times daily       Physical Exam:  GEN      No acute distress, dry mucous membranes, in wheel chair  HENT     NCAT,   Eyes      PERRL. EOMI. Anicteric  NECK     Supple, No JVD, No thyromegaly  CHEST   No resp distress, normal diaphragmatic movement.  HEART   RRR, S1S2+, No M/R/G  ABD       Soft, NT, ND.  EXT       No clubbing, cyanosis or edema. bilateral legs wrapped in dressing   NEURO  Alert, oriented, and appropriately interactive. Moving all extremities  Skin       Warm and dry, No rashes       Last Labs:  CBC -  Lab Results   Component Value Date    WBC 8.4 05/16/2024    HGB 12.9 (L) 05/16/2024    HCT 40.0 (L) 05/16/2024    MCV 83 05/16/2024     05/16/2024       CMP -  Lab Results   Component Value Date    CALCIUM 8.7  "05/16/2024    PHOS 3.5 02/04/2024    PROT 7.2 01/31/2024    ALBUMIN 2.9 (L) 02/04/2024    AST 10 01/31/2024    ALT 6 (L) 01/31/2024    ALKPHOS 77 01/31/2024    BILITOT 0.3 01/31/2024       LIPID PANEL -   No results found for: \"CHOL\", \"TRIG\", \"HDL\", \"CHHDL\", \"LDLF\", \"VLDL\", \"NHDL\"    RENAL FUNCTION PANEL -   Lab Results   Component Value Date    GLUCOSE 168 (H) 05/16/2024     05/16/2024    K 4.3 05/16/2024     05/16/2024    CO2 24 05/16/2024    ANIONGAP 15 05/16/2024    BUN 21 05/16/2024    CREATININE 0.56 05/16/2024    CALCIUM 8.7 05/16/2024    PHOS 3.5 02/04/2024    ALBUMIN 2.9 (L) 02/04/2024        Lab Results   Component Value Date    HGBA1C 7.1 (H) 02/09/2024       Last Cardiology Tests:  Vascular Studies:  7.16.2024  PRELIMINARY CONCLUSIONS:  Right Lower PVR: Evidence of severe arterial occlusive disease in the right lower extremity at rest. Monophasic flow is noted in the right dorsalis pedis artery. Multiphasic flow is noted in the right common femoral artery. Posterior tibial artery is not audible.  Left Lower PVR: Evidence of mild arterial occlusive disease in the left lower extremity at rest. Multiphasic flow is noted in the left common femoral artery, left posterior tibial artery and left dorsalis pedis artery.     Imaging & Doppler Findings:     RIGHT Lower PVR              Pressures Ratios  Right Dorsalis Pedis (Ankle) 68 mmHg   0.41           LEFT Lower PVR                Pressures Ratios  Left Posterior Tibial (Ankle) 171 mmHg  1.04  Left Dorsalis Pedis (Ankle)   180 mmHg  1.09                              Right     Left  Brachial Pressure 165 mmHg 138 mmHg        4-11.2024  CONCLUSIONS:  Right Lower PVR: Evidence of severe arterial occlusive disease in the right lower extremity at rest. Decreased digital perfusion noted. Monophasic flow is noted in the right posterior tibial artery and right dorsalis pedis artery. Triphasic flow is noted in the right common femoral artery. Results called " by Doppler tracings and PVR waveforms due to paritially calcified vessels. Unable to obtain digit pressure due to abnormal tracings.  Left Lower PVR: Evidence of moderate arterial occlusive disease in the left lower extremity at rest. Monophasic flow is noted in the left posterior tibial artery and left dorsalis pedis artery. Biphasic flow is noted in the left common femoral artery. Results called by Doppler tracings and PVR waveforms due to paritially calcified vessels. Unable to obtain digit tracings due to ovelrying bandages.     Imaging & Doppler Findings:     RIGHT Lower PVR                Pressures Ratios  Right Posterior Tibial (Ankle) 78 mmHg   0.43  Right Dorsalis Pedis (Ankle)   74 mmHg   0.41           LEFT Lower PVR                Pressures Ratios  Left Posterior Tibial (Ankle) 158 mmHg  0.87  Left Dorsalis Pedis (Ankle)   155 mmHg  0.85                           Right     Left  Brachial Pressure 182 mmHg 171 mmHg           [id number] Juwan Perkins DO  Electronically signed by [id number] Juwan Perkins DO on 4/11/2024 at 1:31:44 PM        Assessment/Plan   54 y.o. male with PMH of morbid obesity, DM2, smoker, HTN, HLD, CAD s/p CABG, HF, PAD s/p left and right iliac stentings, chronic venous dermatitis, who originally presented in January with extensive bilateral leg wounds with low EDIN/TBI, concerning for RC VI (CLTI).  - follows with Dr. Cecilia Velasquez because of reduced travel time from his home to her medical office.  Left leg ulcers continue to heal with good progress and is s/p amputation of L 1st and 2nd toe for gangrene.  Pt is worried about Right LE disease.    We reviewed the results of his EDIN and had a detailed discussion with him and his accompanying family members with a shared decision making approach. While we were happy with the EDIN results of his LLE, we communicated to the patient and family members about our concerns in regards to the findings of R LE ABIs suggestive of severe disease. After elaborate  discussion, we agreed to continue to watch him closely and return for follow up in a month. If there is any deterioration in distal perfusion during this time period, we advised him to reach out to [facility] clinic/ED and  communicated our intention to treat his Right leg if such a situation arises.   At this time, the patient and his family members want to pursue aggressive medical management. Hence we recommended antiplatelet therapy, Blood pressure control, Cholesterol control. Patient is on Cilostazol 50 mg BID And Xarelto 2.5 mg BID.   We recommended dietary control (encouraged Mediterranean style diet) and regular moderate activity.       Jorge Boone DO

## 2024-07-19 ENCOUNTER — PHARMACY VISIT (OUTPATIENT)
Dept: PHARMACY | Facility: CLINIC | Age: 54
End: 2024-07-19
Payer: COMMERCIAL

## 2024-08-07 ENCOUNTER — PREP FOR PROCEDURE (OUTPATIENT)
Dept: CARDIOLOGY | Facility: HOSPITAL | Age: 54
End: 2024-08-07
Payer: COMMERCIAL

## 2024-08-07 DIAGNOSIS — Z01.818 PREOP TESTING: ICD-10-CM

## 2024-08-07 DIAGNOSIS — I70.221 CRITICAL LIMB ISCHEMIA OF RIGHT LOWER EXTREMITY (MULTI): Primary | ICD-10-CM

## 2024-08-07 RX ORDER — SODIUM CHLORIDE 9 MG/ML
100 INJECTION, SOLUTION INTRAVENOUS CONTINUOUS
OUTPATIENT
Start: 2024-08-07

## 2024-08-15 ENCOUNTER — LAB (OUTPATIENT)
Dept: LAB | Facility: LAB | Age: 54
End: 2024-08-15
Payer: COMMERCIAL

## 2024-08-15 DIAGNOSIS — Z01.818 PREOP TESTING: ICD-10-CM

## 2024-08-15 DIAGNOSIS — I70.221 CRITICAL LIMB ISCHEMIA OF RIGHT LOWER EXTREMITY (MULTI): ICD-10-CM

## 2024-08-15 LAB
ANION GAP SERPL CALC-SCNC: 13 MMOL/L (ref 10–20)
BUN SERPL-MCNC: 23 MG/DL (ref 6–23)
CALCIUM SERPL-MCNC: 8.9 MG/DL (ref 8.6–10.6)
CHLORIDE SERPL-SCNC: 104 MMOL/L (ref 98–107)
CO2 SERPL-SCNC: 26 MMOL/L (ref 21–32)
CREAT SERPL-MCNC: 0.59 MG/DL (ref 0.5–1.3)
EGFRCR SERPLBLD CKD-EPI 2021: >90 ML/MIN/1.73M*2
ERYTHROCYTE [DISTWIDTH] IN BLOOD BY AUTOMATED COUNT: 17.7 % (ref 11.5–14.5)
GLUCOSE SERPL-MCNC: 242 MG/DL (ref 74–99)
HCT VFR BLD AUTO: 37.9 % (ref 41–52)
HGB BLD-MCNC: 11.9 G/DL (ref 13.5–17.5)
MCH RBC QN AUTO: 25.9 PG (ref 26–34)
MCHC RBC AUTO-ENTMCNC: 31.4 G/DL (ref 32–36)
MCV RBC AUTO: 83 FL (ref 80–100)
NRBC BLD-RTO: 0 /100 WBCS (ref 0–0)
PLATELET # BLD AUTO: 268 X10*3/UL (ref 150–450)
POTASSIUM SERPL-SCNC: 4.5 MMOL/L (ref 3.5–5.3)
RBC # BLD AUTO: 4.59 X10*6/UL (ref 4.5–5.9)
SODIUM SERPL-SCNC: 138 MMOL/L (ref 136–145)
WBC # BLD AUTO: 8.5 X10*3/UL (ref 4.4–11.3)

## 2024-08-15 PROCEDURE — 85027 COMPLETE CBC AUTOMATED: CPT

## 2024-08-15 PROCEDURE — 80048 BASIC METABOLIC PNL TOTAL CA: CPT

## 2024-08-15 PROCEDURE — 36415 COLL VENOUS BLD VENIPUNCTURE: CPT

## 2024-08-21 ENCOUNTER — HOSPITAL ENCOUNTER (OUTPATIENT)
Facility: HOSPITAL | Age: 54
Discharge: HOME | End: 2024-08-21
Attending: INTERNAL MEDICINE | Admitting: INTERNAL MEDICINE
Payer: COMMERCIAL

## 2024-08-21 VITALS
TEMPERATURE: 97.6 F | BODY MASS INDEX: 40.59 KG/M2 | RESPIRATION RATE: 16 BRPM | OXYGEN SATURATION: 99 % | HEIGHT: 71 IN | SYSTOLIC BLOOD PRESSURE: 142 MMHG | DIASTOLIC BLOOD PRESSURE: 85 MMHG | HEART RATE: 92 BPM

## 2024-08-21 DIAGNOSIS — I70.233 ATHEROSCLEROSIS OF NATIVE ARTERIES OF RIGHT LEG WITH ULCERATION OF ANKLE (MULTI): ICD-10-CM

## 2024-08-21 DIAGNOSIS — I70.221 CRITICAL LIMB ISCHEMIA OF RIGHT LOWER EXTREMITY (MULTI): ICD-10-CM

## 2024-08-21 PROBLEM — I70.222 CRITICAL LIMB ISCHEMIA OF LEFT LOWER EXTREMITY (MULTI): Status: ACTIVE | Noted: 2024-08-21

## 2024-08-21 LAB — ACT BLD: 245 SEC (ref 83–199)

## 2024-08-21 PROCEDURE — 85347 COAGULATION TIME ACTIVATED: CPT

## 2024-08-21 PROCEDURE — 75716 ARTERY X-RAYS ARMS/LEGS: CPT | Performed by: INTERNAL MEDICINE

## 2024-08-21 PROCEDURE — 2720000007 HC OR 272 NO HCPCS: Performed by: INTERNAL MEDICINE

## 2024-08-21 PROCEDURE — 37224 HC REVASCULARIZE FEM/POP ARTERY,ANGIOPLASTY: CPT | Performed by: INTERNAL MEDICINE

## 2024-08-21 PROCEDURE — 99152 MOD SED SAME PHYS/QHP 5/>YRS: CPT | Performed by: INTERNAL MEDICINE

## 2024-08-21 PROCEDURE — 7100000011 HC EXTENDED STAY RECOVERY HOURLY - NURSING UNIT

## 2024-08-21 PROCEDURE — 2500000001 HC RX 250 WO HCPCS SELF ADMINISTERED DRUGS (ALT 637 FOR MEDICARE OP): Performed by: INTERNAL MEDICINE

## 2024-08-21 PROCEDURE — C1769 GUIDE WIRE: HCPCS | Performed by: INTERNAL MEDICINE

## 2024-08-21 PROCEDURE — 99153 MOD SED SAME PHYS/QHP EA: CPT | Performed by: INTERNAL MEDICINE

## 2024-08-21 PROCEDURE — C1894 INTRO/SHEATH, NON-LASER: HCPCS | Performed by: INTERNAL MEDICINE

## 2024-08-21 PROCEDURE — 2550000001 HC RX 255 CONTRASTS: Performed by: INTERNAL MEDICINE

## 2024-08-21 PROCEDURE — C2623 CATH, TRANSLUMIN, DRUG-COAT: HCPCS | Performed by: INTERNAL MEDICINE

## 2024-08-21 PROCEDURE — 96373 THER/PROPH/DIAG INJ IA: CPT | Performed by: INTERNAL MEDICINE

## 2024-08-21 PROCEDURE — 75716 ARTERY X-RAYS ARMS/LEGS: CPT | Mod: 59 | Performed by: INTERNAL MEDICINE

## 2024-08-21 PROCEDURE — 85347 COAGULATION TIME ACTIVATED: CPT | Performed by: INTERNAL MEDICINE

## 2024-08-21 PROCEDURE — C1887 CATHETER, GUIDING: HCPCS | Performed by: INTERNAL MEDICINE

## 2024-08-21 PROCEDURE — 2500000004 HC RX 250 GENERAL PHARMACY W/ HCPCS (ALT 636 FOR OP/ED): Performed by: INTERNAL MEDICINE

## 2024-08-21 PROCEDURE — 37224 PR REVSC OPN/PRG FEM/POP W/ANGIOPLASTY UNI: CPT | Performed by: INTERNAL MEDICINE

## 2024-08-21 RX ORDER — NAPROXEN SODIUM 220 MG/1
TABLET, FILM COATED ORAL AS NEEDED
Status: DISCONTINUED | OUTPATIENT
Start: 2024-08-21 | End: 2024-08-21 | Stop reason: HOSPADM

## 2024-08-21 RX ORDER — TRAMADOL HYDROCHLORIDE 50 MG/1
50 TABLET ORAL EVERY 6 HOURS PRN
Status: CANCELLED | OUTPATIENT
Start: 2024-08-21

## 2024-08-21 RX ORDER — ATORVASTATIN CALCIUM 40 MG/1
40 TABLET, FILM COATED ORAL DAILY
Status: CANCELLED | OUTPATIENT
Start: 2024-08-21

## 2024-08-21 RX ORDER — CLOPIDOGREL BISULFATE 75 MG/1
75 TABLET ORAL DAILY
Status: CANCELLED | OUTPATIENT
Start: 2024-08-22

## 2024-08-21 RX ORDER — LOSARTAN POTASSIUM 50 MG/1
50 TABLET ORAL DAILY
Status: CANCELLED | OUTPATIENT
Start: 2024-08-21

## 2024-08-21 RX ORDER — IODIXANOL 320 MG/ML
INJECTION, SOLUTION INTRAVASCULAR AS NEEDED
Status: DISCONTINUED | OUTPATIENT
Start: 2024-08-21 | End: 2024-08-21 | Stop reason: HOSPADM

## 2024-08-21 RX ORDER — GLIPIZIDE 5 MG/1
5 TABLET ORAL 2 TIMES DAILY
Status: CANCELLED | OUTPATIENT
Start: 2024-08-21

## 2024-08-21 RX ORDER — CILOSTAZOL 50 MG/1
50 TABLET ORAL 2 TIMES DAILY
Status: CANCELLED | OUTPATIENT
Start: 2024-08-21

## 2024-08-21 RX ORDER — ACETAMINOPHEN 325 MG/1
650 TABLET ORAL EVERY 6 HOURS PRN
Status: CANCELLED | OUTPATIENT
Start: 2024-08-21

## 2024-08-21 RX ORDER — PETROLATUM 420 MG/G
1 OINTMENT TOPICAL DAILY
Status: CANCELLED | OUTPATIENT
Start: 2024-08-21

## 2024-08-21 RX ORDER — FENTANYL CITRATE 50 UG/ML
INJECTION, SOLUTION INTRAMUSCULAR; INTRAVENOUS AS NEEDED
Status: DISCONTINUED | OUTPATIENT
Start: 2024-08-21 | End: 2024-08-21 | Stop reason: HOSPADM

## 2024-08-21 RX ORDER — NAPROXEN SODIUM 220 MG/1
325 TABLET, FILM COATED ORAL ONCE
Status: DISCONTINUED | OUTPATIENT
Start: 2024-08-21 | End: 2024-08-21

## 2024-08-21 RX ORDER — MIDAZOLAM HYDROCHLORIDE 1 MG/ML
INJECTION, SOLUTION INTRAMUSCULAR; INTRAVENOUS AS NEEDED
Status: DISCONTINUED | OUTPATIENT
Start: 2024-08-21 | End: 2024-08-21 | Stop reason: HOSPADM

## 2024-08-21 RX ORDER — CLOPIDOGREL BISULFATE 300 MG/1
TABLET, FILM COATED ORAL AS NEEDED
Status: DISCONTINUED | OUTPATIENT
Start: 2024-08-21 | End: 2024-08-21 | Stop reason: HOSPADM

## 2024-08-21 RX ORDER — HEPARIN SODIUM 1000 [USP'U]/ML
INJECTION, SOLUTION INTRAVENOUS; SUBCUTANEOUS AS NEEDED
Status: DISCONTINUED | OUTPATIENT
Start: 2024-08-21 | End: 2024-08-21 | Stop reason: HOSPADM

## 2024-08-21 RX ORDER — ASPIRIN 81 MG/1
81 TABLET ORAL DAILY
Status: CANCELLED | OUTPATIENT
Start: 2024-08-22

## 2024-08-21 RX ORDER — CLOPIDOGREL BISULFATE 300 MG/1
600 TABLET, FILM COATED ORAL ONCE
Status: DISCONTINUED | OUTPATIENT
Start: 2024-08-21 | End: 2024-08-21

## 2024-08-21 RX ORDER — OXYCODONE AND ACETAMINOPHEN 5; 325 MG/1; MG/1
1 TABLET ORAL EVERY 6 HOURS PRN
Status: CANCELLED | OUTPATIENT
Start: 2024-08-21

## 2024-08-21 RX ORDER — PROTAMINE SULFATE 10 MG/ML
INJECTION, SOLUTION INTRAVENOUS CONTINUOUS PRN
Status: COMPLETED | OUTPATIENT
Start: 2024-08-21 | End: 2024-08-21

## 2024-08-21 ASSESSMENT — PAIN - FUNCTIONAL ASSESSMENT: PAIN_FUNCTIONAL_ASSESSMENT: 0-10

## 2024-08-21 ASSESSMENT — PAIN SCALES - GENERAL: PAINLEVEL_OUTOF10: 0 - NO PAIN

## 2024-08-21 NOTE — Clinical Note
Vessel(s): right iliac artery, right CFA and right PFA. Injected with hand injections. Multiple views taken.

## 2024-08-21 NOTE — DISCHARGE INSTRUCTIONS
.        CARDIAC CATHETERIZATION DISCHARGE INSTRUCTIONS     FOR SUDDEN AND SEVERE CHEST PAIN, SHORTNESS OF BREATH, EXCESSIVE BLEEDING, SIGNS OF STROKE, OR CHANGES IN MENTAL STATUS YOU SHOULD CALL 911 IMMEDIATELY.     If your provider has prescribed aspirin and/or clopidogrel (Plavix), or prasugrel (Effient), or ticagrelor (Brilinta), DO NOT STOP THESE MEDICATIONS for any reason without talking to your cardiologist first. If any of these were prescribed, you must take them every day without missing a single dose. If you are getting low on these medications, contact your provider immediately for a refill.     FOR NEXT 24 HOURS  - Upon discharge, you should return home and rest for the remainder of the day and evening. You do not have to stay on bed rest but should not be very active.  It is recommended a responsible adult be with you for the first 24 hours after the procedure.    - No driving for 24 hours after procedure. Please arrange for someone to drive you home from the hospital today.     - Do not drive, operate machinery, or use power tools for 24 hours after your procedure.     - Do not make any legal decisions for 24 hours after your procedure.     - Do not drink alcoholic beverages for 24 hours after your procedure.    WOUND CARE   *FOR FEMORAL (LEG) ACCESS*  ·      Avoid heavy lifting (over 10 pounds) for 7 days, squatting or excessive bending for 2 days, and strenuous exercise for 7 days.  ·      No submerged bathing, swimming, or hot tubs for the next 7 days, or until fully healed.  ·      Avoid sexual activity for 3-4 days until any groin discomfort has ceased.     *FOR RADIAL (WRIST) ACCESS*  ·      No lifting more than 5 pounds or excessive use of the wrist for 24 hours - for example, treat your wrist as if it is sprained.  ·      Do not engage in vigorous activities (tennis, golf, bowling, weights) for at least 48 hours after the procedure.  ·      Do not submerge the wrist for 7 days after the  procedure.  ·      You should expect mild tingling in your hand and tenderness at the puncture site for up to 3 days.    - The transparent dressing should be removed from the site 24 hours after the procedure.  Wash the site gently with soap and water. Rinse well and pat dry. Keep the area clean and dry. You may apply a Band-Aid to the site. Avoid lotions, ointments, or powders until fully healed.     - You may shower the day after your procedure.      - It is normal to notice a small bruise around the puncture site and/or a small grape sized or smaller lump. Any large bruising or large lump warrants a call to the office.     - If bleeding should occur, lay down and apply pressure to the affected area for 10 minutes.  If the bleeding stops notify your physician.  If there is a large amount of bleeding or spurting of blood CALL 911 immediately.  DO NOT drive yourself to the hospital.    - You may experience some tenderness, bruising or minimal inflammation.  If you have any concerns, you may contact the Cath Lab or if any of these symptoms become excessive, contact your cardiologist or go to the emergency room.     OTHER INSTRUCTIONS  - You may take acetaminophen (Tylenol) as directed for discomfort.  If pain is not relieved with acetaminophen (Tylenol), contact your doctor.    - If you notice or experience any of the following, you should notify your doctor or seek medical attention  Chest pain or discomfort  Change in mental status or weakness in extremities.  Dizziness, light headedness, or feeling faint.  Change in the site where the procedure was performed, such as bleeding or an increased area of bruising or swelling.  Tingling, numbness, pain, or coolness in the leg/arm beyond the site where the procedure was performed.  Signs of infection (i.e. shaking chills, temperature > 100 degrees Fahrenheit, warmth, redness) in the leg/arm area where the procedure was performed.  Changes in urination   Bloody or black  stools  Vomiting blood  Severe nose bleeds  Any excessive bleeding    - If you DO NOT have an appointment with your cardiologist within 2-4 weeks following your procedure, please contact their office.

## 2024-08-21 NOTE — POST-PROCEDURE NOTE
Physician Transition of Care Summary  Invasive Cardiovascular Lab    Procedure Date: 8/21/2024  Attending:    * Jorge Boone - Primary  Resident/Fellow/Other Assistant: Surgeons and Role:     * Antwan Daugherty MD - Fellow     * Maurizio Bright MD - Fellow    Indications:   Pre-op Diagnosis      * Critical limb ischemia of right lower extremity (Multi) [I70.221]    Post-procedure diagnosis:   Post-op Diagnosis     * Critical limb ischemia of right lower extremity (Multi) [I70.221]    Procedure(s):     * Lower Extremity Angiogram  CHG ANGIOGRAPHY EXTREMITY UNILATERAL RS&I [22685]    Procedure Findings:   Severe disease of right profunda and SFA ()  S/P successful PTA with DCB of Right profunda    Access/Closure:  Right radial artery/TR band      Complications:   None    Stents/Implants:   Implants       No implant documentation for this case.            Anticoagulation/Antiplatelet Plan:   Aspirin/Plavix/Low dose Xarelto    Estimated Blood Loss:   10 mL    Anesthesia: Moderate Sedation Anesthesia Staff: No anesthesia staff entered.    Any Specimen(s) Removed:   No specimens collected during this procedure.    Disposition:   Home today      Electronically signed by: Antwan Daugherty MD, 8/21/2024 7:04 PM

## 2024-08-22 ENCOUNTER — TELEPHONE (OUTPATIENT)
Dept: CARDIOLOGY | Facility: HOSPITAL | Age: 54
End: 2024-08-22
Payer: COMMERCIAL

## 2024-08-22 DIAGNOSIS — I73.9 PAD (PERIPHERAL ARTERY DISEASE) (CMS-HCC): Primary | ICD-10-CM

## 2024-08-22 NOTE — PROGRESS NOTES
"     Pt arrived to floor, right radial band in place, syringe at bedside, no bleeding noted, fingers slightly cool, capillary refill less than 3 seconds, right radial pulse +2,  no numbness and tingling to right arm, pt sitting at the side of bed, refusing to lay down presently, pt table placed in front of him, to rest his right arm on, pt wanting to get dressed and place  leg sleeves on, pt with multiple vascular wounds that were unable to assess  pt leaving dressings on as he is being discharged as long as TR band is removed and no orders to redress, pt states \" I'm going home soon\", pt given meal, and urinal, will continue to monitor  "

## 2024-08-22 NOTE — PROGRESS NOTES
Pt was given all paperwork for discharge, discharge instructions, iv out, although did bleed, and had to be redressed with pressure dressing on top of right hand,  all belongings with pt, including cell phone, clothes, and post-op shoes, pt's radial band was removed after no bleeding with air removal at all intervals and none with final removal, placed gauze and transparent dressing

## 2024-08-22 NOTE — TELEPHONE ENCOUNTER
Patient was contacted the next day after his procedure with Dr. Boone-Lower Extremity Angiogram on 8/21/24. We discussed the importance of adhering to the medication regimen of Aspirin, Plavix and Eliquis that patient will have to continue taking. Also, patient was notified that he will follow up with dr Boone in 1 month with EDIN/TBI testing before his appointment. Right radial access site per patient is with no bleeding, patient reported that [facility] RN will be seeing him in the morning so he is keeping the band aid until then. Went over wrist access site instructions/restrictions. Per patient no concerns at this time, was advised to call the office with any questions or new symptoms.        Stephanie Julien, CNP  Endovascular/[facility]

## 2024-09-06 ENCOUNTER — PHARMACY VISIT (OUTPATIENT)
Dept: PHARMACY | Facility: CLINIC | Age: 54
End: 2024-09-06
Payer: COMMERCIAL

## 2024-09-06 PROCEDURE — RXMED WILLOW AMBULATORY MEDICATION CHARGE

## 2024-09-23 NOTE — PROGRESS NOTES
Chief Complaint: 1 month post procedure F/U with EDIN/TBI testing        History of Present Illness  Tim Stokes is a 54 y.o. year old male patient with h/o morbid obesity, DM2, smoker, HTN, HLD, CAD s/p CABG, HF, PAD s/p left and right iliac stentings, chronic venous dermatitis, who originally presented in January with extensive bilateral leg wounds with low EDIN/TBI, concerning for RC VI (CLTI).  - 2/9/24 s/p PTA/DCB of left CFA/SFA/popliteal, C/b distal embolization. S/p PTA and aspiration thrombectomy of Peroneal, PT(glass collateral) and AT(transcollateral) with Dr Boone.  Pt was following with Dr Marquez for wound care but now follows with Dr. Cecilia Velasquez because of reduced travel time from his home to her medical office.  Left leg ulcers continue to heal with good progress and is s/p amputation of L 1st and 2nd toe for gangrene.  07/11/2024 EDIN Rt: 0.41 results showing severe disease of the RLE (from 0.43), and improved results on the left leg 1.09 from 0.87 on 01/08/24.   On 8/21/24 patient underwent a peripheral Angiogram of RLE S/P successful PTA with DCB of Right profunda. Was discharged home the same day.  Today patient presented to the clinic 1 month post procedure follow up with vascular testing.       He overall feels well. He has cut back on smoking but has not quit entirely. He says that his right leg wound is healing. His right 2nd toe still has an eschar superficially. He has some concerns since his EDIN on the right today is 0.38 from 0.41       Past Medical History  Past Medical History:   Diagnosis Date    Acute decompensated heart failure (Multi)     Atherosclerosis of both lower extremities with bilateral ulceration (Multi)     Chronic ulcer of left calf with fat layer exposed (Multi)     Chronic ulcer of left foot with necrosis of muscle (Multi)     Chronic ulcer of right calf with fat layer exposed (Multi)     Critical limb ischemia of both lower extremities (Multi)     Diabetic  "vasculopathy (Multi)     Emphysema lung (Multi)     Gangrene of toe of left foot (Multi)     Hyperlipidemia     Hypertension     Osteomyelitis (Multi)     PAD (peripheral artery disease) (CMS-[facility])     Peripheral neuropathy     Severe sepsis (Multi)     Type 2 diabetes mellitus (Multi)     Venous ulcer of left leg (Multi)     Vision loss     Wound infection        Past Surgical History  Past Surgical History:   Procedure Laterality Date    CORONARY ARTERY BYPASS GRAFT      INVASIVE VASCULAR PROCEDURE N/A 02/09/2024    Procedure: Lower Extremity Angiogram;  Surgeon: Jorge Boone DO;  Location: [facility] Cardiac Cath Lab;  Service: Cardiovascular;  Laterality: N/A;  CPT CODE 07067    INVASIVE VASCULAR PROCEDURE Right 8/21/2024    Procedure: Lower Extremity Angiogram;  Surgeon: Jorge Boone DO;  Location: [facility] 2F Cardiac Cath Lab;  Service: Cardiovascular;  Laterality: Right;    INVASIVE VASCULAR PROCEDURE N/A 8/21/2024    Procedure: Lower Extremity Intervention;  Surgeon: Jorge Boone DO;  Location: [facility] 2F Cardiac Cath Lab;  Service: Cardiovascular;  Laterality: N/A;    TOE AMPUTATION         Social History  Social History     Tobacco Use    Smoking status: Every Day     Types: Cigarettes     Passive exposure: Current    Smokeless tobacco: Never    Tobacco comments:     Patient says he is trying to quit. Does not quantify how much he is smoking but says that it is just a few \"puffs here and there\"   Vaping Use    Vaping status: Not on file   Substance Use Topics    Alcohol use: Not Currently    Drug use: Never       Family History   No family history on file.    Review of Systems  As per HPI, all other systems reviewed and negative.    Allergies:  No Known Allergies     Outpatient Medications:  Current Outpatient Medications   Medication Instructions    acetaminophen (TYLENOL) 1,000 mg, oral, Every 6 hours PRN    aspirin 81 mg, oral, Daily    atorvastatin (LIPITOR) 40 mg, oral, Daily "    cilostazol (PLETAL) 50 mg, oral, 2 times daily    clopidogrel (PLAVIX) 75 mg, oral, Daily    glipiZIDE (GLUCOTROL) 5 mg, oral, 2 times daily    losartan (COZAAR) 50 mg, oral, Daily    oxyCODONE-acetaminophen (Percocet) 5-325 mg tablet 1 tablet, oral, Every 6 hours PRN    semaglutide (OZEMPIC) 1 mg, subcutaneous, Every 7 days, Mondays    traMADol (ULTRAM) 50 mg, oral, Every 6 hours PRN    white petrolatum (Aquaphor) 41 % ointment ointment 1 Application, Topical, Daily    Xarelto 2.5 mg, oral, 2 times daily         Vitals:  There were no vitals filed for this visit.    Physical Exam:  Physical Exam   General: No acute distress  HEENT: PERRL, EOMI, atraumatic  Ext: RLE with chronic wounds at anterior and posterior calf, right toe with superficial eschar; patient declines examination of LLE that is wrapped currently     Reviewed Study(s):    He had an EDIN today that was 0.38 on the right compared to 0.41 on 7/11    Cardiac Studies  Echo: No results found for this or any previous visit.  Angiogram:     Vascular Studies   Invasive vascular procedure     [facility], Cath Lab, [address] [place] [address]    Cardiovascular Catheterization Report    Patient Name:      KRYSTYNA LIRA         Performing Physician:  [id number] Jorgejamie Boone DO  Study Date:        8/21/2024            [facility] Phys[facility]   [id number]   MRN/PID:           [id number]             Cardiologist/Co-scrub:  Accession#:        [id number]         Ordering Physician:    [id number] HAIM SAMUEL  Date of Birth/Age: 1970 / 54 years Fellow:                [id number] Maurizio Bright MD  Gender:            M                    Fellow:                [id number] Antwan                                                                  Willow DELGADO  Encounter#:        [id number]       Study:            Peripheral Angiogram  Additional Study: Peripheral Intervention       Procedure Description:  After infiltration with 2% Lidocaine utilizing two-dimensional ultrasound and fluoroscopic guidance, the right radial artery was cannulated using a modified Seldinger technique. Subsequently a 6 [place] sheath was placed antegrade in the right radial artery. After completion of the procedure, TR Band compression device was placed per protocol.     Right Lower Extremity Arterial Findings:    Right Common Iliac Artery:  The right common iliac artery revealed mild atherosclerotic disease.  Right Extermal Iliac Artery:  The right external iliac artery revealed a previous patent stent.  Right Common Femoral Artery:  The right common femoral artery revealed no evidence of significant disease and diffuse mild atherosclerotic disease.  Right Profunda Femoris Artery:  The right profunda femoris artery revealed severe ostial to proximal atherosclerotic disease.  Right Superior Femoral Artery:  The right superficial femoral artery revealed severe proximal atherosclerotic disease.     Peripheral Interventions:  After obtaining diagnostic images. Heparin was given to maintain ACt >300.  We proceeded with wiring the profunda using navicross 0.035 and command 0.014. We then proceeded with In.Pact DCB 7.0X40mm inflated at 14ATM for 3 minutes. Wire out shot revealed great vessel runoff. Patient tolerated the procedure very well.       Percutaneous peripheral intervention of the proximal right profunda femoris artery. The vessel was dilated using a IN.PACT DCB 7.0 mm x 40 mm balloon dilated to 14 SHEMAR.     Hemo Personnel:  +--------------------+---------+  Name                Duty       +--------------------+---------+  Jorge Boone MD 1  +--------------------+---------+       Hemodynamic Pressures:      +----+--------------------+----------+-------------+--------------+---------+  Site     Date Time      Phase NameSystolic mmHgDiastolic mmHgMean mmHg  +----+--------------------+----------+-------------+--------------+---------+    AO8/21/2024 6:48:54 PM      Rest          206            94      135  +----+--------------------+----------+-------------+--------------+---------+    AO8/21/2024 6:48:57 PM      Rest          204            91      136  +----+--------------------+----------+-------------+--------------+---------+    AO8/21/2024 6:54:47 PM      Rest          216           100      144  +----+--------------------+----------+-------------+--------------+---------+       Complications:  No in-lab complications observed.     Cardiac Cath Post Procedure Notes:  Post Procedure Diagnosis: S/p Right lower extremity aniogram with PTA to                            Profunda.  Blood Loss:               Estimated blood loss during the procedure was 10cc                            mls.  Specimens Removed:        Number of specimen(s) removed: none.    ____________________________________________________________________________________  CONCLUSIONS:   1. Indication: Right lower extremity CLTI, RC V.   2. S/p Right lower extremity angiogram revealed severe proximal Profunda stenosis, OCT Ostial .   3. S/p In.Pact DCB to proximal Profunda. 7.0X40mm.   4. DAPT, low Dose Xarelto and statin per prior recommendations.   5. Agressive risk factor modification.    ICD 10 Codes:  Atherosclerosis of native arteries of right leg with ulceration of ankle-I70.233     CPT Codes:  Revasc Fem/Popl Angioplasty unilat(PER)-90558     [id number] Jorge Boone DO  Performing Physician  Electronically signed by [id number] Jorge Boone DO on 9/1/2024 at 5:31:03 PM         ** Final **       Assessment/Plan   54 y.o. male with PMH of morbid obesity, DM2, smoker, HTN, HLD, CAD s/p CABG, HF, PAD s/p left and right  iliac stentings, chronic venous dermatitis, who originally presented in January with extensive bilateral leg wounds with low EDIN/TBI, concerning for RC VI (CLTI).  - follows with Dr. Cecilia Velasquez because of reduced travel time from his home to her medical office.  Left leg ulcers continue to heal with good progress and is s/p amputation of L 1st and 2nd toe for gangrene.  Patient underwent 8/21/24 patient underwent a peripheral Angiogram of RLE S/P successful PTA with DCB of Right profunda.   Today patient presented to the clinic 1 month post procedure follow up with vascular testing.     9/24/24: His wounds are healing. His EDIN is about the same. We emphasized his need to quit smoking and make healthy lifestyle changes. We had a long discussion with the patient, his brother and his sister-in-law and came to a consensus that risk factor modification and continued surveillance is the agreed upon approach at this time.     Plan:  Smoking cessation  [facility] clinic referral   Return in 3 months with EDIN    Jorge Boone DO

## 2024-09-24 ENCOUNTER — APPOINTMENT (OUTPATIENT)
Dept: CARDIOLOGY | Facility: CLINIC | Age: 54
End: 2024-09-24
Payer: COMMERCIAL

## 2024-09-24 ENCOUNTER — HOSPITAL ENCOUNTER (OUTPATIENT)
Dept: VASCULAR MEDICINE | Facility: HOSPITAL | Age: 54
Discharge: HOME | End: 2024-09-24
Payer: COMMERCIAL

## 2024-09-24 DIAGNOSIS — I73.9 PAD (PERIPHERAL ARTERY DISEASE) (CMS-HCC): ICD-10-CM

## 2024-09-24 DIAGNOSIS — I73.9 PVD (PERIPHERAL VASCULAR DISEASE) (CMS-HCC): Primary | ICD-10-CM

## 2024-09-24 PROCEDURE — 3051F HG A1C>EQUAL 7.0%<8.0%: CPT | Performed by: INTERNAL MEDICINE

## 2024-09-24 PROCEDURE — 4010F ACE/ARB THERAPY RXD/TAKEN: CPT | Performed by: INTERNAL MEDICINE

## 2024-09-24 PROCEDURE — 99214 OFFICE O/P EST MOD 30 MIN: CPT | Performed by: INTERNAL MEDICINE

## 2024-09-24 PROCEDURE — 93922 UPR/L XTREMITY ART 2 LEVELS: CPT

## 2024-09-24 PROCEDURE — 93922 UPR/L XTREMITY ART 2 LEVELS: CPT | Performed by: INTERNAL MEDICINE

## 2024-09-24 PROCEDURE — 4004F PT TOBACCO SCREEN RCVD TLK: CPT | Performed by: INTERNAL MEDICINE

## 2024-09-24 NOTE — PATIENT INSTRUCTIONS
The most important thing for your continued recovery is that you quit smoking.     Continue to take your medications as prescribed.  Continue to elevate and wrap your legs.    We will refer you to our [facility] clinic for risk factor modification and healthy lifestyle changes.

## 2024-10-09 PROCEDURE — RXMED WILLOW AMBULATORY MEDICATION CHARGE

## 2024-10-11 ENCOUNTER — PHARMACY VISIT (OUTPATIENT)
Dept: PHARMACY | Facility: CLINIC | Age: 54
End: 2024-10-11
Payer: COMMERCIAL

## 2024-10-22 ENCOUNTER — APPOINTMENT (OUTPATIENT)
Dept: CARDIOLOGY | Facility: CLINIC | Age: 54
End: 2024-10-22
Payer: COMMERCIAL

## 2024-10-22 ENCOUNTER — APPOINTMENT (OUTPATIENT)
Dept: VASCULAR MEDICINE | Facility: HOSPITAL | Age: 54
End: 2024-10-22
Payer: COMMERCIAL

## 2024-11-19 ENCOUNTER — PHARMACY VISIT (OUTPATIENT)
Dept: PHARMACY | Facility: CLINIC | Age: 54
End: 2024-11-19
Payer: COMMERCIAL

## 2024-11-19 PROCEDURE — RXMED WILLOW AMBULATORY MEDICATION CHARGE

## 2025-02-25 DIAGNOSIS — E11.51 DM (DIABETES MELLITUS) TYPE II, CONTROLLED, WITH PERIPHERAL VASCULAR DISORDER: Primary | ICD-10-CM

## 2025-03-03 ENCOUNTER — TELEPHONE (OUTPATIENT)
Dept: CARDIOLOGY | Facility: HOSPITAL | Age: 55
End: 2025-03-03
Payer: COMMERCIAL

## 2025-03-03 NOTE — TELEPHONE ENCOUNTER
LEONEL: MNT follow up note    Called patient for 2 week follow up. She continues to exercise daily with her wrestle training, following plant based diet plan and avoiding sweets most of the time. She has noticed that on weekends she eats richer meals that may have more fat and sugar, but doesn't feel she is binge eating or depriving her self. She is frustrated in that her weight loss seems to have plateau at 185 pounds.   Diet adherence: good. Pt's weekend splurges may be contributing to inability to loose weight  Exercise goals met: yes  BP monitoring: na  SMBG: les, pt is wearing [employer] CGM - avg glucose is 100  Medication adherence: na    Education/plan:  Encouraged pt to continue diet and exercise plan, and to not focus entirely on that number on the scale.   2. We will talk again on phone in 3 weeks, if pt continues to have difficulty with weight loss we will do a more detailed calorie analysis.      Ronna Cardona RD, [facility]

## 2025-04-03 DIAGNOSIS — I73.9 PAD (PERIPHERAL ARTERY DISEASE) (CMS-HCC): ICD-10-CM

## 2025-04-03 DIAGNOSIS — I70.223 CRITICAL LIMB ISCHEMIA OF BOTH LOWER EXTREMITIES: ICD-10-CM

## 2025-04-06 PROCEDURE — RXMED WILLOW AMBULATORY MEDICATION CHARGE

## 2025-04-06 RX ORDER — CLOPIDOGREL BISULFATE 75 MG/1
75 TABLET ORAL DAILY
Qty: 30 TABLET | Refills: 11 | Status: SHIPPED | OUTPATIENT
Start: 2025-04-06 | End: 2026-04-06

## 2025-04-06 RX ORDER — RIVAROXABAN 2.5 MG/1
2.5 TABLET, FILM COATED ORAL 2 TIMES DAILY
Qty: 60 TABLET | Refills: 11 | Status: SHIPPED | OUTPATIENT
Start: 2025-04-06 | End: 2026-04-06

## 2025-04-07 PROCEDURE — RXMED WILLOW AMBULATORY MEDICATION CHARGE

## 2025-04-09 ENCOUNTER — PHARMACY VISIT (OUTPATIENT)
Dept: PHARMACY | Facility: CLINIC | Age: 55
End: 2025-04-09
Payer: COMMERCIAL

## 2025-06-16 ENCOUNTER — APPOINTMENT (OUTPATIENT)
Dept: RADIOLOGY | Facility: HOSPITAL | Age: 55
DRG: 066 | End: 2025-06-16
Payer: COMMERCIAL

## 2025-06-16 ENCOUNTER — HOSPITAL ENCOUNTER (INPATIENT)
Facility: HOSPITAL | Age: 55
LOS: 1 days | Discharge: HOME | DRG: 066 | End: 2025-06-17
Attending: EMERGENCY MEDICINE | Admitting: FAMILY MEDICINE
Payer: COMMERCIAL

## 2025-06-16 ENCOUNTER — APPOINTMENT (OUTPATIENT)
Dept: CARDIOLOGY | Facility: HOSPITAL | Age: 55
DRG: 066 | End: 2025-06-16
Payer: COMMERCIAL

## 2025-06-16 DIAGNOSIS — I63.9 CEREBROVASCULAR ACCIDENT (CVA), UNSPECIFIED MECHANISM (MULTI): Primary | ICD-10-CM

## 2025-06-16 DIAGNOSIS — Z01.89 ENCOUNTER FOR OTHER SPECIFIED SPECIAL EXAMINATIONS: ICD-10-CM

## 2025-06-16 LAB
ALBUMIN SERPL BCP-MCNC: 3.7 G/DL (ref 3.4–5)
ALP SERPL-CCNC: 82 U/L (ref 33–120)
ALT SERPL W P-5'-P-CCNC: 6 U/L (ref 10–52)
ANION GAP SERPL CALC-SCNC: 15 MMOL/L (ref 10–20)
AST SERPL W P-5'-P-CCNC: 10 U/L (ref 9–39)
BASOPHILS # BLD AUTO: 0.06 X10*3/UL (ref 0–0.1)
BASOPHILS NFR BLD AUTO: 0.7 %
BILIRUB SERPL-MCNC: 0.3 MG/DL (ref 0–1.2)
BNP SERPL-MCNC: 92 PG/ML (ref 0–99)
BUN SERPL-MCNC: 19 MG/DL (ref 6–23)
CALCIUM SERPL-MCNC: 9.1 MG/DL (ref 8.6–10.3)
CARDIAC TROPONIN I PNL SERPL HS: 11 NG/L (ref 0–20)
CHLORIDE SERPL-SCNC: 106 MMOL/L (ref 98–107)
CHOLEST SERPL-MCNC: 178 MG/DL (ref 0–199)
CHOLESTEROL/HDL RATIO: 4.5
CO2 SERPL-SCNC: 21 MMOL/L (ref 21–32)
CREAT SERPL-MCNC: 0.68 MG/DL (ref 0.5–1.3)
EGFRCR SERPLBLD CKD-EPI 2021: >90 ML/MIN/1.73M*2
EOSINOPHIL # BLD AUTO: 0.2 X10*3/UL (ref 0–0.7)
EOSINOPHIL NFR BLD AUTO: 2.4 %
ERYTHROCYTE [DISTWIDTH] IN BLOOD BY AUTOMATED COUNT: 14.5 % (ref 11.5–14.5)
GLUCOSE BLD MANUAL STRIP-MCNC: 127 MG/DL (ref 74–99)
GLUCOSE BLD MANUAL STRIP-MCNC: 158 MG/DL (ref 74–99)
GLUCOSE SERPL-MCNC: 131 MG/DL (ref 74–99)
HCT VFR BLD AUTO: 49.3 % (ref 41–52)
HDLC SERPL-MCNC: 39.5 MG/DL
HGB BLD-MCNC: 15 G/DL (ref 13.5–17.5)
IMM GRANULOCYTES # BLD AUTO: 0.03 X10*3/UL (ref 0–0.7)
IMM GRANULOCYTES NFR BLD AUTO: 0.4 % (ref 0–0.9)
INR PPP: 1.1 (ref 0.9–1.1)
LDLC SERPL CALC-MCNC: 114 MG/DL
LYMPHOCYTES # BLD AUTO: 2.32 X10*3/UL (ref 1.2–4.8)
LYMPHOCYTES NFR BLD AUTO: 28.1 %
MAGNESIUM SERPL-MCNC: 1.77 MG/DL (ref 1.6–2.4)
MCH RBC QN AUTO: 27.9 PG (ref 26–34)
MCHC RBC AUTO-ENTMCNC: 30.4 G/DL (ref 32–36)
MCV RBC AUTO: 92 FL (ref 80–100)
MONOCYTES # BLD AUTO: 0.6 X10*3/UL (ref 0.1–1)
MONOCYTES NFR BLD AUTO: 7.3 %
NEUTROPHILS # BLD AUTO: 5.04 X10*3/UL (ref 1.2–7.7)
NEUTROPHILS NFR BLD AUTO: 61.1 %
NON HDL CHOLESTEROL: 139 MG/DL (ref 0–149)
NRBC BLD-RTO: 0 /100 WBCS (ref 0–0)
PLATELET # BLD AUTO: 219 X10*3/UL (ref 150–450)
POTASSIUM SERPL-SCNC: 4.1 MMOL/L (ref 3.5–5.3)
PROT SERPL-MCNC: 7.3 G/DL (ref 6.4–8.2)
PROTHROMBIN TIME: 12.6 SECONDS (ref 9.8–12.4)
RBC # BLD AUTO: 5.37 X10*6/UL (ref 4.5–5.9)
SODIUM SERPL-SCNC: 138 MMOL/L (ref 136–145)
TRIGL SERPL-MCNC: 124 MG/DL (ref 0–149)
VLDL: 25 MG/DL (ref 0–40)
WBC # BLD AUTO: 8.3 X10*3/UL (ref 4.4–11.3)

## 2025-06-16 PROCEDURE — 70450 CT HEAD/BRAIN W/O DYE: CPT

## 2025-06-16 PROCEDURE — 70551 MRI BRAIN STEM W/O DYE: CPT | Performed by: RADIOLOGY

## 2025-06-16 PROCEDURE — 80053 COMPREHEN METABOLIC PANEL: CPT | Performed by: EMERGENCY MEDICINE

## 2025-06-16 PROCEDURE — 1200000002 HC GENERAL ROOM WITH TELEMETRY DAILY

## 2025-06-16 PROCEDURE — 99285 EMERGENCY DEPT VISIT HI MDM: CPT | Mod: 25 | Performed by: EMERGENCY MEDICINE

## 2025-06-16 PROCEDURE — 85610 PROTHROMBIN TIME: CPT | Performed by: EMERGENCY MEDICINE

## 2025-06-16 PROCEDURE — 82947 ASSAY GLUCOSE BLOOD QUANT: CPT

## 2025-06-16 PROCEDURE — 85025 COMPLETE CBC W/AUTO DIFF WBC: CPT | Performed by: EMERGENCY MEDICINE

## 2025-06-16 PROCEDURE — 36415 COLL VENOUS BLD VENIPUNCTURE: CPT | Performed by: EMERGENCY MEDICINE

## 2025-06-16 PROCEDURE — 2500000001 HC RX 250 WO HCPCS SELF ADMINISTERED DRUGS (ALT 637 FOR MEDICARE OP): Performed by: EMERGENCY MEDICINE

## 2025-06-16 PROCEDURE — 70544 MR ANGIOGRAPHY HEAD W/O DYE: CPT

## 2025-06-16 PROCEDURE — 70547 MR ANGIOGRAPHY NECK W/O DYE: CPT | Performed by: RADIOLOGY

## 2025-06-16 PROCEDURE — 93005 ELECTROCARDIOGRAM TRACING: CPT

## 2025-06-16 PROCEDURE — 2500000001 HC RX 250 WO HCPCS SELF ADMINISTERED DRUGS (ALT 637 FOR MEDICARE OP): Performed by: FAMILY MEDICINE

## 2025-06-16 PROCEDURE — 83880 ASSAY OF NATRIURETIC PEPTIDE: CPT | Performed by: FAMILY MEDICINE

## 2025-06-16 PROCEDURE — 70547 MR ANGIOGRAPHY NECK W/O DYE: CPT

## 2025-06-16 PROCEDURE — 83036 HEMOGLOBIN GLYCOSYLATED A1C: CPT | Mod: AHULAB | Performed by: FAMILY MEDICINE

## 2025-06-16 PROCEDURE — 70551 MRI BRAIN STEM W/O DYE: CPT

## 2025-06-16 PROCEDURE — 80061 LIPID PANEL: CPT | Performed by: FAMILY MEDICINE

## 2025-06-16 PROCEDURE — 70544 MR ANGIOGRAPHY HEAD W/O DYE: CPT | Performed by: RADIOLOGY

## 2025-06-16 PROCEDURE — 83735 ASSAY OF MAGNESIUM: CPT | Performed by: EMERGENCY MEDICINE

## 2025-06-16 PROCEDURE — 84484 ASSAY OF TROPONIN QUANT: CPT | Performed by: EMERGENCY MEDICINE

## 2025-06-16 PROCEDURE — 70450 CT HEAD/BRAIN W/O DYE: CPT | Performed by: RADIOLOGY

## 2025-06-16 RX ORDER — RIVAROXABAN 2.5 MG/1
2.5 TABLET, FILM COATED ORAL 2 TIMES DAILY
Status: DISCONTINUED | OUTPATIENT
Start: 2025-06-16 | End: 2025-06-17 | Stop reason: HOSPADM

## 2025-06-16 RX ORDER — LOSARTAN POTASSIUM 50 MG/1
50 TABLET ORAL DAILY
Status: DISCONTINUED | OUTPATIENT
Start: 2025-06-17 | End: 2025-06-17 | Stop reason: HOSPADM

## 2025-06-16 RX ORDER — CLOPIDOGREL BISULFATE 75 MG/1
75 TABLET ORAL DAILY
Status: DISCONTINUED | OUTPATIENT
Start: 2025-06-17 | End: 2025-06-17 | Stop reason: HOSPADM

## 2025-06-16 RX ORDER — NAPROXEN SODIUM 220 MG/1
TABLET, FILM COATED ORAL
Status: DISPENSED
Start: 2025-06-16 | End: 2025-06-17

## 2025-06-16 RX ORDER — SENNOSIDES 8.6 MG/1
2 TABLET ORAL 2 TIMES DAILY
Status: DISCONTINUED | OUTPATIENT
Start: 2025-06-16 | End: 2025-06-17 | Stop reason: HOSPADM

## 2025-06-16 RX ORDER — NAPROXEN SODIUM 220 MG/1
81 TABLET, FILM COATED ORAL DAILY
Status: DISCONTINUED | OUTPATIENT
Start: 2025-06-17 | End: 2025-06-17 | Stop reason: HOSPADM

## 2025-06-16 RX ORDER — ASPIRIN 81 MG/1
81 TABLET ORAL DAILY
Status: DISCONTINUED | OUTPATIENT
Start: 2025-06-17 | End: 2025-06-16 | Stop reason: SDUPTHER

## 2025-06-16 RX ORDER — INSULIN LISPRO 100 [IU]/ML
0-10 INJECTION, SOLUTION INTRAVENOUS; SUBCUTANEOUS
Status: DISCONTINUED | OUTPATIENT
Start: 2025-06-17 | End: 2025-06-17 | Stop reason: HOSPADM

## 2025-06-16 RX ORDER — ACETAMINOPHEN 650 MG/1
650 SUPPOSITORY RECTAL EVERY 4 HOURS PRN
Status: DISCONTINUED | OUTPATIENT
Start: 2025-06-16 | End: 2025-06-17 | Stop reason: HOSPADM

## 2025-06-16 RX ORDER — ACETAMINOPHEN 325 MG/1
650 TABLET ORAL EVERY 4 HOURS PRN
Status: DISCONTINUED | OUTPATIENT
Start: 2025-06-16 | End: 2025-06-17 | Stop reason: HOSPADM

## 2025-06-16 RX ORDER — DEXTROSE 50 % IN WATER (D50W) INTRAVENOUS SYRINGE
25
Status: DISCONTINUED | OUTPATIENT
Start: 2025-06-16 | End: 2025-06-17 | Stop reason: HOSPADM

## 2025-06-16 RX ORDER — OXYCODONE AND ACETAMINOPHEN 5; 325 MG/1; MG/1
1 TABLET ORAL EVERY 6 HOURS PRN
Status: DISCONTINUED | OUTPATIENT
Start: 2025-06-16 | End: 2025-06-17 | Stop reason: HOSPADM

## 2025-06-16 RX ORDER — ACETAMINOPHEN 160 MG/5ML
650 SOLUTION ORAL EVERY 4 HOURS PRN
Status: DISCONTINUED | OUTPATIENT
Start: 2025-06-16 | End: 2025-06-17 | Stop reason: HOSPADM

## 2025-06-16 RX ORDER — ASPIRIN 300 MG/1
300 SUPPOSITORY RECTAL DAILY
Status: DISCONTINUED | OUTPATIENT
Start: 2025-06-17 | End: 2025-06-17 | Stop reason: HOSPADM

## 2025-06-16 RX ORDER — INSULIN GLARGINE 100 [IU]/ML
INJECTION, SOLUTION SUBCUTANEOUS
COMMUNITY
Start: 2025-04-18

## 2025-06-16 RX ORDER — INSULIN LISPRO 100 [IU]/ML
INJECTION, SOLUTION INTRAVENOUS; SUBCUTANEOUS
COMMUNITY
Start: 2025-05-31

## 2025-06-16 RX ORDER — NAPROXEN SODIUM 220 MG/1
81 TABLET, FILM COATED ORAL ONCE
Status: COMPLETED | OUTPATIENT
Start: 2025-06-16 | End: 2025-06-16

## 2025-06-16 RX ORDER — ASPIRIN 81 MG/1
81 TABLET ORAL DAILY
Status: DISCONTINUED | OUTPATIENT
Start: 2025-06-17 | End: 2025-06-17 | Stop reason: HOSPADM

## 2025-06-16 RX ORDER — LABETALOL HYDROCHLORIDE 5 MG/ML
10 INJECTION, SOLUTION INTRAVENOUS EVERY 10 MIN PRN
Status: DISCONTINUED | OUTPATIENT
Start: 2025-06-16 | End: 2025-06-16 | Stop reason: RX

## 2025-06-16 RX ORDER — DEXTROSE 50 % IN WATER (D50W) INTRAVENOUS SYRINGE
12.5
Status: DISCONTINUED | OUTPATIENT
Start: 2025-06-16 | End: 2025-06-17 | Stop reason: HOSPADM

## 2025-06-16 RX ORDER — GLIPIZIDE 5 MG/1
5 TABLET ORAL 2 TIMES DAILY
Status: DISCONTINUED | OUTPATIENT
Start: 2025-06-16 | End: 2025-06-17 | Stop reason: HOSPADM

## 2025-06-16 RX ORDER — ATORVASTATIN CALCIUM 40 MG/1
40 TABLET, FILM COATED ORAL DAILY
Status: DISCONTINUED | OUTPATIENT
Start: 2025-06-17 | End: 2025-06-17 | Stop reason: HOSPADM

## 2025-06-16 RX ADMIN — RIVAROXABAN 2.5 MG: 2.5 TABLET, FILM COATED ORAL at 22:10

## 2025-06-16 RX ADMIN — ASPIRIN 81 MG: 81 TABLET, CHEWABLE ORAL at 16:04

## 2025-06-16 SDOH — SOCIAL STABILITY: SOCIAL INSECURITY: ARE YOU OR HAVE YOU BEEN THREATENED OR ABUSED PHYSICALLY, EMOTIONALLY, OR SEXUALLY BY ANYONE?: NO

## 2025-06-16 SDOH — ECONOMIC STABILITY: TRANSPORTATION INSECURITY: IN THE PAST 12 MONTHS, HAS LACK OF TRANSPORTATION KEPT YOU FROM MEDICAL APPOINTMENTS OR FROM GETTING MEDICATIONS?: NO

## 2025-06-16 SDOH — ECONOMIC STABILITY: HOUSING INSECURITY: IN THE LAST 12 MONTHS, WAS THERE A TIME WHEN YOU WERE NOT ABLE TO PAY THE MORTGAGE OR RENT ON TIME?: NO

## 2025-06-16 SDOH — SOCIAL STABILITY: SOCIAL INSECURITY: ARE THERE ANY APPARENT SIGNS OF INJURIES/BEHAVIORS THAT COULD BE RELATED TO ABUSE/NEGLECT?: NO

## 2025-06-16 SDOH — ECONOMIC STABILITY: FOOD INSECURITY: WITHIN THE PAST 12 MONTHS, THE FOOD YOU BOUGHT JUST DIDN'T LAST AND YOU DIDN'T HAVE MONEY TO GET MORE.: NEVER TRUE

## 2025-06-16 SDOH — SOCIAL STABILITY: SOCIAL INSECURITY: HAVE YOU HAD ANY THOUGHTS OF HARMING ANYONE ELSE?: NO

## 2025-06-16 SDOH — SOCIAL STABILITY: SOCIAL INSECURITY: WITHIN THE LAST YEAR, HAVE YOU BEEN HUMILIATED OR EMOTIONALLY ABUSED IN OTHER WAYS BY YOUR PARTNER OR EX-PARTNER?: NO

## 2025-06-16 SDOH — HEALTH STABILITY: PHYSICAL HEALTH: ON AVERAGE, HOW MANY MINUTES DO YOU ENGAGE IN EXERCISE AT THIS LEVEL?: 0 MIN

## 2025-06-16 SDOH — ECONOMIC STABILITY: HOUSING INSECURITY: AT ANY TIME IN THE PAST 12 MONTHS, WERE YOU HOMELESS OR LIVING IN A SHELTER (INCLUDING NOW)?: NO

## 2025-06-16 SDOH — SOCIAL STABILITY: SOCIAL INSECURITY: WITHIN THE LAST YEAR, HAVE YOU BEEN AFRAID OF YOUR PARTNER OR EX-PARTNER?: NO

## 2025-06-16 SDOH — HEALTH STABILITY: PHYSICAL HEALTH: ON AVERAGE, HOW MANY DAYS PER WEEK DO YOU ENGAGE IN MODERATE TO STRENUOUS EXERCISE (LIKE A BRISK WALK)?: 0 DAYS

## 2025-06-16 SDOH — ECONOMIC STABILITY: INCOME INSECURITY: IN THE PAST 12 MONTHS HAS THE ELECTRIC, GAS, OIL, OR WATER COMPANY THREATENED TO SHUT OFF SERVICES IN YOUR HOME?: NO

## 2025-06-16 SDOH — ECONOMIC STABILITY: FOOD INSECURITY: WITHIN THE PAST 12 MONTHS, YOU WORRIED THAT YOUR FOOD WOULD RUN OUT BEFORE YOU GOT THE MONEY TO BUY MORE.: NEVER TRUE

## 2025-06-16 SDOH — SOCIAL STABILITY: SOCIAL INSECURITY
WITHIN THE LAST YEAR, HAVE YOU BEEN RAPED OR FORCED TO HAVE ANY KIND OF SEXUAL ACTIVITY BY YOUR PARTNER OR EX-PARTNER?: NO

## 2025-06-16 SDOH — SOCIAL STABILITY: SOCIAL INSECURITY: DOES ANYONE TRY TO KEEP YOU FROM HAVING/CONTACTING OTHER FRIENDS OR DOING THINGS OUTSIDE YOUR HOME?: NO

## 2025-06-16 SDOH — ECONOMIC STABILITY: FOOD INSECURITY: HOW HARD IS IT FOR YOU TO PAY FOR THE VERY BASICS LIKE FOOD, HOUSING, MEDICAL CARE, AND HEATING?: NOT HARD AT ALL

## 2025-06-16 SDOH — SOCIAL STABILITY: SOCIAL INSECURITY
WITHIN THE LAST YEAR, HAVE YOU BEEN KICKED, HIT, SLAPPED, OR OTHERWISE PHYSICALLY HURT BY YOUR PARTNER OR EX-PARTNER?: NO

## 2025-06-16 SDOH — SOCIAL STABILITY: SOCIAL INSECURITY: HAS ANYONE EVER THREATENED TO HURT YOUR FAMILY OR YOUR PETS?: NO

## 2025-06-16 SDOH — ECONOMIC STABILITY: HOUSING INSECURITY: IN THE PAST 12 MONTHS, HOW MANY TIMES HAVE YOU MOVED WHERE YOU WERE LIVING?: 0

## 2025-06-16 SDOH — SOCIAL STABILITY: SOCIAL INSECURITY: HAVE YOU HAD THOUGHTS OF HARMING ANYONE ELSE?: NO

## 2025-06-16 SDOH — SOCIAL STABILITY: SOCIAL INSECURITY: DO YOU FEEL ANYONE HAS EXPLOITED OR TAKEN ADVANTAGE OF YOU FINANCIALLY OR OF YOUR PERSONAL PROPERTY?: NO

## 2025-06-16 SDOH — SOCIAL STABILITY: SOCIAL INSECURITY: DO YOU FEEL UNSAFE GOING BACK TO THE PLACE WHERE YOU ARE LIVING?: NO

## 2025-06-16 SDOH — SOCIAL STABILITY: SOCIAL INSECURITY: WERE YOU ABLE TO COMPLETE ALL THE BEHAVIORAL HEALTH SCREENINGS?: YES

## 2025-06-16 SDOH — SOCIAL STABILITY: SOCIAL INSECURITY: ABUSE: ADULT

## 2025-06-16 ASSESSMENT — COGNITIVE AND FUNCTIONAL STATUS - GENERAL
STANDING UP FROM CHAIR USING ARMS: A LITTLE
MOVING TO AND FROM BED TO CHAIR: A LITTLE
CLIMB 3 TO 5 STEPS WITH RAILING: A LOT
WALKING IN HOSPITAL ROOM: A LOT
PATIENT BASELINE BEDBOUND: NO
MOBILITY SCORE: 18
DAILY ACTIVITIY SCORE: 22
HELP NEEDED FOR BATHING: A LITTLE
PERSONAL GROOMING: A LITTLE

## 2025-06-16 ASSESSMENT — PAIN - FUNCTIONAL ASSESSMENT
PAIN_FUNCTIONAL_ASSESSMENT: 0-10
PAIN_FUNCTIONAL_ASSESSMENT: 0-10

## 2025-06-16 ASSESSMENT — PAIN SCALES - GENERAL
PAINLEVEL_OUTOF10: 0 - NO PAIN
PAINLEVEL_OUTOF10: 0 - NO PAIN

## 2025-06-16 ASSESSMENT — ACTIVITIES OF DAILY LIVING (ADL)
WALKS IN HOME: NEEDS ASSISTANCE
ADEQUATE_TO_COMPLETE_ADL: YES
HEARING - RIGHT EAR: FUNCTIONAL
LACK_OF_TRANSPORTATION: NO
TOILETING: NEEDS ASSISTANCE
FEEDING YOURSELF: INDEPENDENT
GROOMING: INDEPENDENT
DRESSING YOURSELF: INDEPENDENT
JUDGMENT_ADEQUATE_SAFELY_COMPLETE_DAILY_ACTIVITIES: YES
LACK_OF_TRANSPORTATION: NO
LACK_OF_TRANSPORTATION: NO
PATIENT'S MEMORY ADEQUATE TO SAFELY COMPLETE DAILY ACTIVITIES?: YES
BATHING: NEEDS ASSISTANCE
HEARING - LEFT EAR: FUNCTIONAL

## 2025-06-16 ASSESSMENT — LIFESTYLE VARIABLES
PRESCIPTION_ABUSE_PAST_12_MONTHS: NO
SUBSTANCE_ABUSE_PAST_12_MONTHS: NO
AUDIT-C TOTAL SCORE: 0
HOW OFTEN DO YOU HAVE 6 OR MORE DRINKS ON ONE OCCASION: NEVER
HOW OFTEN DO YOU HAVE A DRINK CONTAINING ALCOHOL: NEVER
SKIP TO QUESTIONS 9-10: 1
AUDIT-C TOTAL SCORE: 0
HOW MANY STANDARD DRINKS CONTAINING ALCOHOL DO YOU HAVE ON A TYPICAL DAY: PATIENT DOES NOT DRINK

## 2025-06-16 ASSESSMENT — PATIENT HEALTH QUESTIONNAIRE - PHQ9
2. FEELING DOWN, DEPRESSED OR HOPELESS: NOT AT ALL
1. LITTLE INTEREST OR PLEASURE IN DOING THINGS: NOT AT ALL
SUM OF ALL RESPONSES TO PHQ9 QUESTIONS 1 & 2: 0

## 2025-06-16 NOTE — PROGRESS NOTES
Transitional Care Coordination Progress Note:  Plan per Medical/Surgical team: treatment of facial droop and slurred speech with neuro work up  Status: ED  Payor source: medical mutual   Discharge disposition: Home alone with [facility]  Potential Barriers: left foot wound- chronic, non compliance with meds  ADOD: 6/18/2025   JAMIE Leong RN, BSN Transitional Care Coordinator ED# [phone]      06/16/25 1410   Discharge Planning   Living Arrangements Alone   Support Systems Family members   Assistance Needed neuro work up   Type of Residence Private residence   Number of Stairs to Enter Residence 0   Number of Stairs Within Residence 0   Do you have animals or pets at home? Yes   Type of Animals or Pets dog   Home or Post Acute Services In home services   Type of Home Care Services Home nursing visits;Home OT;Home PT   Expected Discharge Disposition Home Health  (Home alone with [facility])   Does the patient need discharge transport arranged? No   Financial Resource Strain   How hard is it for you to pay for the very basics like food, housing, medical care, and heating? Not very   Housing Stability   In the last 12 months, was there a time when you were not able to pay the mortgage or rent on time? N   In the past 12 months, how many times have you moved where you were living? 1   At any time in the past 12 months, were you homeless or living in a shelter (including now)? N   Transportation Needs   In the past 12 months, has lack of transportation kept you from medical appointments or from getting medications? no   In the past 12 months, has lack of transportation kept you from meetings, work, or from getting things needed for daily living? No   Patient Choice   Provider Choice list and CMS website (https://medicare.gov/care-compare#search) for post-acute Quality and Resource Measure Data were provided and reviewed with: Patient   Patient / Family choosing to utilize agency / facility established prior to  hospitalization Yes   Stroke Family Assessment   Stroke Family Assessment Needed No   Intensity of Service   Intensity of Service 0-30 min

## 2025-06-16 NOTE — ED PROVIDER NOTES
History/Exam limitations: none.   Additional history was obtained from patient and past medical records.          HPI:    Tim Stokes is a 55 y.o. male PMH hypertension, CAD, diabetes, peripheral vascular disease presenting for evaluation of left facial weakness and slurred speech times approximately 3 days.  Patient states that he felt normal when he got up on Saturday morning.  He states that at some point in the day Saturday he began to experience facial droop and slurred speech.  He states that his brother noticed it.  He came today because his brother asked the wound care nurse that visited the home about it and they advised he comes in to be seen.  He denies any headache.  He states that he is been feeling generally weak.  No chest pain shortness breath or palpitations.  No nausea or vomiting.  No vision changes.  No abdominal pain diarrhea or dysuria.          Physical Exam:  ED Triage Vitals   Temperature Heart Rate Respirations BP   06/16/25 1213 06/16/25 1135 06/16/25 1135 06/16/25 1135   37.1 °C (98.7 °F) 74 16 (!) 181/60      Pulse Ox Temp src Heart Rate Source Patient Position   06/16/25 1135 -- -- --   96 %         BP Location FiO2 (%)     -- --              GEN:      Alert, NAD  Eyes:       PERRL, EOMI  HENT:      NC/AT, OP clear, airway patent, MM  CV:      RRR, no MRG, no LE pitting edema, 2+ radial and pedal pulses  PULM:     CTAB, no w/r/r, easy WOB, symmetric chest rise  ABD:      Soft, NT, ND, no masses, BS +  :       No CVA TTP  NEURO:   A/Ox4, mild left lower facial weakness, otherwise CN II-XII intact, subtle drift off the bed to left upper extremity, no drift in right upper extremity, no drift in bilateral lower extremities, no dysarthria, no aphasia, NIHSS is [place]  MSK:      FROM, no joint deformities or swelling, no e/o trauma  SKIN:       Warm and dry  PSYCH:    Appropriate mood and affect    Interval: Baseline  Time: 2:34 PM  Person Administering Scale: Senthil Morfin MD     1a   Level of consciousness: 0=alert; keenly responsive   1b. LOC questions:  0=Performs both tasks correctly   1c. LOC commands: 0=Performs both tasks correctly   2.  Best Gaze: 0=normal   3. Visual: 0=No visual loss   4. Facial Palsy: 1=Minor paralysis (flattened nasolabial fold, asymmetric on smiling)   5a. Motor left arm: 1=Drift, limb holds 90 (or 45) degrees but drifts down before full 10 seconds: does not hit bed   5b.  Motor right arm: 0=No drift, limb holds 90 (or 45) degrees for full 10 seconds   6a. motor left le=No drift, limb holds 90 (or 45) degrees for full 10 seconds   6b  Motor right le=No drift, limb holds 90 (or 45) degrees for full 10 seconds   7. Limb Ataxia: 0=Absent   8.  Sensory: 0=Normal; no sensory loss   9. Best Language:  0=No aphasia, normal   10. Dysarthria: 1=Mild to moderate, patient slurs at least some words and at worst, can be understood with some difficulty   11. Extinction and Inattention: 0=No abnormality     Total:   3         VAN: VAN: Negative       MDM/ED Course:   Tim Stokes is a 55 y.o. male PMH hypertension, CAD, diabetes, peripheral vascular disease presenting for evaluation of left facial weakness and slurred speech times approximately 3 days.  Vitals remarkable for hypertension.  Exam as documented above.  Stroke alert was deferred given time of last known well.  Patient is not a TNK or MT candidate given time of last known well.  Differential includes subacute CVA, intracranial hemorrhage, complex migraine, focal seizure.  IV placed and labs drawn.  CT head obtained and no evidence of acute intracranial hemorrhage.  Patient took full dose aspirin yesterday, baby aspirin given here in the ED.  Glucose reassuring.  Chemistry and CBC reassuring.  Troponin negative.  Given medical picture, exam, workup patient would benefit from hospitalization for MRI and further management.  Patient was accepted by admitting provider for continued management in stable  condition.    EKG reviewed by me: 11:39 AM normal sinus rhythm, rate 68, normal axis, first-degree AV block with OH interval 320 ms, normal QRS and QTc intervals, no STEMI, similar to prior EKGs.     ED Course as of 06/17/25 1434   Mon Jun 16, 2025   1329 Reports he takes full assessment intermittently, took a dose yesterday, has not taken any aspirin today. [JM]      ED Course User Index  [JM] Senthil Morfin MD         Diagnoses as of 06/17/25 1434   Cerebrovascular accident (CVA), unspecified mechanism (Multi)         Chronic medical conditions affecting care listed in MDM. I independently reviewed imaging studies and agreed with radiology reads. I reviewed recent and relevant outside records including PCP notes, Prior discharge summaries, and prior radiology reports.    Procedure  Procedures    Diagnosis:   1.  Subacute CVA    Dispo: Hospitalized      Disclaimer: Portions of this note were dictated by speech recognition. An attempt at proof reading was made to minimize errors. Minor errors in transcription may be present.  Please call if questions.     Senthil Morfin MD  06/17/25 1434

## 2025-06-16 NOTE — PROGRESS NOTES
06/16/25 1407   [facility] Disability Status   Are you deaf or do you have serious difficulty hearing? N   Are you blind or do you have serious difficulty seeing, even when wearing glasses? N   Because of a physical, mental, or emotional condition, do you have serious difficulty concentrating, remembering, or making decisions? (5 years old or older) Y  (facial droop and slurred speech X 4 days)   Do you have serious difficulty walking or climbing stairs? N   Do you have serious difficulty dressing or bathing? N   Because of a physical, mental, or emotional condition, do you have serious difficulty doing errands alone such as visiting the doctor? N

## 2025-06-16 NOTE — ED TRIAGE NOTES
PT is A/ox4 coming  in for facial droop and slurred speech that has been going on for about 4 days. PT states that he feels fine. PT has not been taking his blood pressure medications or asprin for at least a month. PT took his insulin about did not eat this morning BGL is 158 @time of triage. PT stated that he doesn't notice the slurred speech as much as he did before and the droop is no longer there.

## 2025-06-16 NOTE — PROGRESS NOTES
06/16/25 1407   [facility] Disability Status   Are you deaf or do you have serious difficulty hearing? N   Are you blind or do you have serious difficulty seeing, even when wearing glasses? N   Because of a physical, mental, or emotional condition, do you have serious difficulty concentrating, remembering, or making decisions? (5 years old or older) Y  (facial droop and slurred speech X 4 days)   Do you have serious difficulty walking or climbing stairs? Y  (left foot wound, uses walker)   Do you have serious difficulty dressing or bathing? Y   Because of a physical, mental, or emotional condition, do you have serious difficulty doing errands alone such as visiting the doctor? Y

## 2025-06-16 NOTE — PROGRESS NOTES
Pharmacy Medication History     Source of Information: PATIENT    Additional concerns with the patient's PTA list. PATIENT STATES HE HASN'T TAKEN ANYTHING BUT INSULIN (LANTUS, HUMALOG) IN THE LAST FEW MONTHS DUE TO NOT HAVING REFILLS.    Notified Provider via Haiku : MD ZOIE BACA ON HAIKU    The following updates were made to the Prior to Admission medication list:     Medications ADDED:   LANTUS  HUMALOG  Medications CHANGED:  N/A  Medications REMOVED:   N/A  Medications NOT TAKING:   N/A    Allergy reviewed : Yes    Meds 2 Beds : Yes    Outpatient pharmacy confirmed and updated in chart : Yes    [facility]: [facility]    The list below reflectives the updated PTA list. Please review each medication in order reconciliation for additional clarification and justification.    Prior to Admission Medications   Prescriptions Last Dose      Lantus Solostar U-100 Insulin 100 unit/mL (3 mL) pen Past Week      Sig: [The details of the medication are not available because there are pending changes by a home health clinician.]   acetaminophen (Tylenol) 500 mg tablet Past Month      Sig: Take 2 tablets (1,000 mg) by mouth every 6 hours if needed for mild pain (1 - 3).   aspirin 81 mg EC tablet More than a month      Sig: Take 1 tablet (81 mg) by mouth once daily.   atorvastatin (Lipitor) 40 mg tablet More than a month      Sig: Take 1 tablet (40 mg) by mouth once daily.   clopidogrel (Plavix) 75 mg tablet More than a month      Sig: Take 1 tablet (75 mg) by mouth once daily.   glipiZIDE (Glucotrol) 5 mg tablet More than a month      Sig: Take 1 tablet (5 mg) by mouth 2 times a day.   insulin lispro (HumaLOG) 100 unit/mL pen Past Week      losartan (Cozaar) 50 mg tablet More than a month      Sig: Take 1 tablet (50 mg) by mouth once daily.   oxyCODONE-acetaminophen (Percocet) 5-325 mg tablet More than a month      Sig: Take 1 tablet by mouth every 6 hours if needed for severe pain (7 - 10).   rivaroxaban (Xarelto) 2.5 mg tablet  More than a month      Sig: Take 1 tablet (2.5 mg) by mouth 2 times a day.   semaglutide (OZEMPIC) 1 mg/dose (4 mg/3 mL) pen injector More than a month      Sig: Inject 1 mg under the skin every 7 days. Mondays   traMADol (Ultram) 50 mg tablet More than a month      Sig: Take 1 tablet (50 mg) by mouth every 6 hours if needed for severe pain (7 - 10).   white petrolatum (Aquaphor) 41 % ointment ointment More than a month      Sig: Apply 1 Application topically once daily.      Facility-Administered Medications: None       The list below reflectives the updated allergy list. Please review each documented allergy for additional clarification and justification.    No Known Allergies       06/16/25 at 1:54 PM - Rosangela Florentino

## 2025-06-17 ENCOUNTER — APPOINTMENT (OUTPATIENT)
Dept: CARDIOLOGY | Facility: HOSPITAL | Age: 55
DRG: 066 | End: 2025-06-17
Payer: COMMERCIAL

## 2025-06-17 ENCOUNTER — DOCUMENTATION (OUTPATIENT)
Dept: INPATIENT UNIT | Facility: HOSPITAL | Age: 55
End: 2025-06-17

## 2025-06-17 VITALS
BODY MASS INDEX: 39.01 KG/M2 | HEIGHT: 72 IN | HEART RATE: 55 BPM | RESPIRATION RATE: 18 BRPM | TEMPERATURE: 97.7 F | WEIGHT: 288 LBS | DIASTOLIC BLOOD PRESSURE: 72 MMHG | OXYGEN SATURATION: 97 % | SYSTOLIC BLOOD PRESSURE: 171 MMHG

## 2025-06-17 PROBLEM — I63.81 LACUNAR INFARCT, ACUTE (MULTI): Status: ACTIVE | Noted: 2025-06-16

## 2025-06-17 PROBLEM — E11.49 TYPE II OR UNSPECIFIED TYPE DIABETES MELLITUS WITH NEUROLOGICAL MANIFESTATIONS, UNCONTROLLED(250.62) (MULTI): Status: ACTIVE | Noted: 2023-02-13

## 2025-06-17 LAB
ANION GAP SERPL CALC-SCNC: 14 MMOL/L (ref 10–20)
AORTIC VALVE MEAN GRADIENT: 2 MMHG
AORTIC VALVE PEAK VELOCITY: 0.9 M/S
ATRIAL RATE: 68 BPM
AV PEAK GRADIENT: 3 MMHG
AVA (PEAK VEL): 5.29 CM2
AVA (VTI): 5.96 CM2
BASOPHILS # BLD AUTO: 0.07 X10*3/UL (ref 0–0.1)
BASOPHILS NFR BLD AUTO: 0.9 %
BODY SURFACE AREA: 2.58 M2
BUN SERPL-MCNC: 17 MG/DL (ref 6–23)
CALCIUM SERPL-MCNC: 8.9 MG/DL (ref 8.6–10.3)
CHLORIDE SERPL-SCNC: 108 MMOL/L (ref 98–107)
CO2 SERPL-SCNC: 20 MMOL/L (ref 21–32)
CREAT SERPL-MCNC: 0.68 MG/DL (ref 0.5–1.3)
EGFRCR SERPLBLD CKD-EPI 2021: >90 ML/MIN/1.73M*2
EJECTION FRACTION APICAL 4 CHAMBER: 70.2
EJECTION FRACTION: 63 %
EOSINOPHIL # BLD AUTO: 0.17 X10*3/UL (ref 0–0.7)
EOSINOPHIL NFR BLD AUTO: 2.3 %
ERYTHROCYTE [DISTWIDTH] IN BLOOD BY AUTOMATED COUNT: 14.6 % (ref 11.5–14.5)
EST. AVERAGE GLUCOSE BLD GHB EST-MCNC: 171 MG/DL
GLUCOSE BLD MANUAL STRIP-MCNC: 122 MG/DL (ref 74–99)
GLUCOSE BLD MANUAL STRIP-MCNC: 210 MG/DL (ref 74–99)
GLUCOSE SERPL-MCNC: 106 MG/DL (ref 74–99)
HBA1C MFR BLD: 7.6 % (ref ?–5.7)
HCT VFR BLD AUTO: 44 % (ref 41–52)
HGB BLD-MCNC: 14 G/DL (ref 13.5–17.5)
IMM GRANULOCYTES # BLD AUTO: 0.02 X10*3/UL (ref 0–0.7)
IMM GRANULOCYTES NFR BLD AUTO: 0.3 % (ref 0–0.9)
LEFT ATRIUM VOLUME AREA LENGTH INDEX BSA: 26.6 ML/M2
LEFT VENTRICLE INTERNAL DIMENSION DIASTOLE: 5.05 CM (ref 3.5–6)
LEFT VENTRICULAR OUTFLOW TRACT DIAMETER: 2.66 CM
LYMPHOCYTES # BLD AUTO: 2.3 X10*3/UL (ref 1.2–4.8)
LYMPHOCYTES NFR BLD AUTO: 31.1 %
MCH RBC QN AUTO: 28.1 PG (ref 26–34)
MCHC RBC AUTO-ENTMCNC: 31.8 G/DL (ref 32–36)
MCV RBC AUTO: 88 FL (ref 80–100)
MITRAL VALVE E/A RATIO: 0.76
MONOCYTES # BLD AUTO: 0.63 X10*3/UL (ref 0.1–1)
MONOCYTES NFR BLD AUTO: 8.5 %
NEUTROPHILS # BLD AUTO: 4.2 X10*3/UL (ref 1.2–7.7)
NEUTROPHILS NFR BLD AUTO: 56.9 %
NRBC BLD-RTO: 0 /100 WBCS (ref 0–0)
P AXIS: 51 DEGREES
PLATELET # BLD AUTO: 221 X10*3/UL (ref 150–450)
POTASSIUM SERPL-SCNC: 4.1 MMOL/L (ref 3.5–5.3)
PR INTERVAL: 328 MS
Q ONSET: 216 MS
QRS COUNT: 11 BEATS
QRS DURATION: 98 MS
QT INTERVAL: 414 MS
QTC CALCULATION(BAZETT): 440 MS
QTC FREDERICIA: 431 MS
R AXIS: 43 DEGREES
RBC # BLD AUTO: 4.98 X10*6/UL (ref 4.5–5.9)
RIGHT VENTRICLE FREE WALL PEAK S': 11 CM/S
SODIUM SERPL-SCNC: 138 MMOL/L (ref 136–145)
T AXIS: 111 DEGREES
T OFFSET: 423 MS
TRICUSPID ANNULAR PLANE SYSTOLIC EXCURSION: 1.9 CM
VENTRICULAR RATE: 68 BPM
WBC # BLD AUTO: 7.4 X10*3/UL (ref 4.4–11.3)

## 2025-06-17 PROCEDURE — 36415 COLL VENOUS BLD VENIPUNCTURE: CPT | Performed by: FAMILY MEDICINE

## 2025-06-17 PROCEDURE — 99223 1ST HOSP IP/OBS HIGH 75: CPT | Performed by: PSYCHIATRY & NEUROLOGY

## 2025-06-17 PROCEDURE — 2500000002 HC RX 250 W HCPCS SELF ADMINISTERED DRUGS (ALT 637 FOR MEDICARE OP, ALT 636 FOR OP/ED): Performed by: FAMILY MEDICINE

## 2025-06-17 PROCEDURE — 2500000004 HC RX 250 GENERAL PHARMACY W/ HCPCS (ALT 636 FOR OP/ED)

## 2025-06-17 PROCEDURE — C8929 TTE W OR WO FOL WCON,DOPPLER: HCPCS

## 2025-06-17 PROCEDURE — 2500000001 HC RX 250 WO HCPCS SELF ADMINISTERED DRUGS (ALT 637 FOR MEDICARE OP): Performed by: FAMILY MEDICINE

## 2025-06-17 PROCEDURE — 80048 BASIC METABOLIC PNL TOTAL CA: CPT | Performed by: FAMILY MEDICINE

## 2025-06-17 PROCEDURE — 93306 TTE W/DOPPLER COMPLETE: CPT | Performed by: INTERNAL MEDICINE

## 2025-06-17 PROCEDURE — 85025 COMPLETE CBC W/AUTO DIFF WBC: CPT | Performed by: FAMILY MEDICINE

## 2025-06-17 PROCEDURE — 82947 ASSAY GLUCOSE BLOOD QUANT: CPT

## 2025-06-17 RX ORDER — INSULIN GLARGINE 100 [IU]/ML
30 INJECTION, SOLUTION SUBCUTANEOUS 2 TIMES DAILY
Status: DISCONTINUED | OUTPATIENT
Start: 2025-06-17 | End: 2025-06-17 | Stop reason: HOSPADM

## 2025-06-17 RX ADMIN — RIVAROXABAN 2.5 MG: 2.5 TABLET, FILM COATED ORAL at 09:45

## 2025-06-17 RX ADMIN — CLOPIDOGREL 75 MG: 75 TABLET ORAL at 09:45

## 2025-06-17 RX ADMIN — ASPIRIN 81 MG: 81 TABLET, COATED ORAL at 09:45

## 2025-06-17 RX ADMIN — INSULIN LISPRO 4 UNITS: 100 INJECTION, SOLUTION INTRAVENOUS; SUBCUTANEOUS at 13:20

## 2025-06-17 RX ADMIN — INSULIN GLARGINE 30 UNITS: 100 INJECTION, SOLUTION SUBCUTANEOUS at 09:45

## 2025-06-17 RX ADMIN — HUMAN ALBUMIN MICROSPHERES AND PERFLUTREN 5 ML: 10; .22 INJECTION, SOLUTION INTRAVENOUS at 15:59

## 2025-06-17 RX ADMIN — GLIPIZIDE 5 MG: 5 TABLET ORAL at 09:45

## 2025-06-17 RX ADMIN — ATORVASTATIN CALCIUM 40 MG: 40 TABLET, FILM COATED ORAL at 09:45

## 2025-06-17 RX ADMIN — LOSARTAN POTASSIUM 50 MG: 50 TABLET, FILM COATED ORAL at 09:45

## 2025-06-17 ASSESSMENT — COGNITIVE AND FUNCTIONAL STATUS - GENERAL
CLIMB 3 TO 5 STEPS WITH RAILING: A LOT
MOVING TO AND FROM BED TO CHAIR: A LITTLE
MOBILITY SCORE: 18
PERSONAL GROOMING: A LITTLE
DAILY ACTIVITIY SCORE: 22
HELP NEEDED FOR BATHING: A LITTLE
STANDING UP FROM CHAIR USING ARMS: A LITTLE
WALKING IN HOSPITAL ROOM: A LOT

## 2025-06-17 ASSESSMENT — PAIN - FUNCTIONAL ASSESSMENT: PAIN_FUNCTIONAL_ASSESSMENT: 0-10

## 2025-06-17 ASSESSMENT — PAIN SCALES - GENERAL: PAINLEVEL_OUTOF10: 0 - NO PAIN

## 2025-06-17 NOTE — NURSING NOTE
1600 Pt. Left AMA and refused to sign AMA forms, Pt. Stated he expected to be discharged after completing Echo. Pt. Stated he was unable to wait for the team to review his echo results. Pt. And his brother was educated by Unit Manager that by leaving, there is a chance that his insurance will not cover his stay. Pt. still left, stating he can be called with results and he didn't want to miss his ride.

## 2025-06-17 NOTE — NURSING NOTE
"  2300: Informed Lexy LING-CNP about the pt's heart rate going down into the 40's and the pt's high blood pressures. Will continue to monitor heart and she stated, \"allow for permissive htn in the first 24-48hrs with stroke patients for perfusion.\" I will continue to monitor vital signs and any pt symptoms.     - Kirstin Mariscal RN   " No

## 2025-06-17 NOTE — PROGRESS NOTES
Tim Stokes is a 55 y.o. male on day 1 of admission presenting with Lacunar infarct, acute (Multi).      Subjective   ***       Objective     Last Recorded Vitals  /72 (BP Location: Left arm, Patient Position: Lying)   Pulse 55   Temp 36.5 °C (97.7 °F) (Temporal)   Resp 18   Wt 131 kg (288 lb)   SpO2 97%   Intake/Output last 3 Shifts:    Intake/Output Summary (Last 24 hours) at 6/17/2025 1343  Last data filed at 6/17/2025 1151  Gross per 24 hour   Intake 240 ml   Output 450 ml   Net -210 ml       Admission Weight  Weight: 138 kg (304 lb 3.8 oz) (06/16/25 1135)    Daily Weight  06/16/25 : 131 kg (288 lb)    Image Results  Electrocardiogram, 12-lead PRN ACS symptoms  Sinus rhythm with 1st degree AV block  Nonspecific T wave abnormality  Abnormal ECG  When compared with ECG of 09-FEB-2024 16:12,  Nonspecific T wave abnormality now evident in Lateral leads    Medications Ordered Prior to Encounter[1]    Results for orders placed or performed during the hospital encounter of 06/16/25 (from the past 24 hours)   POCT GLUCOSE   Result Value Ref Range    POCT Glucose 127 (H) 74 - 99 mg/dL   B-Type Natriuretic Peptide   Result Value Ref Range    BNP 92 0 - 99 pg/mL   Basic Metabolic Panel   Result Value Ref Range    Glucose 106 (H) 74 - 99 mg/dL    Sodium 138 136 - 145 mmol/L    Potassium 4.1 3.5 - 5.3 mmol/L    Chloride 108 (H) 98 - 107 mmol/L    Bicarbonate 20 (L) 21 - 32 mmol/L    Anion Gap 14 10 - 20 mmol/L    Urea Nitrogen 17 6 - 23 mg/dL    Creatinine 0.68 0.50 - 1.30 mg/dL    eGFR >90 >60 mL/min/1.73m*2    Calcium 8.9 8.6 - 10.3 mg/dL   CBC and Auto Differential   Result Value Ref Range    WBC 7.4 4.4 - 11.3 x10*3/uL    nRBC 0.0 0.0 - 0.0 /100 WBCs    RBC 4.98 4.50 - 5.90 x10*6/uL    Hemoglobin 14.0 13.5 - 17.5 g/dL    Hematocrit 44.0 41.0 - 52.0 %    MCV 88 80 - 100 fL    MCH 28.1 26.0 - 34.0 pg    MCHC 31.8 (L) 32.0 - 36.0 g/dL    RDW 14.6 (H) 11.5 - 14.5 %    Platelets 221 150 - 450 x10*3/uL     Neutrophils % 56.9 40.0 - 80.0 %    Immature Granulocytes %, Automated 0.3 0.0 - 0.9 %    Lymphocytes % 31.1 13.0 - 44.0 %    Monocytes % 8.5 2.0 - 10.0 %    Eosinophils % 2.3 0.0 - 6.0 %    Basophils % 0.9 0.0 - 2.0 %    Neutrophils Absolute 4.20 1.20 - 7.70 x10*3/uL    Immature Granulocytes Absolute, Automated 0.02 0.00 - 0.70 x10*3/uL    Lymphocytes Absolute 2.30 1.20 - 4.80 x10*3/uL    Monocytes Absolute 0.63 0.10 - 1.00 x10*3/uL    Eosinophils Absolute 0.17 0.00 - 0.70 x10*3/uL    Basophils Absolute 0.07 0.00 - 0.10 x10*3/uL   POCT GLUCOSE   Result Value Ref Range    POCT Glucose 122 (H) 74 - 99 mg/dL   POCT GLUCOSE   Result Value Ref Range    POCT Glucose 210 (H) 74 - 99 mg/dL       Physical Exam    Relevant Results  {If you would like to pull in Medications, type .meds     If you would like to pull in Lab results for the last 24 hours, type .[id number]    If you would like to pull in Imaging results, type .imgrslt :99}      {Link to Stroke Scoring tools - Link :99}                      Assessment & Plan  Lacunar infarct, acute (Multi)    PAD (peripheral artery disease)    Centrilobular emphysema (Multi)    Type II or unspecified type diabetes mellitus with neurological manifestations, uncontrolled(250.62) (Multi)    Hypertensive urgency    Hypercholesteremia    Morbid obesity due to excess calories (Multi)    S/P CABG x 3    ***        ***Pre rounding note - This note prepared and pended in anticipation of rounding. The content, physical exam, assessment and plan are not finalized till note is signed as complete ***         [1]   No current facility-administered medications on file prior to encounter.     Current Outpatient Medications on File Prior to Encounter   Medication Sig Dispense Refill    acetaminophen (Tylenol) 500 mg tablet Take 2 tablets (1,000 mg) by mouth every 6 hours if needed for mild pain (1 - 3).      aspirin 81 mg EC tablet Take 1 tablet (81 mg) by mouth once daily.      atorvastatin  (Lipitor) 40 mg tablet Take 1 tablet (40 mg) by mouth once daily.      clopidogrel (Plavix) 75 mg tablet Take 1 tablet (75 mg) by mouth once daily. 30 tablet 11    glipiZIDE (Glucotrol) 5 mg tablet Take 1 tablet (5 mg) by mouth 2 times a day.      insulin lispro (HumaLOG) 100 unit/mL pen       Lantus Solostar U-100 Insulin 100 unit/mL (3 mL) pen [The details of the medication are not available because there are pending changes by a home health clinician.]      losartan (Cozaar) 50 mg tablet Take 1 tablet (50 mg) by mouth once daily.      oxyCODONE-acetaminophen (Percocet) 5-325 mg tablet Take 1 tablet by mouth every 6 hours if needed for severe pain (7 - 10). 21 tablet 0    rivaroxaban (Xarelto) 2.5 mg tablet Take 1 tablet (2.5 mg) by mouth 2 times a day. 60 tablet 11    semaglutide (OZEMPIC) 1 mg/dose (4 mg/3 mL) pen injector Inject 1 mg under the skin every 7 days. Mondays      traMADol (Ultram) 50 mg tablet Take 1 tablet (50 mg) by mouth every 6 hours if needed for severe pain (7 - 10). 20 tablet 0    white petrolatum (Aquaphor) 41 % ointment ointment Apply 1 Application topically once daily. 100 g 0

## 2025-06-17 NOTE — PROGRESS NOTES
Tim Stokes is a 55 y.o. male on day 1 of admission presenting with Lacunar infarct, acute (Multi).      Subjective   Seen earlier today   Does have left sided facial drooping  No pain  Wants to leave asap  [facility]       Objective     Last Recorded Vitals  /72 (BP Location: Left arm, Patient Position: Lying)   Pulse 55   Temp 36.5 °C (97.7 °F) (Temporal)   Resp 18   Wt 131 kg (288 lb)   SpO2 97%   Intake/Output last 3 Shifts:    Intake/Output Summary (Last 24 hours) at 6/17/2025 1429  Last data filed at 6/17/2025 1151  Gross per 24 hour   Intake 240 ml   Output 450 ml   Net -210 ml       Admission Weight  Weight: 138 kg (304 lb 3.8 oz) (06/16/25 1135)    Daily Weight  06/16/25 : 131 kg (288 lb)    Image Results  Electrocardiogram, 12-lead PRN ACS symptoms  Sinus rhythm with 1st degree AV block  Nonspecific T wave abnormality  Abnormal ECG  When compared with ECG of 09-FEB-2024 16:12,  Nonspecific T wave abnormality now evident in Lateral leads      Physical Exam    No distress  Flat affect  Face left sided facial drooping   Forehead intact  Chest clear  CVS regular   Ext no edema   Abdo soft nontender bs active  Psych argumentative    Relevant Results    Scheduled medications  Scheduled Medications[1]  Continuous medications  Continuous Medications[2]  PRN medications  PRN Medications[3]  Results for orders placed or performed during the hospital encounter of 06/16/25 (from the past 96 hours)   POCT GLUCOSE   Result Value Ref Range    POCT Glucose 158 (H) 74 - 99 mg/dL   Electrocardiogram, 12-lead PRN ACS symptoms   Result Value Ref Range    Ventricular Rate 68 BPM    Atrial Rate 68 BPM    WV Interval 328 ms    QRS Duration 98 ms    QT Interval 414 ms    QTC Calculation(Bazett) 440 ms    P Axis 51 degrees    R Axis 43 degrees    T Axis 111 degrees    QRS Count 11 beats    Q Onset 216 ms    T Offset 423 ms    QTC Fredericia 431 ms   CBC  and Auto Differential   Result Value Ref Range    WBC 8.3 4.4 - 11.3 x10*3/uL    nRBC 0.0 0.0 - 0.0 /100 WBCs    RBC 5.37 4.50 - 5.90 x10*6/uL    Hemoglobin 15.0 13.5 - 17.5 g/dL    Hematocrit 49.3 41.0 - 52.0 %    MCV 92 80 - 100 fL    MCH 27.9 26.0 - 34.0 pg    MCHC 30.4 (L) 32.0 - 36.0 g/dL    RDW 14.5 11.5 - 14.5 %    Platelets 219 150 - 450 x10*3/uL    Neutrophils % 61.1 40.0 - 80.0 %    Immature Granulocytes %, Automated 0.4 0.0 - 0.9 %    Lymphocytes % 28.1 13.0 - 44.0 %    Monocytes % 7.3 2.0 - 10.0 %    Eosinophils % 2.4 0.0 - 6.0 %    Basophils % 0.7 0.0 - 2.0 %    Neutrophils Absolute 5.04 1.20 - 7.70 x10*3/uL    Immature Granulocytes Absolute, Automated 0.03 0.00 - 0.70 x10*3/uL    Lymphocytes Absolute 2.32 1.20 - 4.80 x10*3/uL    Monocytes Absolute 0.60 0.10 - 1.00 x10*3/uL    Eosinophils Absolute 0.20 0.00 - 0.70 x10*3/uL    Basophils Absolute 0.06 0.00 - 0.10 x10*3/uL   Comprehensive metabolic panel   Result Value Ref Range    Glucose 131 (H) 74 - 99 mg/dL    Sodium 138 136 - 145 mmol/L    Potassium 4.1 3.5 - 5.3 mmol/L    Chloride 106 98 - 107 mmol/L    Bicarbonate 21 21 - 32 mmol/L    Anion Gap 15 10 - 20 mmol/L    Urea Nitrogen 19 6 - 23 mg/dL    Creatinine 0.68 0.50 - 1.30 mg/dL    eGFR >90 >60 mL/min/1.73m*2    Calcium 9.1 8.6 - 10.3 mg/dL    Albumin 3.7 3.4 - 5.0 g/dL    Alkaline Phosphatase 82 33 - 120 U/L    Total Protein 7.3 6.4 - 8.2 g/dL    AST 10 9 - 39 U/L    Bilirubin, Total 0.3 0.0 - 1.2 mg/dL    ALT 6 (L) 10 - 52 U/L   Magnesium   Result Value Ref Range    Magnesium 1.77 1.60 - 2.40 mg/dL   Troponin I, High Sensitivity   Result Value Ref Range    Troponin I, High Sensitivity 11 0 - 20 ng/L   Lipid Panel   Result Value Ref Range    Cholesterol 178 0 - 199 mg/dL    HDL-Cholesterol 39.5 mg/dL    Cholesterol/HDL Ratio 4.5     LDL Calculated 114 (H) <=99 mg/dL    VLDL 25 0 - 40 mg/dL    Triglycerides 124 0 - 149 mg/dL    Non HDL Cholesterol 139 0 - 149 mg/dL   Hemoglobin A1C   Result Value  Ref Range    Hemoglobin A1C 7.6 (H) See comment %    Estimated Average Glucose 171 Not Established mg/dL   Protime-INR   Result Value Ref Range    Protime 12.6 (H) 9.8 - 12.4 seconds    INR 1.1 0.9 - 1.1   POCT GLUCOSE   Result Value Ref Range    POCT Glucose 127 (H) 74 - 99 mg/dL   B-Type Natriuretic Peptide   Result Value Ref Range    BNP 92 0 - 99 pg/mL   Basic Metabolic Panel   Result Value Ref Range    Glucose 106 (H) 74 - 99 mg/dL    Sodium 138 136 - 145 mmol/L    Potassium 4.1 3.5 - 5.3 mmol/L    Chloride 108 (H) 98 - 107 mmol/L    Bicarbonate 20 (L) 21 - 32 mmol/L    Anion Gap 14 10 - 20 mmol/L    Urea Nitrogen 17 6 - 23 mg/dL    Creatinine 0.68 0.50 - 1.30 mg/dL    eGFR >90 >60 mL/min/1.73m*2    Calcium 8.9 8.6 - 10.3 mg/dL   CBC and Auto Differential   Result Value Ref Range    WBC 7.4 4.4 - 11.3 x10*3/uL    nRBC 0.0 0.0 - 0.0 /100 WBCs    RBC 4.98 4.50 - 5.90 x10*6/uL    Hemoglobin 14.0 13.5 - 17.5 g/dL    Hematocrit 44.0 41.0 - 52.0 %    MCV 88 80 - 100 fL    MCH 28.1 26.0 - 34.0 pg    MCHC 31.8 (L) 32.0 - 36.0 g/dL    RDW 14.6 (H) 11.5 - 14.5 %    Platelets 221 150 - 450 x10*3/uL    Neutrophils % 56.9 40.0 - 80.0 %    Immature Granulocytes %, Automated 0.3 0.0 - 0.9 %    Lymphocytes % 31.1 13.0 - 44.0 %    Monocytes % 8.5 2.0 - 10.0 %    Eosinophils % 2.3 0.0 - 6.0 %    Basophils % 0.9 0.0 - 2.0 %    Neutrophils Absolute 4.20 1.20 - 7.70 x10*3/uL    Immature Granulocytes Absolute, Automated 0.02 0.00 - 0.70 x10*3/uL    Lymphocytes Absolute 2.30 1.20 - 4.80 x10*3/uL    Monocytes Absolute 0.63 0.10 - 1.00 x10*3/uL    Eosinophils Absolute 0.17 0.00 - 0.70 x10*3/uL    Basophils Absolute 0.07 0.00 - 0.10 x10*3/uL   POCT GLUCOSE   Result Value Ref Range    POCT Glucose 122 (H) 74 - 99 mg/dL   POCT GLUCOSE   Result Value Ref Range    POCT Glucose 210 (H) 74 - 99 mg/dL                               Assessment & Plan  Lacunar infarct, acute (Multi)    PAD (peripheral artery disease)    Centrilobular emphysema  (Multi)    Type II or unspecified type diabetes mellitus with neurological manifestations, uncontrolled(250.62) (Multi)    Hypertensive urgency    Hypercholesteremia    Morbid obesity due to excess calories (Multi)    S/P CABG x 3    Resumed home meds   However was not taking it pta  Neuro input pending   Resume xarelto and plavix   Takes xarelto low dose for PAD  Plan for dc later today           Den Louise MD           [1] aspirin, 81 mg, oral, Daily   Or  aspirin, 81 mg, oral, Daily   Or  aspirin, 81 mg, nasogastric tube, Daily   Or  aspirin, 300 mg, rectal, Daily  atorvastatin, 40 mg, oral, Daily  clopidogrel, 75 mg, oral, Daily  glipiZIDE, 5 mg, oral, BID  insulin glargine, 30 Units, subcutaneous, BID  insulin lispro, 0-10 Units, subcutaneous, TID AC  losartan, 50 mg, oral, Daily  rivaroxaban, 2.5 mg, oral, BID  sennosides, 2 tablet, oral, BID  [2]    [3] PRN medications: acetaminophen **OR** acetaminophen **OR** acetaminophen, dextrose, dextrose, glucagon, glucagon, oxyCODONE-acetaminophen, oxygen

## 2025-06-17 NOTE — H&P
History Of Present Illness  Tim Stokes is a 55 y.o. male presenting with slurred speech and L facial weakness  Pt has hx of PAD, chronic ulcer of left leg, left TMA, HTN, DM on insulin with neuropathy, HTN, lives at home , home bound, cad   Here for eval as above symptoms started about 3 days prior to coming to ER  Pt did have CT brain with no acute change   Admitted for further eval   No chest pain   No shrotness of breath      Past Medical History  He has a past medical history of Acute decompensated heart failure, Atherosclerosis of both lower extremities with bilateral ulceration (Multi), Chronic ulcer of left calf with fat layer exposed (Multi), Chronic ulcer of left foot with necrosis of muscle (Multi), Chronic ulcer of right calf with fat layer exposed (Multi), Critical limb ischemia of both lower extremities (Multi), Diabetic vasculopathy, Emphysema lung (Multi), Gangrene of toe of left foot (Multi), Hyperlipidemia, Hypertension, Osteomyelitis, PAD (peripheral artery disease), Peripheral neuropathy, Severe sepsis, Type 2 diabetes mellitus, Venous ulcer of left leg (Multi), Vision loss, and Wound infection.    Surgical History  He has a past surgical history that includes Invasive Vascular Procedure (N/A, 02/09/2024); Coronary artery bypass graft; Toe amputation; Invasive Vascular Procedure (Right, 8/21/2024); and Invasive Vascular Procedure (N/A, 8/21/2024).     Social History  He reports that he has been smoking cigarettes. He has been exposed to tobacco smoke. He has never used smokeless tobacco. He reports that he does not currently use alcohol. He reports that he does not use drugs.    Family History  Family History[1]     Allergies  Patient has no known allergies.    Review of Systems     No chest pain   Noshortness of breath   No nasuea vomting   Does not want to be in hospital   No change in urine    Physical Exam  Patient is well-developed obese  male he is alert oriented x 3 affect is  flat  Head eye ENT exam shows face is symmetrical oral mucosa is moist and pink.  Neck is supple there is no JVD or carotid bruit.  CVS exam shows heart sounds are regular.  Respiratory exam shows good air entry no rales.  Abdomen is soft bowel sounds are active no organomegaly or masses.  Extremities patient does have bilateral compression bandages.  CNS patient is alert oriented x 3 speech is intact motor exam is limited however appears to be nonfocal gait could not be tested  Does have Left TMA    Last Recorded Vitals  /81   Pulse 76   Temp 37.1 °C (98.7 °F) (Temporal)   Resp 19   Wt 131 kg (288 lb)   SpO2 98%     Relevant Results  Scheduled medications  Scheduled Medications[2]  Continuous medications  Continuous Medications[3]  PRN medications  PRN Medications[4]  Results for orders placed or performed during the hospital encounter of 06/16/25 (from the past 96 hours)   POCT GLUCOSE   Result Value Ref Range    POCT Glucose 158 (H) 74 - 99 mg/dL   CBC and Auto Differential   Result Value Ref Range    WBC 8.3 4.4 - 11.3 x10*3/uL    nRBC 0.0 0.0 - 0.0 /100 WBCs    RBC 5.37 4.50 - 5.90 x10*6/uL    Hemoglobin 15.0 13.5 - 17.5 g/dL    Hematocrit 49.3 41.0 - 52.0 %    MCV 92 80 - 100 fL    MCH 27.9 26.0 - 34.0 pg    MCHC 30.4 (L) 32.0 - 36.0 g/dL    RDW 14.5 11.5 - 14.5 %    Platelets 219 150 - 450 x10*3/uL    Neutrophils % 61.1 40.0 - 80.0 %    Immature Granulocytes %, Automated 0.4 0.0 - 0.9 %    Lymphocytes % 28.1 13.0 - 44.0 %    Monocytes % 7.3 2.0 - 10.0 %    Eosinophils % 2.4 0.0 - 6.0 %    Basophils % 0.7 0.0 - 2.0 %    Neutrophils Absolute 5.04 1.20 - 7.70 x10*3/uL    Immature Granulocytes Absolute, Automated 0.03 0.00 - 0.70 x10*3/uL    Lymphocytes Absolute 2.32 1.20 - 4.80 x10*3/uL    Monocytes Absolute 0.60 0.10 - 1.00 x10*3/uL    Eosinophils Absolute 0.20 0.00 - 0.70 x10*3/uL    Basophils Absolute 0.06 0.00 - 0.10 x10*3/uL   Comprehensive metabolic panel   Result Value Ref Range    Glucose 131  (H) 74 - 99 mg/dL    Sodium 138 136 - 145 mmol/L    Potassium 4.1 3.5 - 5.3 mmol/L    Chloride 106 98 - 107 mmol/L    Bicarbonate 21 21 - 32 mmol/L    Anion Gap 15 10 - 20 mmol/L    Urea Nitrogen 19 6 - 23 mg/dL    Creatinine 0.68 0.50 - 1.30 mg/dL    eGFR >90 >60 mL/min/1.73m*2    Calcium 9.1 8.6 - 10.3 mg/dL    Albumin 3.7 3.4 - 5.0 g/dL    Alkaline Phosphatase 82 33 - 120 U/L    Total Protein 7.3 6.4 - 8.2 g/dL    AST 10 9 - 39 U/L    Bilirubin, Total 0.3 0.0 - 1.2 mg/dL    ALT 6 (L) 10 - 52 U/L   Magnesium   Result Value Ref Range    Magnesium 1.77 1.60 - 2.40 mg/dL   Troponin I, High Sensitivity   Result Value Ref Range    Troponin I, High Sensitivity 11 0 - 20 ng/L   Protime-INR   Result Value Ref Range    Protime 12.6 (H) 9.8 - 12.4 seconds    INR 1.1 0.9 - 1.1       CT scan of the brain reviewed no acute issues noted     Assessment/Plan   Assessment & Plan  Cerebrovascular accident (CVA), unspecified mechanism (Multi)    PAD (peripheral artery disease)    Centrilobular emphysema (Multi)    DM (diabetes mellitus) type II, controlled, with peripheral vascular disorder    Essential hypertension, benign    Hypercholesteremia    Morbid obesity due to excess calories (Multi)    S/P CABG x 3      Continue with the stroke protocol continue with the statin continue with the anticoagulation therapy patient is on low-dose Xarelto.  He is on Plavix.  Will continue with the same check an MRI of the brain.  Check lipids and the hemoglobin A1c please see orders for further details  Cont with sliding scale insulin  [facility] reviewed [facility]  Has been getting home care for his legs and wound care         Den Louise MD         [1] No family history on file.  [2] [3] [4]

## 2025-06-17 NOTE — CARE PLAN
The patient's goals for the shift include      The clinical goals for the shift include Pt. will remain HDS throughout shift    Over the shift, the patient did not make progress toward the following goals. Barriers to progression include. Recommendations to address these barriers include.    Problem: Pain - Adult  Goal: Verbalizes/displays adequate comfort level or baseline comfort level  Outcome: Progressing     Problem: Safety - Adult  Goal: Free from fall injury  Outcome: Progressing     Problem: Nutrition  Goal: Nutrient intake appropriate for maintaining nutritional needs  Outcome: Progressing     Problem: General Stroke  Goal: Maintain BP within ordered limits throughout shift  Outcome: Progressing     Problem: General Stroke  Goal: Participate in treatment (ie., meds, therapy) throughout shift  Outcome: Progressing     Problem: General Stroke  Goal: Controlled blood glucose throughout shift  Outcome: Progressing

## 2025-06-17 NOTE — CARE PLAN
Problem: Pain - Adult  Goal: Verbalizes/displays adequate comfort level or baseline comfort level  Outcome: Progressing     Problem: Safety - Adult  Goal: Free from fall injury  Outcome: Progressing     Problem: Discharge Planning  Goal: Discharge to home or other facility with appropriate resources  Outcome: Progressing     Problem: Chronic Conditions and Co-morbidities  Goal: Patient's chronic conditions and co-morbidity symptoms are monitored and maintained or improved  Outcome: Progressing     Problem: Nutrition  Goal: Nutrient intake appropriate for maintaining nutritional needs  Outcome: Progressing     Problem: General Stroke  Goal: Establish a mutual long term goal with patient by discharge  Outcome: Progressing  Goal: Demonstrate improvement in neurological exam throughout the shift  Outcome: Progressing  Goal: Maintain BP within ordered limits throughout shift  Outcome: Progressing  Goal: Participate in treatment (ie., meds, therapy) throughout shift  Outcome: Progressing  Goal: No symptoms of aspiration throughout shift  Outcome: Progressing  Goal: No symptoms of hemorrhage throughout shift  Outcome: Progressing  Goal: Tolerate enteral feeding throughout shift  Outcome: Progressing  Goal: Decreased nausea/vomiting throughout shift  Outcome: Progressing  Goal: Controlled blood glucose throughout shift  Outcome: Progressing  Goal: Out of bed three times today  Outcome: Progressing     Problem: ICU Stroke  Goal: Maintain ICP within ordered limits throughout shift  Outcome: Progressing  Goal: Tolerate EVD clamping trial throughout shift  Outcome: Progressing  Goal: Tolerate ventilator weaning trial during shift  Outcome: Progressing  Goal: Maintain patent airway throughout shift  Outcome: Progressing  Goal: Achieve/maintain targeted sodium level throughout shift  Outcome: Progressing     Problem: Skin  Goal: Decreased wound size/increased tissue granulation at next dressing change  Outcome:  Progressing  Goal: Participates in plan/prevention/treatment measures  Outcome: Progressing  Goal: Prevent/manage excess moisture  Outcome: Progressing  Goal: Prevent/minimize sheer/friction injuries  Outcome: Progressing  Goal: Promote/optimize nutrition  Outcome: Progressing  Goal: Promote skin healing  Outcome: Progressing     Problem: Fall/Injury  Goal: Not fall by end of shift  Outcome: Progressing  Goal: Be free from injury by end of the shift  Outcome: Progressing  Goal: Verbalize understanding of personal risk factors for fall in the hospital  Outcome: Progressing  Goal: Verbalize understanding of risk factor reduction measures to prevent injury from fall in the home  Outcome: Progressing  Goal: Use assistive devices by end of the shift  Outcome: Progressing  Goal: Pace activities to prevent fatigue by end of the shift  Outcome: Progressing   The patient's goals for the shift include  stay safe     The clinical goals for the shift include monitor neuro symptoms and vital signs

## 2025-06-17 NOTE — CONSULTS
Consult to Neurology   Referred by: Den Louise MD , PCP: Jany Nina MD    History Of Present Illness  Tim Stokes 55 y.o. male admitted 6/16/2025 LOS day 1, consulted for stroke.   Presented to [facility] with several days of dysarthria, facial droop, NIHSS 3,. Not candidate for TNK/ intervention.   CT/ MRI- acute R basal ganglia lacunar infarct, microvascular disease and volume loss.  MRA- head/neck- no LVO or significant stenoses.    Complex vascular history- severe PAD with bilateral leg ulcers, HTN with urgency SBP >180s, nonadherence to medications, active smoking, HPL-  with low HDL 39, uncontrolled diabetes A1c 12.7% several months ago.   Acknowledges his responsibility but reports he stopped all meds except insulin, cites confusion as to whether  consultants or F PCP were prescribing, [facility] did not confirm his home meds with med rec and sent to mail order that did not renew.  Requests all rx to [facility] and will followup with PCP at [facility].   Smoking 1ppd, agrees this is mentioned at his visits, no plan to quit at present.     Pre-admit/ Baseline modified [place] Score      Past Medical History  Medical History[1]  Surgical History  Surgical History[2]  Social History  Social History[3]  Allergies  Reviewed in EMR    Objective   Last Recorded Vitals  Blood pressure 170/68, pulse 54, temperature 36.5 °C (97.7 °F), temperature source Temporal, resp. rate 17, height 1.829 m (6'), weight 131 kg (288 lb), SpO2 94%.  NIHSS   NIH Stroke Scale  1A. Level of Consciousness: Alert, Keenly Responsive  1B. Ask Month and Age: Both Questions Right  1C. Blink Eyes & Squeeze Hands: Performs Both Tasks  2. Best Gaze: Normal  3. Visual: No Visual Loss  4. Facial Palsy: Partial Paralysis  5A. Motor - Left Arm: No Drift  5B. Motor - Right Arm: No Drift  6A. Motor - Left Leg: No Drift  6B. Motor - Right Leg: No Drift  7. Limb Ataxia: Absent  8. Sensory Loss: Normal  9. Best Language: No Aphasia  10.  Dysarthria: Normal  11. Extinction and Inattention: No Abnormality  NIH Stroke Scale: 2        Reviewed relevant results, independent review of imaging:   CT head wo IV contrast  Result Date: 6/16/2025  Interpreted By:  Mica Baer, STUDY: CT HEAD WO IV CONTRAST;  6/16/2025 1:26 pm   INDICATION: Signs/Symptoms:L facial weakness, L arm drift, slurred speech.     COMPARISON: None.   ACCESSION NUMBER(S): [id number]   ORDERING CLINICIAN: REY LAM   TECHNIQUE: Unenhanced CT images of the head were obtained.   FINDINGS: Patient is rotated.  The ventricles, cisterns and sulci are prominent, consistent with diffuse volume loss. There are areas of nonspecific white matter hypodensity, which are probably age related or microvascular in nature.  There is no acute intracranial hemorrhage, mass effect or midline shift. No extraaxial fluid collection.   No focal calvarial lesion.   Visualized paranasal sinuses are clear.       No acute intracranial hemorrhage or mass-effect.   MACRO: None.   Signed by: Mica Baer 6/16/2025 1:36 PM Dictation workstation:   [id number]    MR brain wo IV contrast  Result Date: 6/16/2025  Interpreted By:  Bhavesh Deras, STUDY: MR BRAIN WO IV CONTRAST; MR ANGIO NECK WO IV CONTRAST; MR ANGIO HEAD WO IV CONTRAST;  6/16/2025 8:55 pm   INDICATION: Signs/Symptoms:CVA w/u.  No pneumothorax. No pleural effusion. Bronchial thickening.   There is septal thickening suggesting component of     ACCESSION NUMBER(S): [id number]; [id number]; [id number]   ORDERING CLINICIAN: REY LAM   TECHNIQUE: Axial T2, FLAIR, DWI, gradient echo T2 and  sagittal and coronal T1 weighted images of brain were acquired. Post contrast T1 weighted images were acquired after administration of N/A N/A intravenous contrast administration.   Time-of-flight MRA of the head  and neck was performed. The images were reviewed as source images and maximum intensity projections.   FINDINGS: Brain:   CSF Spaces: The  ventricles, sulci and basal cisterns are within normal limits.   Parenchyma: 6 x 4 mm right basal ganglia lacunar infarct. Reference image 92. Mild global volume loss and chronic small vessel ischemic change seen in the periventricular white matter and brainstem there is no mass effect or midline shift.   Paranasal Sinuses and Mastoids: No evidence of mastoid or paranasal sinus opacification   MRA of head:   Anterior circulation:    There is expected flow signal in bilateral intracranial internal carotid arteries, bilateral carotid terminals, bilateral proximal anterior and middle cerebral arteries.   Posterior circulation:    Bilateral intracranial vertebral arteries, vertebrobasilar junction, basilar artery and proximal posterior cerebral arteries demonstrate expected flow signal. Fetal circulation on the left   MRA of neck:   The source images are mildly degraded by artifact.   Right carotid vessels:  There is expected flow signal in the visualized portion of the common carotid artery.  There is mild attenuation of flow signal at the carotid bifurcation which may be secondary to flow related artifact. The internal carotid artery in the neck demonstrates expected flow signal.  There is no significant% stenosis  .   Left carotid vessels:   There is expected flow signal in the visualized portion of the common carotid artery.  There is mild attenuation of flow signal at the carotid bifurcation which may be secondary to flow related artifact. The internal carotid artery in the neck demonstrates expected flow signal.  There is no significant% stenosis  .   Vertebral vessels:   The visualized segments of the cervical vertebral arteries demonstrate expected flow signal.       MRI Brain:   6 x 5 mm lacunar type acute infarct in the right basal ganglia   Mild global volume loss and chronic small vessel ischemic change seen in the periventricular white matter. Degree is advanced for age.   MRA:   No evidence of major vessel  cutoff or significant stenosis on MRA head and neck.   MACRO: None   Signed by: Bhavesh Deras 6/16/2025 11:02 PM Dictation workstation:   [id number]      No results found for this or any previous visit (from the past 4464 hours).   No echocardiogram results found for the past 14 days  No results found for this or any previous visit.          BNP   Date/Time Value Ref Range Status   06/16/2025 10:20 PM 92 0 - 99 pg/mL Final     Results for orders placed or performed during the hospital encounter of 06/16/25 (from the past 24 hours)   POCT GLUCOSE   Result Value Ref Range    POCT Glucose 158 (H) 74 - 99 mg/dL   CBC and Auto Differential   Result Value Ref Range    WBC 8.3 4.4 - 11.3 x10*3/uL    nRBC 0.0 0.0 - 0.0 /100 WBCs    RBC 5.37 4.50 - 5.90 x10*6/uL    Hemoglobin 15.0 13.5 - 17.5 g/dL    Hematocrit 49.3 41.0 - 52.0 %    MCV 92 80 - 100 fL    MCH 27.9 26.0 - 34.0 pg    MCHC 30.4 (L) 32.0 - 36.0 g/dL    RDW 14.5 11.5 - 14.5 %    Platelets 219 150 - 450 x10*3/uL    Neutrophils % 61.1 40.0 - 80.0 %    Immature Granulocytes %, Automated 0.4 0.0 - 0.9 %    Lymphocytes % 28.1 13.0 - 44.0 %    Monocytes % 7.3 2.0 - 10.0 %    Eosinophils % 2.4 0.0 - 6.0 %    Basophils % 0.7 0.0 - 2.0 %    Neutrophils Absolute 5.04 1.20 - 7.70 x10*3/uL    Immature Granulocytes Absolute, Automated 0.03 0.00 - 0.70 x10*3/uL    Lymphocytes Absolute 2.32 1.20 - 4.80 x10*3/uL    Monocytes Absolute 0.60 0.10 - 1.00 x10*3/uL    Eosinophils Absolute 0.20 0.00 - 0.70 x10*3/uL    Basophils Absolute 0.06 0.00 - 0.10 x10*3/uL   Comprehensive metabolic panel   Result Value Ref Range    Glucose 131 (H) 74 - 99 mg/dL    Sodium 138 136 - 145 mmol/L    Potassium 4.1 3.5 - 5.3 mmol/L    Chloride 106 98 - 107 mmol/L    Bicarbonate 21 21 - 32 mmol/L    Anion Gap 15 10 - 20 mmol/L    Urea Nitrogen 19 6 - 23 mg/dL    Creatinine 0.68 0.50 - 1.30 mg/dL    eGFR >90 >60 mL/min/1.73m*2    Calcium 9.1 8.6 - 10.3 mg/dL    Albumin 3.7 3.4 - 5.0 g/dL    Alkaline  Phosphatase 82 33 - 120 U/L    Total Protein 7.3 6.4 - 8.2 g/dL    AST 10 9 - 39 U/L    Bilirubin, Total 0.3 0.0 - 1.2 mg/dL    ALT 6 (L) 10 - 52 U/L   Magnesium   Result Value Ref Range    Magnesium 1.77 1.60 - 2.40 mg/dL   Troponin I, High Sensitivity   Result Value Ref Range    Troponin I, High Sensitivity 11 0 - 20 ng/L   Lipid Panel   Result Value Ref Range    Cholesterol 178 0 - 199 mg/dL    HDL-Cholesterol 39.5 mg/dL    Cholesterol/HDL Ratio 4.5     LDL Calculated 114 (H) <=99 mg/dL    VLDL 25 0 - 40 mg/dL    Triglycerides 124 0 - 149 mg/dL    Non HDL Cholesterol 139 0 - 149 mg/dL   Protime-INR   Result Value Ref Range    Protime 12.6 (H) 9.8 - 12.4 seconds    INR 1.1 0.9 - 1.1   POCT GLUCOSE   Result Value Ref Range    POCT Glucose 127 (H) 74 - 99 mg/dL   B-Type Natriuretic Peptide   Result Value Ref Range    BNP 92 0 - 99 pg/mL   Basic Metabolic Panel   Result Value Ref Range    Glucose 106 (H) 74 - 99 mg/dL    Sodium 138 136 - 145 mmol/L    Potassium 4.1 3.5 - 5.3 mmol/L    Chloride 108 (H) 98 - 107 mmol/L    Bicarbonate 20 (L) 21 - 32 mmol/L    Anion Gap 14 10 - 20 mmol/L    Urea Nitrogen 17 6 - 23 mg/dL    Creatinine 0.68 0.50 - 1.30 mg/dL    eGFR >90 >60 mL/min/1.73m*2    Calcium 8.9 8.6 - 10.3 mg/dL   CBC and Auto Differential   Result Value Ref Range    WBC 7.4 4.4 - 11.3 x10*3/uL    nRBC 0.0 0.0 - 0.0 /100 WBCs    RBC 4.98 4.50 - 5.90 x10*6/uL    Hemoglobin 14.0 13.5 - 17.5 g/dL    Hematocrit 44.0 41.0 - 52.0 %    MCV 88 80 - 100 fL    MCH 28.1 26.0 - 34.0 pg    MCHC 31.8 (L) 32.0 - 36.0 g/dL    RDW 14.6 (H) 11.5 - 14.5 %    Platelets 221 150 - 450 x10*3/uL    Neutrophils % 56.9 40.0 - 80.0 %    Immature Granulocytes %, Automated 0.3 0.0 - 0.9 %    Lymphocytes % 31.1 13.0 - 44.0 %    Monocytes % 8.5 2.0 - 10.0 %    Eosinophils % 2.3 0.0 - 6.0 %    Basophils % 0.9 0.0 - 2.0 %    Neutrophils Absolute 4.20 1.20 - 7.70 x10*3/uL    Immature Granulocytes Absolute, Automated 0.02 0.00 - 0.70 x10*3/uL     Lymphocytes Absolute 2.30 1.20 - 4.80 x10*3/uL    Monocytes Absolute 0.63 0.10 - 1.00 x10*3/uL    Eosinophils Absolute 0.17 0.00 - 0.70 x10*3/uL    Basophils Absolute 0.07 0.00 - 0.10 x10*3/uL   POCT GLUCOSE   Result Value Ref Range    POCT Glucose 122 (H) 74 - 99 mg/dL         Problem List/ Recommendations/ Plan:   Assessment & Plan  Lacunar infarct, acute (Multi)  More related to a lack of risk factor control to goal than failure of antithrombotic therapy.   Recommend Stroke Best Practices (in the focused Stroke Order Set)  Antithrombotic therapy for stroke prevention: should take into consideration the optimum therapy for PAD management- recent med list from Vascular followup outline aggressive regimen- rivaroxaban 2.5mg twice daily, cilostazol 50mg twice daily, aspirin 81mg and clopidogrel 75mg daily- OK to continue from neuro perspective.    Vascular Risk Factor modification goals:  Blood pressure goals: avoid hypotension SBP <100 that could worsen cerebral perfusion, as he is days out from onset of symptoms, would treat to goal blood pressure <130/80 by lowering 10-15% daily as tolerated.   Lipid Goals: education on healthy diet and statin therapy to maintain or achieve goal LDL-cholesterol < 70mg.or more aggressive cardiac goal <55mg/dl.  Glucose Goals: early treatment of hyperglycemia to goal glucose 140-180 mg/dl with long-term goal A1c < 7%   Smoking Cessation and Education  Assessment for Rehabilitation- no new needs after discharge but baseline issues related to PAD/ homecare for wound therapy.   Patient/ family education on signs and symptoms of stroke, calling 911, their risk factors, and healthy strategies for stroke prevention.      PAD (peripheral artery disease)  Receiving homecare for a long time to manage leg ulcers, unable to leave the home.   Centrilobular emphysema (Multi)    Type II or unspecified type diabetes mellitus with neurological manifestations, uncontrolled(250.62)  (Multi)    Hypertensive urgency    Hypercholesteremia    Morbid obesity due to excess calories (Multi)    S/P CABG x 3    Will sign off at this time, re-consult as needed. OK to DC from neuro perspective  After discharge: followup with PCP: Jany Nina MD   He requests rx sent to [facility].     Reviewed management of multiple medical conditions and vascular risk factors to goal, supportive care and rehabilitation to recovery.   Full Code    Total time today includes EMR review, data including history, test results, neuroimaging review and interpretation, patient assessment and recommendations for testing and treatment, discussion of diagnoses, goals of care plan, education and discharge 78 min.        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 (Multi)     Chronic ulcer of right calf with fat layer exposed (Multi)     Critical limb ischemia of both lower extremities (Multi)     Diabetic vasculopathy     Emphysema lung (Multi)     Gangrene of toe of left foot (Multi)     Hyperlipidemia     Hypertension     Osteomyelitis     PAD (peripheral artery disease)     Peripheral neuropathy     Severe sepsis     Type 2 diabetes mellitus     Venous ulcer of left leg (Multi)     Vision loss     Wound infection    [2]   Past Surgical History:  Procedure Laterality Date    CORONARY ARTERY BYPASS GRAFT      INVASIVE VASCULAR PROCEDURE N/A 02/09/2024    Procedure: Lower Extremity Angiogram;  Surgeon: Jorge Boone DO;  Location: [place] [address] Cardiac Cath Lab;  Service: Cardiovascular;  Laterality: N/A;  CPT CODE 83350    INVASIVE VASCULAR PROCEDURE Right 8/21/2024    Procedure: Lower Extremity Angiogram;  Surgeon: Jorge Boone DO;  Location: [facility]  "2F Cardiac Cath Lab;  Service: Cardiovascular;  Laterality: Right;    INVASIVE VASCULAR PROCEDURE N/A 8/21/2024    Procedure: Lower Extremity Intervention;  Surgeon: Jorge Boone DO;  Location: [facility] 2F Cardiac Cath Lab;  Service: Cardiovascular;  Laterality: N/A;    TOE AMPUTATION     [3]   Social History  Tobacco Use    Smoking status: Every Day     Types: Cigarettes     Passive exposure: Current    Smokeless tobacco: Never    Tobacco comments:     Patient says he is trying to quit. Does not quantify how much he is smoking but says that it is just a few \"puffs here and there\"   Substance Use Topics    Alcohol use: Not Currently    Drug use: Never     "

## 2025-06-17 NOTE — PROGRESS NOTES
Care Coordinator Note:    Plan: patient in with L facial droop, slurred speech. MRI - lacunar infacrt. Pending Neuro consult. Echo pending. PT OT pending    Status: inpatient  Payor: [employer]  Disposition: Home alone with [facility] PT PT RN  Barrier: Neuro clearance, ECHO  ADOD: later today vs tomorrow.     Rachael Membrenokolowski [facility]      06/17/25 1441   Discharge Planning   Expected Discharge Disposition Home Health  ([facility] PT OT)   Patient Choice   Provider Choice list and CMS website (https://medicare.gov/care-compare#search) for post-acute Quality and Resource Measure Data were provided and reviewed with: Patient   Patient / Family choosing to utilize agency / facility established prior to hospitalization Yes   Intensity of Service   Intensity of Service 0-30 min

## 2025-06-17 NOTE — PROGRESS NOTES
Occupational Therapy                 Therapy Communication Note    Patient Name: Tim Stokes  MRN: [id number]  Department: [place] [address]  Room: [address] [place] [address]  Today's Date: 6/17/2025     Discipline: Occupational Therapy    Missed Visit: OT Missed Visit: Yes     Missed Visit Reason: Missed Visit Reason:  (OT orders received;pt sitting up on side of bed dressing himself upon entry.Pt denies need for eval/intervention. Pt states he is waiting for medical team member to return. Discharge AMA paper on pt table tray. Eval not complete.)    Missed Time: Attempt    Comment: Pt states he has his ride waiting for him at hospital entrance.

## 2025-06-17 NOTE — ASSESSMENT & PLAN NOTE
More related to a lack of risk factor control to goal than failure of antithrombotic therapy.   Recommend Stroke Best Practices (in the focused Stroke Order Set)  Antithrombotic therapy for stroke prevention: should take into consideration the optimum therapy for PAD management- recent med list from Vascular followup outline aggressive regimen- rivaroxaban 2.5mg twice daily, cilostazol 50mg twice daily, aspirin 81mg and clopidogrel 75mg daily- OK to continue from neuro perspective.    Vascular Risk Factor modification goals:  Blood pressure goals: avoid hypotension SBP <100 that could worsen cerebral perfusion, as he is days out from onset of symptoms, would treat to goal blood pressure <130/80 by lowering 10-15% daily as tolerated.   Lipid Goals: education on healthy diet and statin therapy to maintain or achieve goal LDL-cholesterol < 70mg.or more aggressive cardiac goal <55mg/dl.  Glucose Goals: early treatment of hyperglycemia to goal glucose 140-180 mg/dl with long-term goal A1c < 7%   Smoking Cessation and Education  Assessment for Rehabilitation- no new needs after discharge but baseline issues related to PAD/ homecare for wound therapy.   Patient/ family education on signs and symptoms of stroke, calling 911, their risk factors, and healthy strategies for stroke prevention.

## 2025-06-17 NOTE — PROGRESS NOTES
Speech-Language Pathology                 Therapy Communication Note    Patient Name: Tim Stokes  MRN: [id number]  Department: [place] [address]  Room: [address] [place] [address]  Today's Date: 6/17/2025   Time of attempt: 1434    Discipline: Speech Language Pathology    Missed Visit Reason: Pt receiving echo at time of attempt.    Missed Time: Attempt

## 2025-06-18 NOTE — DISCHARGE SUMMARY
Discharge Diagnosis  Lacunar infarct, acute (Multi)           Issues Requiring Follow-Up  Follow up with PCP    Discharge Meds     Medication List      ASK your doctor about these medications     acetaminophen 500 mg tablet; Commonly known as: Tylenol   aspirin 81 mg EC tablet   atorvastatin 40 mg tablet; Commonly known as: Lipitor   clopidogrel 75 mg tablet; Commonly known as: Plavix; Take 1 tablet (75   mg) by mouth once daily.   glipiZIDE 5 mg tablet; Commonly known as: Glucotrol   insulin lispro 100 unit/mL pen; Commonly known as: HumaLOG   Lantus Solostar U-100 Insulin 100 unit/mL (3 mL) pen; Generic drug:   insulin glargine   losartan 50 mg tablet; Commonly known as: Cozaar   oxyCODONE-acetaminophen 5-325 mg tablet; Commonly known as: Percocet;   Take 1 tablet by mouth every 6 hours if needed for severe pain (7 - 10).   semaglutide 1 mg/dose (4 mg/3 mL) pen injector; Commonly known as:   OZEMPIC   traMADol 50 mg tablet; Commonly known as: Ultram; Take 1 tablet (50 mg)   by mouth every 6 hours if needed for severe pain (7 - 10).   white petrolatum 41 % ointment; Commonly known as: Aquaphor; Apply 1   Application topically once daily.   Xarelto 2.5 mg tablet; Generic drug: rivaroxaban; Take 1 tablet (2.5 mg)   by mouth 2 times a day.       Test Results Pending At Discharge  Pending Labs       No current pending labs.            Hospital Course   Pt admtted for left facial weakness and slurred speech   Work up for stroke done , MRI rt basal ganglion lacunar infart    Echo CONCLUSIONS:   1. Left ventricular ejection fraction is normal by visual estimate at 60-65%.   2. Poorly visualized anatomical structures due to suboptimal image quality.   3. Spectral Doppler shows an abnormal pattern of left ventricular diastolic filling.   4. There is moderately increased septal thickness.   5. There is normal right ventricular global systolic function.    Seen neuro   Results of echo pending at the time of discharge  Pt left  AMA    Pertinent Physical Exam At Time of Discharge  Physical Exam    Outpatient Follow-Up  No future appointments.      Den Louise MD

## 2025-06-23 LAB
ATRIAL RATE: 68 BPM
P AXIS: 51 DEGREES
PR INTERVAL: 328 MS
Q ONSET: 216 MS
QRS COUNT: 11 BEATS
QRS DURATION: 98 MS
QT INTERVAL: 414 MS
QTC CALCULATION(BAZETT): 440 MS
QTC FREDERICIA: 431 MS
R AXIS: 43 DEGREES
T AXIS: 111 DEGREES
T OFFSET: 423 MS
VENTRICULAR RATE: 68 BPM

## 2025-07-22 PROCEDURE — RXMED WILLOW AMBULATORY MEDICATION CHARGE

## 2025-07-24 ENCOUNTER — PHARMACY VISIT (OUTPATIENT)
Dept: PHARMACY | Facility: CLINIC | Age: 55
End: 2025-07-24
Payer: COMMERCIAL

## 2025-08-27 PROCEDURE — RXMED WILLOW AMBULATORY MEDICATION CHARGE

## 2025-08-28 ENCOUNTER — PHARMACY VISIT (OUTPATIENT)
Dept: PHARMACY | Facility: CLINIC | Age: 55
End: 2025-08-28
Payer: COMMERCIAL

## (undated) DEVICE — PACKING, PLAIN, CURITY, 0.25 IN X 5 YD, STERILE

## (undated) DEVICE — Device

## (undated) DEVICE — GUIDEWIRE, STIFF SHAFT, ANGLE TIP, .035 DIA, 260 CM,  3 CM TIP"

## (undated) DEVICE — DRESSING, NON-ADHERENT, OIL EMULSION, CURITY, 3 X 8 IN, STERILE

## (undated) DEVICE — GUIDEWIRE, HI-TORQUE, VERSACORE, 300CM, MODIFIED J

## (undated) DEVICE — SHEATH, INTRODUCER, ACT, 6.5FR 11CML, GREEN, 0.038 IN

## (undated) DEVICE — PACKING, IODOFORM, CURITY, 0.5 IN X 5 YD, STERILE

## (undated) DEVICE — SUTURE, PROLENE, 2-0, 30 IN, SH, BLUE

## (undated) DEVICE — BANDAGE, ELASTIC, ACE, SELF-CLOSURE, 4 IN X 5 YD, NONSTERILE

## (undated) DEVICE — PADDING, UNDERCAST, WEBRIL, 4 IN X 4 YD, REG, NS

## (undated) DEVICE — TOWEL, SURGICAL, NEURO, O/R, 16 X 26, BLUE, STERILE

## (undated) DEVICE — SUTURE, PROLENE, 0, 30 IN, CT1, BLUE

## (undated) DEVICE — PADDING, UNDERCAST, WEBRIL, 6 IN X 4 YD, REG, NS

## (undated) DEVICE — DRESSING, ABDOMINAL, TENDERSORB, 8 X 10 IN, STERILE

## (undated) DEVICE — CATHETER, BALLOON, INPACT AV, DCB 018 5.0 MM X 120 MM X 130 CM

## (undated) DEVICE — BANDAGE, ELASTIC, ACE, SELF-CLOSURE, 6 IN X 5 YD, STERILE

## (undated) DEVICE — DRESSING, GAUZE, PETROLATUM, STRIP OVERWRAP, XEROFORM, 5 X 9 IN, STERILE

## (undated) DEVICE — HEMOSTAT, ARISTA, ABSORBABLE, 3 GRAMS

## (undated) DEVICE — CATHETER, ANGIO, IMPULSE, IM, 5 FR X 100 CM

## (undated) DEVICE — SUTURE, PROLENE, 3-0, 30 IN, SH

## (undated) DEVICE — SUTURE, GUT, CHROMIC, 3-0, 54 IN, LIGAPAK, BROWN

## (undated) DEVICE — CATHETER, NAVICROSS, 0.035 X 150CM, ANGLED TIP

## (undated) DEVICE — TIP, SUCTION, YANKAUER, BULB, ADULT

## (undated) DEVICE — CATHETER, INPACT ADMIRAL, PACLITAXEL COATED , 6FR, 6MM X 250MM X 130CM

## (undated) DEVICE — SHEATH, GLIDESHEATH, R2P DEST SLENDER, 6FR 119CM, STRAIGHT

## (undated) DEVICE — SUTURE, PROLENE, 2-0, CT-1, BL MONO, LF

## (undated) DEVICE — CATHETER, BALLOON DILATION, EUPHORA SEMICOMPLIANT 2.0  X 20 MM X 142CM

## (undated) DEVICE — IRRIGATION SET, CYSTOSCOPY, REGULATING CLAMP, STRAIGHT, 81 IN

## (undated) DEVICE — DRESSING, ADAPTIC, NON-ADHERENT, 3 X 8 IN

## (undated) DEVICE — KIT, MINOR, DOUBLE BASIN

## (undated) DEVICE — HANDPIECE, POOLE SUCTION, W/O TUBING

## (undated) DEVICE — SHEATH, PINNACLE, 10 CM,  5FR INTRODUCER, 5FR DIA, 2.5 CM DIALATOR

## (undated) DEVICE — DRESSING, GAUZE, PETROLATUM, XEROFORM, 5 X 9 IN, STERILE

## (undated) DEVICE — TUBESET, SONICVAC

## (undated) DEVICE — SUTURE, ETHILON, 3-0, 18 IN, PS1, BLACK

## (undated) DEVICE — ACCESS KIT, S-MAK MINI, 4FR 10CM 0.018IN 40CM, NT/PT, ECHO ENHANCE NEEDLE

## (undated) DEVICE — SUTURE, PROLENE, 3-0, 36 IN, RB1, DA, BLUE

## (undated) DEVICE — INTRODUCER/SHEATH, CHECK-FLO FLEXOR, 6FR X 55CM

## (undated) DEVICE — GUIDEWIRE, HI-TORQUE, VERSACORE, 260CM, FLOPPY

## (undated) DEVICE — CATHETER, ANGIO, IMPULSE, MPA1SH, 5 FR X 100 CM

## (undated) DEVICE — SOLUTION, IRRIGATION, SODIUM CHLORIDE 0.9%, 1000 ML, POUR BOTTLE

## (undated) DEVICE — GUIDEWIRE, COMMAND ST, HI-TORQUE, 0.18 X 300CM

## (undated) DEVICE — BLADE, OSCILLATING/SAGITTAL, 25MM X 9MM

## (undated) DEVICE — CONTAINER, SPECIMEN, 120 ML, STERILE

## (undated) DEVICE — DEVICE, INFLATION, ENCORE 20

## (undated) DEVICE — PAD, GROUNDING, ELECTROSURGICAL, W/9 FT CABLE, POLYHESIVE II, ADULT, LF

## (undated) DEVICE — BANDAGE, ELASTIC, MATRIX, SELF-CLOSURE, 4 IN X 5 YD, LF

## (undated) DEVICE — GUIDEWIRE, HI-TORQUE, COMMAND ES, 0.014 X 300CM

## (undated) DEVICE — SUTURE, PROLENE, 0, 30 IN, CT-2, BLUE

## (undated) DEVICE — GLOVE, SURGICAL, PROTEXIS PI BLUE W/NEUTHERA, 7.0, PF, LF

## (undated) DEVICE — CAUTERY, PENCIL, PUSH BUTTON, SMOKE EVAC, 70MM

## (undated) DEVICE — VALVE, CONTROL BLEEDBACK

## (undated) DEVICE — SUTURE, SILK, 3-0, 30 IN, BR SH, BLACK

## (undated) DEVICE — THERAPY UNIT, PREVENA PLUS 125

## (undated) DEVICE — SHEATH, BRITE TIP 6 X 35CM

## (undated) DEVICE — SHEATH, GLIDESHEATH, SLENDER, 6FR 10CM

## (undated) DEVICE — HEMOSTAT, SURGICEL POWDER 3.0GRAMS

## (undated) DEVICE — SPONGE, LAP, XRAY DECT, 18IN X 18IN, W/MASTER DMT, STERILE

## (undated) DEVICE — GLOVE, SURGICAL, PROTEXIS PI MICRO, 7.0, PF, LF